# Patient Record
Sex: FEMALE | Race: WHITE | HISPANIC OR LATINO | Employment: FULL TIME | URBAN - METROPOLITAN AREA
[De-identification: names, ages, dates, MRNs, and addresses within clinical notes are randomized per-mention and may not be internally consistent; named-entity substitution may affect disease eponyms.]

---

## 2017-01-18 ENCOUNTER — OFFICE VISIT (OUTPATIENT)
Dept: FAMILY MEDICINE | Facility: HOSPITAL | Age: 42
End: 2017-01-18
Attending: FAMILY MEDICINE
Payer: MEDICAID

## 2017-01-18 VITALS
DIASTOLIC BLOOD PRESSURE: 89 MMHG | BODY MASS INDEX: 36.53 KG/M2 | HEART RATE: 103 BPM | SYSTOLIC BLOOD PRESSURE: 132 MMHG | WEIGHT: 222.88 LBS | RESPIRATION RATE: 20 BRPM

## 2017-01-18 DIAGNOSIS — M25.532 LEFT WRIST PAIN: Primary | ICD-10-CM

## 2017-01-18 DIAGNOSIS — J30.2 SEASONAL ALLERGIC RHINITIS, UNSPECIFIED ALLERGIC RHINITIS TRIGGER: ICD-10-CM

## 2017-01-18 PROCEDURE — 99213 OFFICE O/P EST LOW 20 MIN: CPT | Performed by: FAMILY MEDICINE

## 2017-01-18 RX ORDER — TRIAMCINOLONE ACETONIDE 55 UG/1
2 SPRAY, METERED NASAL DAILY
Qty: 16.5 G | Refills: 0 | Status: SHIPPED | OUTPATIENT
Start: 2017-01-18 | End: 2017-03-06 | Stop reason: SDUPTHER

## 2017-01-18 NOTE — PROGRESS NOTES
"Subjective:       Patient ID: Caridad Hyatt is a 41 y.o. female     Chief Complaint: Wrist Pain and Cough    HPI   with a history seasonal allergic rhinitis presenting for repeat BP check and L wrist pain. Pt states that she fell down stairs 1 year ago and caught self with wrist. Complains of 10/10 shooting R wrist pain daily with specific movements, 0/10 at rest. Pain radiates up arm, no relieving factors. States that she carries 18-month-old with left arm and wrist.    Also complains of rhinorrhea, headache, conjunctival injection, sinus pressure, and "itching in ears". Denies SOB, chest pain, cough, fever, chills. Minimal relief with Flonase, Claritin, and Nasonex.    Review of Systems   Constitutional: Negative for diaphoresis and fatigue.   HENT: Positive for congestion, rhinorrhea and sinus pressure. Negative for ear discharge and sneezing.    Eyes: Positive for redness. Negative for discharge and visual disturbance.   Cardiovascular: Negative for palpitations and leg swelling.   Gastrointestinal: Negative for abdominal distention, abdominal pain, diarrhea, nausea and vomiting.   Genitourinary: Negative for dysuria.   Musculoskeletal: Positive for arthralgias.        Left wrist pain   Skin: Negative for color change.   Neurological: Negative for weakness.   Psychiatric/Behavioral: Negative for agitation.         Objective:      Vitals:    01/18/17 1620   BP: 132/89   Pulse: 103   Resp: 20     Physical Exam   Constitutional: She is oriented to person, place, and time. She appears well-developed and well-nourished.   Erythematous nasal turbinates  Injected sclera   HENT:   Head: Normocephalic and atraumatic.   Eyes: EOM are normal. Pupils are equal, round, and reactive to light.   Neck: Normal range of motion.   Cardiovascular: Normal rate, regular rhythm and normal heart sounds.    Pulmonary/Chest: Effort normal and breath sounds normal. No respiratory distress. She has no wheezes. She has no rales. "   Abdominal: Soft.   Musculoskeletal: Normal range of motion. She exhibits deformity. She exhibits no tenderness.   L wrist radial swelling   Neurological: She is alert and oriented to person, place, and time.   Psychiatric: She has a normal mood and affect. Her behavior is normal. Thought content normal.       Assessment:       1. Left wrist pain    2. Seasonal allergic rhinitis, unspecified allergic rhinitis trigger        Plan:       Left wrist pain  - Wrist brace, OTC NSAIDs, ice/heating pad  -Xray of left wrist     Seasonal allergic rhinitis, unspecified allergic rhinitis trigger  -Nasacort, Zyrtec    Patient discussed with staff-Dr. Orion Auguste MD  [facility],  2  1/18/2017  5:36 PM

## 2017-01-19 ENCOUNTER — HOSPITAL ENCOUNTER (OUTPATIENT)
Dept: RADIOLOGY | Facility: HOSPITAL | Age: 42
Discharge: HOME | End: 2017-01-19
Attending: FAMILY MEDICINE
Payer: MEDICAID

## 2017-01-19 DIAGNOSIS — M25.532 LEFT WRIST PAIN: ICD-10-CM

## 2017-01-19 PROCEDURE — 73100 X-RAY EXAM OF WRIST: CPT | Mod: TC,LT

## 2017-01-19 PROCEDURE — 73100 X-RAY EXAM OF WRIST: CPT | Mod: 26,LT,, | Performed by: RADIOLOGY

## 2017-01-19 NOTE — PROGRESS NOTES
I assume primary medical responsibility for this patient, I have reviewed the case history, findings, diagnosis and treatment plan with the resident and agree that the care is reasonable and necessary. This service has been performed by a resident without the presence of a teaching physician under the primary care exception  Serene Sears  1/19/2017

## 2017-01-31 ENCOUNTER — TELEPHONE (OUTPATIENT)
Dept: FAMILY MEDICINE | Facility: HOSPITAL | Age: 42
End: 2017-01-31

## 2017-01-31 NOTE — TELEPHONE ENCOUNTER
----- Message from Sharon Yang sent at 1/20/2017  4:10 PM CST -----  Pt she requesting her x rays result  please call pt asap

## 2017-02-15 DIAGNOSIS — K21.9 GASTROESOPHAGEAL REFLUX DISEASE WITHOUT ESOPHAGITIS: ICD-10-CM

## 2017-02-15 RX ORDER — FAMOTIDINE 20 MG/1
TABLET, FILM COATED ORAL
Qty: 60 TABLET | Refills: 0 | Status: SHIPPED | OUTPATIENT
Start: 2017-02-15 | End: 2018-02-28

## 2017-03-06 DIAGNOSIS — J30.2 SEASONAL ALLERGIC RHINITIS, UNSPECIFIED ALLERGIC RHINITIS TRIGGER: ICD-10-CM

## 2017-03-06 RX ORDER — TRIAMCINOLONE ACETONIDE 55 UG/1
SPRAY, METERED NASAL
Qty: 16.9 G | Refills: 0 | Status: SHIPPED | OUTPATIENT
Start: 2017-03-06 | End: 2017-11-02 | Stop reason: SDUPTHER

## 2017-04-19 ENCOUNTER — TELEPHONE (OUTPATIENT)
Dept: OBSTETRICS AND GYNECOLOGY | Facility: CLINIC | Age: 42
End: 2017-04-19

## 2017-04-19 NOTE — TELEPHONE ENCOUNTER
----- Message from Gary Naik sent at 4/19/2017  1:16 PM CDT -----  Contact: self/[phone]  Patient would like to speak to you in regard to a procedure.  Please advise.

## 2017-04-19 NOTE — TELEPHONE ENCOUNTER
Patient called wanting to consult about ablation , hysterectomy and tubal ligation. Not all together but she wants to know which is best for her. She was advised to schedule and appt to have a consult visit. She is scheduled

## 2017-05-03 ENCOUNTER — TELEPHONE (OUTPATIENT)
Dept: OBSTETRICS AND GYNECOLOGY | Facility: CLINIC | Age: 42
End: 2017-05-03

## 2017-05-03 NOTE — TELEPHONE ENCOUNTER
----- Message from Gary Naik sent at 5/3/2017 10:06 AM CDT -----  Contact: self/[phone]  Patient needs MAMMO orders put in the system.  Please advise.

## 2017-05-09 ENCOUNTER — OFFICE VISIT (OUTPATIENT)
Dept: OBSTETRICS AND GYNECOLOGY | Facility: CLINIC | Age: 42
End: 2017-05-09
Payer: MEDICAID

## 2017-05-09 VITALS
WEIGHT: 223.13 LBS | HEIGHT: 66 IN | SYSTOLIC BLOOD PRESSURE: 110 MMHG | BODY MASS INDEX: 35.86 KG/M2 | DIASTOLIC BLOOD PRESSURE: 80 MMHG

## 2017-05-09 DIAGNOSIS — Z30.09 STERILIZATION CONSULT: Primary | ICD-10-CM

## 2017-05-09 PROCEDURE — 99212 OFFICE O/P EST SF 10 MIN: CPT | Mod: PBBFAC,PO | Performed by: OBSTETRICS & GYNECOLOGY

## 2017-05-09 PROCEDURE — 99999 PR PBB SHADOW E&M-EST. PATIENT-LVL II: CPT | Mod: PBBFAC,,, | Performed by: OBSTETRICS & GYNECOLOGY

## 2017-05-09 PROCEDURE — 99212 OFFICE O/P EST SF 10 MIN: CPT | Mod: S$PBB,,, | Performed by: OBSTETRICS & GYNECOLOGY

## 2017-05-09 RX ORDER — TRETINOIN 0.5 MG/G
CREAM TOPICAL NIGHTLY PRN
Refills: 3 | COMMUNITY
Start: 2017-04-20 | End: 2021-08-24

## 2017-05-09 RX ORDER — MONTELUKAST SODIUM 10 MG/1
10 TABLET ORAL DAILY
COMMUNITY
Start: 2017-05-04 | End: 2020-08-31

## 2017-05-09 RX ORDER — HYDROQUINONE 40 MG/G
CREAM TOPICAL
Refills: 3 | COMMUNITY
Start: 2017-02-22 | End: 2020-08-31

## 2017-05-09 RX ORDER — CLOTRIMAZOLE 1 %
CREAM (GRAM) TOPICAL 2 TIMES DAILY PRN
Refills: 3 | COMMUNITY
Start: 2017-04-20 | End: 2020-08-31

## 2017-05-09 RX ORDER — KETOCONAZOLE 20 MG/ML
SHAMPOO, SUSPENSION TOPICAL
Refills: 3 | COMMUNITY
Start: 2017-04-20 | End: 2018-10-10

## 2017-05-09 RX ORDER — BENZOYL PEROXIDE 5 G/100G
GEL TOPICAL
Refills: 2 | COMMUNITY
Start: 2017-04-21 | End: 2020-08-31

## 2017-05-09 NOTE — PROGRESS NOTES
GYNECOLOGY OFFICE NOTE    Reason for visit: desires sterilization    HPI: Pt is a 41 y.o.  female  who presents for consultation regarding sterilization. The use of hormonal contraception has been fully discussed with the patient. We discussed all options including OCPs, transdermal patches, vaginal ring, Depo Provera injections, Implanon, and IUD. Warnings about anticipated minor side effects such as breakthrough spotting, nausea, breast tenderness, weight changes, acne, headaches, etc were given.  She has been told of the more serious potential side effects such as MI, stroke, and deep vein thrombosis, all of which are very unlikely.  She has been asked to report any signs of such serious problems immediately. We also discussed sterilization with bilateral salpingectomy and vasectomy. Pt interested in bilateral salpingectomy.     Past Medical History:   Diagnosis Date    Abnormal Pap smear of cervix     rechecked and came back normal     Anemia     Endometriosis     Hypertension in pregnancy, preeclampsia, delivered        Past Surgical History:   Procedure Laterality Date    ENDOMETRIAL ABLATION      PELVIC LAPAROSCOPY      [facility] [place]- [place]       Family History   Problem Relation Age of Onset    Hypertension Father        Social History   Substance Use Topics    Smoking status: Former Smoker     Packs/day: 0.25     Types: Cigarettes     Start date: 1993     Quit date: 2015    Smokeless tobacco: Never Used    Alcohol use No       OB History    Para Term  AB SAB TAB Ectopic Multiple Living   4 2 1 1 2     2      # Outcome Date GA Lbr Thee/2nd Weight Sex Delivery Anes PTL Lv   4 Term 08/20/15 38w0d  3.062 kg (6 lb 12 oz) F INDUCTION EPI N Y   3  95 39w0d  2.863 kg (6 lb 5 oz) F INDUCTION None N Y      Complications: S/P gastrochisis repair, follow-up exam   2 AB            1 AB               Obstetric Comments   8-20-15 episiotomy  "performed        Current Outpatient Prescriptions   Medication Sig    benzoyl peroxide 5% 5 % gel CESAR TO THE AFFECTED AREA BID    cetirizine (ZYRTEC) 5 MG chewable tablet Take 1 tablet (5 mg total) by mouth once daily.    clotrimazole (LOTRIMIN) 1 % cream CESAR TO THE AFFECTED AREA  QD TO BID    famotidine (PEPCID) 20 MG tablet TAKE ONE TABLET BY MOUTH TWICE DAILY    hydroquinone 4 % Crea APPLY AA BID    ketoconazole (NIZORAL) 2 % shampoo CESAR UTD QD    ketotifen (ZADITOR) 0.025 % (0.035 %) ophthalmic solution INT 1 GTT OU BID    montelukast (SINGULAIR) 10 mg tablet     prednisoLONE acetate (PRED FORTE) 1 % DrpS SHAKE LQ AND INT 1 GTT IN OU BID    tretinoin (RETIN-A) 0.05 % cream APPLY HS    triamcinolone (NASACORT) 55 mcg nasal inhaler USE 2 SPRAYS IN EACH NOSTRIL DAILY     No current facility-administered medications for this visit.        Allergies: Aspirin     /80  Ht 5' 5.5" (1.664 m)  Wt 101.2 kg (223 lb 1.7 oz)  LMP 04/16/2017  BMI 36.56 kg/m2    ROS:  GENERAL: Denies fever or chills.   SKIN: Denies rash or lesions.   HEAD: Denies head injury or headache.   CHEST: Denies chest pain or shortness of breath.   CARDIOVASCULAR: Denies palpitations or chest pain.   ABDOMEN: No abdominal pain, constipation, diarrhea, nausea, vomiting or rectal bleeding.   URINARY: No dysuria, hematuria, or burning on urination.  REPRODUCTIVE: See HPI.   BREASTS: Denies pain, lumps, or nipple discharge.   NEUROLOGIC: Denies syncope or weakness.     Physical Exam:  GENERAL: alert, appears stated age and cooperative  CHEST: Normal respiratory effort  HEART: S1 and S2 normal, regular rate and rhythm  NECK: normal appearance, no thyromegaly masses or tenderness  SKIN: no acne, striae, hirsutism  Talk only    Diagnosis:  1. Sterilization consult        Plan:   1. Will send message to surgical coordinator for sterilization to discuss cost and coverage. Also given information on vasectomy as well. Pt states she may also be " interested in an endometrial ablation to help decreased her menstrual bleeding. Pt has appointment on 5/16/17 for annual and states she should know by then what she desires to have done. .       Adriane Patel MD  OB/GYN  Pager: [phone]

## 2017-06-26 ENCOUNTER — TELEPHONE (OUTPATIENT)
Dept: OBSTETRICS AND GYNECOLOGY | Facility: CLINIC | Age: 42
End: 2017-06-26

## 2017-06-26 NOTE — TELEPHONE ENCOUNTER
Patient informed on  May 9, 2017 during her appointment Dr Patel discussed the options.  Patient was going to inform Dr Patel which procedure she wanted at the time of her annual exam.  Appointment for annual exam scheduled

## 2017-06-26 NOTE — TELEPHONE ENCOUNTER
----- Message from Gary Naik sent at 6/26/2017 12:28 PM CDT -----  Contact: self/[phone]  Patient called to speak to nurse about scheduling the procedure discussed.    Please advise.

## 2017-07-26 ENCOUNTER — TELEPHONE (OUTPATIENT)
Dept: OBSTETRICS AND GYNECOLOGY | Facility: CLINIC | Age: 42
End: 2017-07-26

## 2017-08-11 ENCOUNTER — TELEPHONE (OUTPATIENT)
Dept: OBSTETRICS AND GYNECOLOGY | Facility: CLINIC | Age: 42
End: 2017-08-11

## 2017-08-11 NOTE — TELEPHONE ENCOUNTER
Contacted patients to remind her Dr. Patel will not be in the office today and to reschedule her appointment. Patient reschedule appointment for 8/29/17 at 10:15. Patient verbalized understanding.

## 2017-09-06 ENCOUNTER — OFFICE VISIT (OUTPATIENT)
Dept: OBSTETRICS AND GYNECOLOGY | Facility: CLINIC | Age: 42
End: 2017-09-06
Payer: MEDICAID

## 2017-09-06 VITALS
DIASTOLIC BLOOD PRESSURE: 82 MMHG | WEIGHT: 198.44 LBS | SYSTOLIC BLOOD PRESSURE: 118 MMHG | BODY MASS INDEX: 32.52 KG/M2

## 2017-09-06 DIAGNOSIS — Z01.419 WELL WOMAN EXAM WITH ROUTINE GYNECOLOGICAL EXAM: Primary | ICD-10-CM

## 2017-09-06 DIAGNOSIS — Z12.39 SCREENING FOR BREAST CANCER: ICD-10-CM

## 2017-09-06 DIAGNOSIS — N92.6 IRREGULAR BLEEDING: ICD-10-CM

## 2017-09-06 DIAGNOSIS — Z30.09 STERILIZATION CONSULT: ICD-10-CM

## 2017-09-06 LAB
B-HCG UR QL: NEGATIVE
CTP QC/QA: YES

## 2017-09-06 PROCEDURE — 81025 URINE PREGNANCY TEST: CPT | Mod: PBBFAC,PO | Performed by: OBSTETRICS & GYNECOLOGY

## 2017-09-06 PROCEDURE — 99396 PREV VISIT EST AGE 40-64: CPT | Mod: 57,S$PBB,, | Performed by: OBSTETRICS & GYNECOLOGY

## 2017-09-06 PROCEDURE — 88305 TISSUE EXAM BY PATHOLOGIST: CPT | Performed by: PATHOLOGY

## 2017-09-06 PROCEDURE — 88305 TISSUE EXAM BY PATHOLOGIST: CPT | Mod: 26,,, | Performed by: PATHOLOGY

## 2017-09-06 PROCEDURE — 99213 OFFICE O/P EST LOW 20 MIN: CPT | Mod: PBBFAC,PO,25 | Performed by: OBSTETRICS & GYNECOLOGY

## 2017-09-06 PROCEDURE — 3008F BODY MASS INDEX DOCD: CPT | Mod: ,,, | Performed by: OBSTETRICS & GYNECOLOGY

## 2017-09-06 PROCEDURE — 99999 PR PBB SHADOW E&M-EST. PATIENT-LVL III: CPT | Mod: PBBFAC,,, | Performed by: OBSTETRICS & GYNECOLOGY

## 2017-09-06 RX ORDER — MEDROXYPROGESTERONE ACETATE 10 MG/1
TABLET ORAL
Qty: 84 TABLET | Refills: 0 | Status: SHIPPED | OUTPATIENT
Start: 2017-09-06 | End: 2017-11-02 | Stop reason: SDUPTHER

## 2017-09-06 NOTE — PROGRESS NOTES
GYNECOLOGY OFFICE NOTE    Reason for visit: annual    HPI: Pt is a 42 y.o.  female  who presents for annual. Reports heavy cycles for first 2-3 days. Recently changing pad every 1hr saturated with blood clots. States last month had two cycles Cycle: menarche- 18, Interval- Q month, Duration- 7 days, Flow- normal, denies dysmenorrhea. She is sexually active.  She uses no method for contraception.  She does not desire STI screening. She denies vaginal discharge.  Last pap: 2016, denies hx of abnormal. Last MMG - negative. Pt interested in having bilateral salpingectomy now after discussing other forms of contraception. Understands that it is permanent. Secondary to issues with bleeding currently will like to proceed with endometrial ablation. We discussed the need for endometrial biopsy prior. Patient was counseled today on the causes of AUB/menorrhagia: fibroids, polyps, adenomyosis, anovulation resulting in endometrial hyperplasia, endometritis, cervicitis; the need for a proper evaluation and for a tissue diagnosis was discussed.  A hysteroscopy with D/C  may be necessary. The need for a bHCG, cervical cultures and TVUS was discussed.   The hormonal treatment options for menorrhagia include: Prometrium,  BCPs + NSAID, IUD-Mirena, the pros, cons, risks, and benefits of each was discussed. Other options include endometrial ablation, uterine artery embolization, and hysterectomy; each was discussed in detail.   During the consultation session all of her question were answered. Patient elects to try endometrial ablation.      Past Medical History:   Diagnosis Date    Abnormal Pap smear of cervix     rechecked and came back normal     Anemia     Endometriosis     Hypertension in pregnancy, preeclampsia, delivered        Past Surgical History:   Procedure Laterality Date    [facility] [place]       Family History   Problem Relation Age of Onset     Hypertension Father        Social History   Substance Use Topics    Smoking status: Former Smoker     Packs/day: 0.25     Types: Cigarettes     Start date: 1993     Quit date: 2015    Smokeless tobacco: Never Used    Alcohol use No       OB History    Para Term  AB Living   4 2 1 1 2 2   SAB TAB Ectopic Multiple Live Births           2      # Outcome Date GA Lbr Thee/2nd Weight Sex Delivery Anes PTL Lv   4 Term 08/20/15 38w0d  3.062 kg (6 lb 12 oz) F INDUCTION EPI N RITO   3  95 39w0d  2.863 kg (6 lb 5 oz) F INDUCTION None N RITO      Complications: S/P gastrochisis repair, follow-up exam   2 AB            1 AB               Obstetric Comments   8-20-15 episiotomy performed        Current Outpatient Prescriptions   Medication Sig    benzoyl peroxide 5% 5 % gel CESAR TO THE AFFECTED AREA BID    cetirizine (ZYRTEC) 5 MG chewable tablet Take 1 tablet (5 mg total) by mouth once daily.    clotrimazole (LOTRIMIN) 1 % cream CESAR TO THE AFFECTED AREA  QD TO BID    famotidine (PEPCID) 20 MG tablet TAKE ONE TABLET BY MOUTH TWICE DAILY    hydroquinone 4 % Crea APPLY AA BID    ketoconazole (NIZORAL) 2 % shampoo CESAR UTD QD    ketotifen (ZADITOR) 0.025 % (0.035 %) ophthalmic solution INT 1 GTT OU BID    montelukast (SINGULAIR) 10 mg tablet     prednisoLONE acetate (PRED FORTE) 1 % DrpS SHAKE LQ AND INT 1 GTT IN OU BID    tretinoin (RETIN-A) 0.05 % cream APPLY HS    triamcinolone (NASACORT) 55 mcg nasal inhaler USE 2 SPRAYS IN EACH NOSTRIL DAILY    medroxyPROGESTERone (PROVERA) 10 MG tablet 2 tablets TID x 7 days then 2 tablets daily x 21 days     No current facility-administered medications for this visit.        Allergies: Aspirin     /82   Wt 90 kg (198 lb 6.6 oz)   LMP 2017 (Exact Date)   BMI 32.52 kg/m²     ROS:  GENERAL: Denies fever or chills.   SKIN: Denies rash or lesions.   HEAD: Denies head injury or headache.   CHEST: Denies chest pain or shortness of breath.    CARDIOVASCULAR: Denies palpitations or chest pain.   ABDOMEN: No abdominal pain, constipation, diarrhea, nausea, vomiting or rectal bleeding.   URINARY: No dysuria, hematuria, or burning on urination.  REPRODUCTIVE: See HPI.   BREASTS: Denies pain, lumps, or nipple discharge.   NEUROLOGIC: Denies syncope or weakness.     Physical Exam:  GENERAL: alert, appears stated age and cooperative  CHEST: Normal respiratory effort  HEART: S1 and S2 normal, regular rate and rhythm  NECK: normal appearance, no thyromegaly masses or tenderness  SKIN: no acne, striae, hirsutism  BREAST EXAM: breasts appear normal, no suspicious masses, no skin or nipple changes or axillary nodes  ABDOMEN: abdomen is soft without significant tenderness, masses, organomegaly or guarding, no hernias noted  EXTERNAL GENITALIA:  normal general appearance  URETHRA: normal urethra, normal urethral meatus  VAGINA:  normal mucosa without prolapse or lesions  CERVIX:  Nabothian cysts  UTERUS:  normal size, mobile, non-tender, normal shape and consistency  ADNEXA:  normal adnexa in size, nontender and no masses    DATE: September 6th, 2017    TIME: 10:30 AM    PROCEDURE: Endometrial biopsy    INDICATION: AUB-N    PATIENT CONSENT:     PRE ENDOMETRIAL BIOPSY COUNSELING:The patient was informed of the risk of bleeding, infection, uterine perforation and pain and that the test will rule-out endometrial cancer with accuracy greater than 95%. She was counseled on the alternatives to endometrial biopsy.  All of her questions were answered. Counseling lasted approximately 15 minutes and all her questions were answered.    Patient gives written consent    ANESTHESIA: None    Labs: UPT performed prior to the procedure was negative.    PROCEDURE NOTE:  The cervix was visualized with a speculum and swabbed with Betadinex3.  The anterior lip of the cervix was grasped with a single toothed tenaculum, and a sterile endometrial pipelle was inserted into the uterus to a  sound length of 8 cm. 2 passes were made with the pipelle and scant amount of tissue was obtained. The specimen was placed in formalin and sent to Pathology for evaluation. Tenaculum removed and hemostasis noted    COMPLICATIONS: None    DISPOSITION: The patient tolerated the above procedure well.    POST ENDOMETRIAL BIOPSY COUNSELING:  - Manage post biopsy cramping with NSAIDs or Tylenol.  - Expect spotting or light bleeding for a few days.  - Report bleeding heavier than a period, fever > 101.0 F, worsening pain or a foul smelling vaginal discharge.      Diagnosis:  1. Well woman exam with routine gynecological exam    2. Irregular bleeding    3. Screening for breast cancer    4. Sterilization consult        Plan:   1. Annual. Up todate with pap  2. EMBX today. Pt desires to have endometrial ablation  3. MMG order  4. Medicaid consents signed for bilateral salpingectomy. Message sent to Steph Merrill Placed This Encounter    Mammo Digital Screening Bilat with CAD    POCT urine pregnancy    Tissue Specimen To Pathology, Obstetrics/Gynecology    medroxyPROGESTERone (PROVERA) 10 MG tablet       Patient was counseled today on the new ACS guidelines for cervical cytology screening as well as the current recommendations for breast cancer screening. She was counseled to follow up with her PCP for other routine health maintenance.     Return for preoperative appointment.      Adriane Patel MD  OB/GYN  Pager: [phone]

## 2017-09-08 ENCOUNTER — TELEPHONE (OUTPATIENT)
Dept: OBSTETRICS AND GYNECOLOGY | Facility: CLINIC | Age: 42
End: 2017-09-08

## 2017-09-08 NOTE — TELEPHONE ENCOUNTER
Patient called and stated that she need a prior authorization ,for the provera Rx . Called [employer] and initiated PA. Rep stated that she will fax over decision within 24 hours.   PA#[id number]

## 2017-09-11 ENCOUNTER — TELEPHONE (OUTPATIENT)
Dept: OBSTETRICS AND GYNECOLOGY | Facility: CLINIC | Age: 42
End: 2017-09-11

## 2017-09-11 NOTE — TELEPHONE ENCOUNTER
Returend patients call. Patient did not answer. Calling patient to notify her of medication being approved. Left message to return call.

## 2017-09-11 NOTE — TELEPHONE ENCOUNTER
----- Message from Travis Zarate sent at 9/11/2017  9:46 AM CDT -----  Contact: [phone]/self  Pt states she's returning your call.  Please call and advise

## 2017-09-11 NOTE — TELEPHONE ENCOUNTER
----- Message from Ayde Sanchez sent at 9/11/2017 10:28 AM CDT -----  Contact: [phone]/self  Patient called in returning your call. Please advise.

## 2017-09-19 ENCOUNTER — TELEPHONE (OUTPATIENT)
Dept: OBSTETRICS AND GYNECOLOGY | Facility: CLINIC | Age: 42
End: 2017-09-19

## 2017-09-19 NOTE — TELEPHONE ENCOUNTER
----- Message from Rose Hernández sent at 9/19/2017 11:35 AM CDT -----  Contact: [phone]  Patient is returning your call

## 2017-09-19 NOTE — TELEPHONE ENCOUNTER
Returned patients call. Patient wants to know what her next steps are to get her tubal scheduled. Patient would like to schedule her tubal. Patient consents signed on 9/6/17. Patient informed she will receive a call to schedule her surgery. Patient verbalized understanding.

## 2017-09-19 NOTE — TELEPHONE ENCOUNTER
----- Message from Rose Hernández sent at 9/19/2017 11:35 AM CDT -----  Contact: [phone]  Patient is returning your call

## 2017-09-22 ENCOUNTER — TELEPHONE (OUTPATIENT)
Dept: OBSTETRICS AND GYNECOLOGY | Facility: CLINIC | Age: 42
End: 2017-09-22

## 2017-09-22 NOTE — TELEPHONE ENCOUNTER
Returned patients phone call. Informed patient that it was not documented that someone called. She informed me she is waiting on a call to schedule her procedure for a tubal so maybe that could have been what it was for. Informed patient I would route to my supervisor to see if maybe that is why she received a call. Patient verbalized understanding.

## 2017-09-22 NOTE — TELEPHONE ENCOUNTER
----- Message from Edna Saucedo sent at 9/22/2017 10:57 AM CDT -----  Contact: self, [phone]  Patient called in returning your call. Please advise.

## 2017-10-11 ENCOUNTER — TELEPHONE (OUTPATIENT)
Dept: OBSTETRICS AND GYNECOLOGY | Facility: CLINIC | Age: 42
End: 2017-10-11

## 2017-10-11 NOTE — TELEPHONE ENCOUNTER
----- Message from Zander Muñoz sent at 10/11/2017  8:30 AM CDT -----  Contact: self/[phone]  She is returning the nurse's call.

## 2017-10-11 NOTE — TELEPHONE ENCOUNTER
----- Message from Zander Muñoz sent at 10/11/2017  8:30 AM CDT -----  Contact: self/[phone]  She is returning the nurse's call.

## 2017-10-12 ENCOUNTER — HOSPITAL ENCOUNTER (OUTPATIENT)
Dept: RADIOLOGY | Facility: HOSPITAL | Age: 42
Discharge: HOME | End: 2017-10-12
Attending: OBSTETRICS & GYNECOLOGY
Payer: MEDICAID

## 2017-10-12 ENCOUNTER — TELEPHONE (OUTPATIENT)
Dept: OBSTETRICS AND GYNECOLOGY | Facility: CLINIC | Age: 42
End: 2017-10-12

## 2017-10-12 VITALS — BODY MASS INDEX: 31.82 KG/M2 | WEIGHT: 198 LBS | HEIGHT: 66 IN

## 2017-10-12 DIAGNOSIS — N80.9 ENDOMETRIOSIS: Primary | ICD-10-CM

## 2017-10-12 DIAGNOSIS — Z12.39 SCREENING FOR BREAST CANCER: ICD-10-CM

## 2017-10-12 DIAGNOSIS — N92.1 MENORRHAGIA WITH IRREGULAR CYCLE: ICD-10-CM

## 2017-10-12 PROCEDURE — 77067 SCR MAMMO BI INCL CAD: CPT | Mod: 26,,, | Performed by: RADIOLOGY

## 2017-10-12 PROCEDURE — 77067 SCR MAMMO BI INCL CAD: CPT | Mod: TC

## 2017-10-12 NOTE — TELEPHONE ENCOUNTER
Returned patients call. Patient states she got a call to have her procedure done next week to have a tubal and to burn the lining of her uterus. Patient states she does not get very good service in her house so she is worried she missed a call. Please advise if patient has missed a call to schedule the surgery. She would like it the week of the 23rd if possible.

## 2017-10-12 NOTE — TELEPHONE ENCOUNTER
----- Message from Nae Bartholomew sent at 10/12/2017 10:05 AM CDT -----  Contact: Self/[phone]  Patient would like to speak with you about getting information about a procedure. Please advise.

## 2017-10-30 ENCOUNTER — TELEPHONE (OUTPATIENT)
Dept: OBSTETRICS AND GYNECOLOGY | Facility: CLINIC | Age: 42
End: 2017-10-30

## 2017-10-31 ENCOUNTER — HOSPITAL ENCOUNTER (OUTPATIENT)
Dept: PREADMISSION TESTING | Facility: HOSPITAL | Age: 42
Discharge: HOME | End: 2017-10-31
Attending: INTERNAL MEDICINE
Payer: MEDICAID

## 2017-10-31 ENCOUNTER — OFFICE VISIT (OUTPATIENT)
Dept: OBSTETRICS AND GYNECOLOGY | Facility: CLINIC | Age: 42
End: 2017-10-31
Payer: MEDICAID

## 2017-10-31 ENCOUNTER — ANESTHESIA EVENT (OUTPATIENT)
Dept: SURGERY | Facility: HOSPITAL | Age: 42
End: 2017-10-31
Payer: MEDICAID

## 2017-10-31 VITALS
BODY MASS INDEX: 33.09 KG/M2 | SYSTOLIC BLOOD PRESSURE: 130 MMHG | DIASTOLIC BLOOD PRESSURE: 78 MMHG | WEIGHT: 201.94 LBS

## 2017-10-31 VITALS
SYSTOLIC BLOOD PRESSURE: 139 MMHG | OXYGEN SATURATION: 95 % | TEMPERATURE: 98 F | DIASTOLIC BLOOD PRESSURE: 93 MMHG | HEART RATE: 91 BPM | BODY MASS INDEX: 33.65 KG/M2 | WEIGHT: 201.94 LBS | RESPIRATION RATE: 17 BRPM | HEIGHT: 65 IN

## 2017-10-31 DIAGNOSIS — Z01.818 PRE-OP TESTING: ICD-10-CM

## 2017-10-31 DIAGNOSIS — Z30.2 ENCOUNTER FOR STERILIZATION: ICD-10-CM

## 2017-10-31 DIAGNOSIS — N93.9 ABNORMAL UTERINE BLEEDING (AUB): Primary | ICD-10-CM

## 2017-10-31 PROCEDURE — 99213 OFFICE O/P EST LOW 20 MIN: CPT | Mod: S$PBB,,, | Performed by: OBSTETRICS & GYNECOLOGY

## 2017-10-31 PROCEDURE — 99213 OFFICE O/P EST LOW 20 MIN: CPT | Mod: PBBFAC,PO | Performed by: OBSTETRICS & GYNECOLOGY

## 2017-10-31 PROCEDURE — 99999 PR PBB SHADOW E&M-EST. PATIENT-LVL III: CPT | Mod: PBBFAC,,, | Performed by: OBSTETRICS & GYNECOLOGY

## 2017-10-31 RX ORDER — IBUPROFEN 600 MG/1
600 TABLET ORAL EVERY 6 HOURS PRN
Qty: 30 TABLET | Refills: 0 | Status: SHIPPED | OUTPATIENT
Start: 2017-10-31 | End: 2017-10-31 | Stop reason: CLARIF

## 2017-10-31 RX ORDER — SODIUM CHLORIDE, SODIUM LACTATE, POTASSIUM CHLORIDE, CALCIUM CHLORIDE 600; 310; 30; 20 MG/100ML; MG/100ML; MG/100ML; MG/100ML
INJECTION, SOLUTION INTRAVENOUS CONTINUOUS
Status: CANCELLED | OUTPATIENT
Start: 2017-10-31

## 2017-10-31 RX ORDER — SCOLOPAMINE TRANSDERMAL SYSTEM 1 MG/1
1 PATCH, EXTENDED RELEASE TRANSDERMAL
Status: CANCELLED | OUTPATIENT
Start: 2017-10-31 | End: 2017-11-01

## 2017-10-31 RX ORDER — LIDOCAINE HYDROCHLORIDE 10 MG/ML
1 INJECTION, SOLUTION EPIDURAL; INFILTRATION; INTRACAUDAL; PERINEURAL ONCE
Status: CANCELLED | OUTPATIENT
Start: 2017-10-31 | End: 2017-10-31

## 2017-10-31 RX ORDER — OXYCODONE AND ACETAMINOPHEN 5; 325 MG/1; MG/1
1 TABLET ORAL EVERY 6 HOURS PRN
Qty: 20 TABLET | Refills: 0 | Status: SHIPPED | OUTPATIENT
Start: 2017-10-31 | End: 2018-02-26

## 2017-10-31 NOTE — DISCHARGE INSTRUCTIONS
Your surgery is scheduled for 11/03/17.    Please report to Outpatient Surgery Intake Office on the 2nd FLOOR at 0530 a.m.          INSTRUCTIONS IMPORTANT!!!  ¨ Do not eat or drink after 11pm -including water. OK to brush teeth, no   gum, candy or mints!    ¨ Take only these medicines with a small swallow of water-morning of surgery.        ____  Proceed to [facility] on 10/31/2017 for additional blood test.        ____  Do not wear makeup, including mascara.  ____  No powder, lotions or creams to surgical area.  ____  Please remove all jewelry, including piercings and leave at home.  ____  No money or valuables needed. Please leave at home.  ____  Please bring any documents given by your doctor.  ____  If going home the same day, arrange for a ride home. You will not be able to             drive if Anesthesia was used.  ____  Children under 18 years require a parent / guardian present the entire time             they are in surgery / recovery.  ____  Wear loose fitting clothing. Allow for dressings, bandages.  ____  Stop Aspirin, Ibuprofen, Motrin and Aleve at least 3-5 days before surgery, unless otherwise instructed by your doctor, or the nurse.   You MAY use Tylenol/acetaminophen until day of surgery.  ____  Wash the surgical area with Hibiclens the night before surgery, and again the             morning of surgery.  Be sure to rinse hibiclens off completely (if instructed by   nurse).  ____  If you take diabetic medication, do not take am of surgery unless instructed by Doctor.  ____  Call MD for temperature above 101 degrees.  ____ Stop taking any Fish Oil supplement or any Vitamins that contain Vitamin E at least 5 days prior to surgery.  ____ Do Not wear your contact lenses the day of your procedure.  You may wear your glasses.        I have read or had read and explained to me, and understand the above information.  Additional comments or instructions:  For additional questions call  [phone]     Take a Hibiclens shower twice a day for 3 days prior to surgery, including the morning of surgery.   Gargle with Listerine twice a day for 3 days prior to surgery, including the morning of surgery.      Pre-Op Bathing Instructions    Before surgery, you can play an important role in your own health.    Because skin is not sterile, we need to be sure that your skin is as free of germs as possible. By following the instructions below, you can reduce the number of germs on your skin before surgery.    IMPORTANT: You will need to shower with a special soap called Hibiclens*, available at any pharmacy.  If you are allergic to Chlorhexidine (the antiseptic in Hibiclens), use an antibacterial soap such as Dial Soap for your preoperative shower.  You will shower with Hibiclens both the night before your surgery and the morning of your surgery.  Do not use Hibiclens on the head, face or genitals to avoid injury to those areas.    STEP #1: THE NIGHT BEFORE YOUR SURGERY     1. Do not shave the area of your body where your surgery will be performed.  2. Shower and wash your hair and body as usual with your normal soap and shampoo.  3. Rinse your hair and body thoroughly after you shower to remove all soap residue.  4. With your hand, apply one packet of Hibiclens soap to the surgical site.   5. Wash the site gently for five (5) minutes. Do not scrub your skin too hard.   6. Do not wash with your regular soap after Hibiclens is used.  7. Rinse your body thoroughly.  8. Pat yourself dry with a clean, soft towel.  9. Do not use lotion, cream, or powder.  10. Wear clean clothes.    STEP #2: THE MORNING OF YOUR SURGERY     1. Repeat Step #1.    * Not to be used by people allergic to Chlorhexidine.    Discharge Instruction for Laparoscopic Fallopian Tube Ligation  Your doctor did a sterilization procedure to prevent any future pregnancies. This is a permanent form of birth control. Several different methods of surgical  sterilization can be used to block the fallopian tubes. They all stop the egg from entering the womb (uterus) and sperm from traveling up to fertilize the egg. You had a laparoscopic procedure. The incisions on your abdomen may be tender or sore. You may also have pain in your upper back or shoulders. This is from the gas used to enlarge the abdomen to allow your doctor to see inside your pelvis and do the procedure. This pain usually goes away in a day or two.  Here's what you can do to speed your recovery after surgery.   Home care  · Take it easy. Stay quiet and rest for 2 days.  · Return to your normal activities after 48 hours. You may also return to work at that time.  · Eat a normal diet.  · If needed, take an over-the-counter pain reliever for pain.  · Don't lift anything heavier than 10 pounds for 1 week after the procedure.  · Don't drive for at least 24 hours after the procedure and until pain is minimal without taking opioids if prescribed  · Don't have sex for 2 weeks after surgery.  Follow-up care  Make a follow-up appointment as directed by our staff.     When to call your healthcare provider  Call your healthcare provider right away if you have any of the following:  · Fever above 100.4°F (38°C) or higher, or as directed by your healthcare provider  · Chills  · Dizziness or fainting  · Abdominal pain and swelling that get worse  · Nausea and vomiting  · Signs of infection. These include drainage, pus, warmth, or redness at your incision site.  · Sudden chest pain or shortness of breath   Date Last Reviewed: 5/19/2015  © 7920-0074 [employer]. [address] [place] [address], [place] [address]. All rights reserved. This information is not intended as a substitute for professional medical care. Always follow your healthcare professional's instructions.    Endometrial Ablation  Endometrial ablation is an outpatient surgery that can reduce or stop heavy menstrual bleeding. Ablation destroys the  lining of the uterus. This surgery is for women who do not want to have any more children and who have not yet entered menopause. It should not be used by women with endometrial hyperplasia or cancer of the uterus.  Treatment takes less than an hour, and you can go home later that day.  Preparing for surgery  · You may be given medicine by mouth or injection for a few weeks or months before your ablation. This thins the lining of the uterus and reduces bleeding.  · Your health care provider may recommend other procedures to check the inside of your uterus before the ablation is done.   · The day before surgery, you may be given medicine or a special substance (laminaria) may be put into your cervix (the opening to the uterus). This widens the opening.  · To help prevent problems with anesthesia, do not eat or drink anything 10 hours before surgery.  Your surgery     Destroying the lining with heat, freezing, or electric current prevents the lining from growing back.      · Youll be given anesthesia so you stay comfortable and relaxed and feel no pain during surgery.  · Then, your uterus may be filled with fluid. This puts pressure on the lining to help reduce bleeding. It also allows your health care provider to see inside your uterus.  · Next your health care provider puts a small telescope-like instrument through the cervix. This scope may be connected to a video monitor. This helps your health care provider see and control the ablation process. At the end of the scope, a device using heat, freezing, or electric current destroys the uterine lining. Instead of the scope, your health care provider may use a device that both expands and destroys the uterine lining. After being inserted into your uterus, it also uses heat or other energy to destroy the lining. Your health care provider will choose the device thats best for you.  Your recovery  · You may have cramping or aching in your abdomen after surgery. Your  health care provider can give you pain medicine.  · You may also have a bloody or watery discharge or bleeding for days or weeks. Use sanitary pads, not tampons.  · Dont have sexual intercourse or play active sports for 2 weeks after surgery.  · You can likely return to work in 2 days.  · Ask your health care provider about using contraception after an ablation.  · Your health care provider will see you in about 6 weeks to be sure youre healing well.  Call your health care provider if you have any of the following after surgery:  · Persistent or increased abdominal pain  · Shortness of breath  · Heavy vaginal bleeding  · Fever over 100.4°F (38°C) or chills  · Nausea  · Frequent urination for 24 hours   Date Last Reviewed: 5/10/2015  © 6732-8577 [employer]. [address] [place] [address]. All rights reserved. This information is not intended as a substitute for professional medical care. Always follow your healthcare professional's instructions.    Anesthesia: General Anesthesia     You are watched continuously during your procedure by your anesthesia provider.     Youre due to have surgery. During surgery, youll be given medicine called anesthesia or anesthetic. This will keep you comfortable and pain-free. Your anesthesia provider will use general anesthesia.  What is general anesthesia?  General anesthesia puts you into a state like deep sleep. It goes into the bloodstream (IV anesthetics), into the lungs (gas anesthetics), or both. You feel nothing during the procedure. You will not remember it. During the procedure, the anesthesia provider monitors you continuously. He or she checks your heart rate and rhythm, blood pressure, breathing, and blood oxygen.  · IV anesthetics. IV anesthetics are given through an IV line in your arm. Theyre often given first. This is so you are asleep before a gas anesthetic is started. Some kinds of IV anesthetics relieve pain. Others relax you. Your  doctor will decide which kind is best in your case.  · Gas anesthetics. Gas anesthetics are breathed into the lungs. They are often used to keep you asleep. They can be given through a facemask or a tube placed in your larynx or trachea (breathing tube).  ¨ If you have a facemask, your anesthesia provider will most likely place it over your nose and mouth while youre still awake. Youll breathe oxygen through the mask as your IV anesthetic is started. Gas anesthetic may be added through the mask.  ¨ If you have a tube in the larynx or trachea, it will be inserted into your throat after youre asleep.  Anesthesia tools and medicines  You will likely have:  · IV anesthetics. These are put into an IV line into your bloodstream.  · Gas anesthetics. You breathe these anesthetics into your lungs, where they pass into your bloodstream.  · Pulse oximeter. This is a small clip that is attached to the end of your finger. This measures your blood oxygen level.  · Electrocardiography leads (electrodes). These are small sticky pads that are placed on your chest. They record your heart rate and rhythm.  · Blood pressure cuff. This reads your blood pressure.  Risks and possible complications  General anesthesia has some risks. These include:  · Breathing problems  · Nausea and vomiting  · Sore throat or hoarseness (usually temporary)  · Allergic reaction to the anesthetic  · Irregular heartbeat (rare)  · Cardiac arrest (rare)   Anesthesia safety  · Follow all instructions you are given for how long not to eat or drink before your procedure.  · Be sure your doctor knows what medicines and drugs you take. This includes over-the-counter medicines, herbs, supplements, alcohol or other drugs. You will be asked when those were last taken.  · Have an adult family member or friend drive you home after the procedure.  · For the first 24 hours after your surgery:  ¨ Do not drive or use heavy equipment.  ¨ Do not make important decisions or  sign legal documents. If important decisions or signing legal documents is necessary during the first 24 hours after surgery, have a trusted family member or spouse act on your behalf.  ¨ Avoid alcohol.  ¨ Have a responsible adult stay with you. He or she can watch for problems and help keep you safe.  Date Last Reviewed: 12/1/2016  © 2708-6364 [employer]. [address] [place] [address], [place] [address]. All rights reserved. This information is not intended as a substitute for professional medical care. Always follow your healthcare professional's instructions.

## 2017-10-31 NOTE — PLAN OF CARE
Pre op done, Patient unable to go to get labs done today will come and get them done tomorrow.  She has to  her kids

## 2017-10-31 NOTE — PROGRESS NOTES
[facility] Pre-op Exam      HPI : Caridad De La Garza is a 42 y.o. female  for preop appointment for BTL/ablation secondary to desires sterilization and AUB-N. Surgery scheduled for 11/3/17. Patient has tried OCP/depo lupron for endometrosis, AUB:  Patient was counseled today on the causes of AUB/menorrhagia: fibroids, polyps, adenomyosis, anovulation resulting in endometrial hyperplasia, endometritis, cervicitis; the need for a proper evaluation and for a tissue diagnosis was discussed.  A hysteroscopy with D/C  may be necessary. The need for a bHCG, cervical cultures and TVUS was discussed.   The hormonal treatment options for menorrhagia include: Prometrium,  BCPs + NSAID, IUD-Mirena, the pros, cons, risks, and benefits of each was discussed. Other options include endometrial ablation, uterine artery embolization, and hysterectomy; each was discussed in detail.   During the consultation session all of her question were answered. Patient elects to try ablation. Laparoscopic COUNSELING: The pros, cons, risks, benefits and alternatives all laparoscopic surgery were discussed.  The potential for injury to bowel, bladder, blood vessel and ureter was discussed.  The possible need for a blood transfusion discussed.  The probable need to remove the ovary was discussed.  The potential of recurrent disease or remnant ovary and need for more surgery was discussed.  The possible need to do a repair of the ureter and/or bowel and the need to open the abdomen was discussed.  The patient was informed that if the abdomen needed to be opened, that the incision might be a midline not a Pfannenstiel incision.  After the pros, cons risks and benefits were discussed the patient decided to have the surgery and she was consented for the procedures in usual fashion. All questions were answered.  Surgery scheduled for    Pelvic Ultrasound:   The uterus is retroverted and measures 8.3 centimeters in length, and 4.0 by 6.7 centimeters in  transaxial dimension. No shadowing object is seen within the uterus. Incidental note of nabothian cysts are made. Endometrial stripe is hyperechoic measuring 5 mm.    The right ovary measures 3.0 by 1.9 by 2.9 centimeters. It has a normal sonographic appearance with normal flow.     The left ovary measures 4.4 by 1.7 by 2.9 centimeters. It has a normal sonographic appearance with normal flow.    Pathology:   FRAGMENTS OF WEAKLY PROLIFERATIVE ENDOMETRIUM    Past Medical History:   Diagnosis Date    Abnormal Pap smear of cervix     rechecked and came back normal     Anemia     Endometriosis     Hypertension in pregnancy, preeclampsia, delivered      Past Surgical History:   Procedure Laterality Date    PELVIC LAPAROSCOPY      [facility] [place]- endometriosis     Family History   Problem Relation Age of Onset    Hypertension Father      Social History   Substance Use Topics    Smoking status: Former Smoker     Packs/day: 0.25     Types: Cigarettes     Start date: 1993     Quit date: 2015    Smokeless tobacco: Never Used    Alcohol use No     OB History    Para Term  AB Living   7 5 4 1 2 2   SAB TAB Ectopic Multiple Live Births           2      # Outcome Date GA Lbr Thee/2nd Weight Sex Delivery Anes PTL Lv   7 Term 08/20/15 38w0d  3.062 kg (6 lb 12 oz) F INDUCTION EPI N RITO   6  95 39w0d  2.863 kg (6 lb 5 oz) F INDUCTION None N RITO      Complications: S/P gastrochisis repair, follow-up exam   5 Term            4 Term            3 Term            2 AB            1 AB               Obstetric Comments   8-20-15 episiotomy performed        /78   Wt 91.6 kg (201 lb 15.1 oz)   LMP 10/23/2017 (Exact Date)   BMI 33.09 kg/m²     ROS:  GENERAL: Feeling well overall.   SKIN: Denies rash or lesions.   HEAD: Denies head injury or headache.   NODES: Denies enlarged lymph nodes.   CHEST: Denies chest pain or shortness of breath.   CARDIOVASCULAR: Denies palpitations or left  sided chest pain.   ABDOMEN: No abdominal pain, constipation, diarrhea, nausea, vomiting or rectal bleeding.   URINARY: No frequency, dysuria, hematuria, or burning on urination.  REPRODUCTIVE: See HPI.   BREASTS: Denies pain, lumps, or nipple discharge.   HEMATOLOGIC: No easy bruisability.  MUSCULOSKELETAL: Denies joint pain or swelling.   NEUROLOGIC: Denies syncope or weakness.   PSYCHIATRIC: Denies depression, anxiety or mood swings.      PHYSICAL EXAM:  APPEARANCE: Well nourished, well developed, in no acute distress.  AFFECT: WNL, alert and oriented x 3  SKIN: No acne or hirsutism  NECK: Neck symmetric without masses or thyromegaly  NODES: No inguinal, cervical, axillary, or femoral lymph node enlargement  CHEST: Good respiratory effect, CTAB  ABDOMEN: Soft.  No tenderness or masses.  No hepatosplenomegaly.  No hernias.  PELVIC: deferred    EXTREMITIES: No edema.    ASSESSMENT & PLAN:  AUB, sterilization    I have discussed the risks, benefits, indications, and alternatives of the procedure in detail.  The patient verbalizes her understanding.  All questions answered.  Consents signed.  The patient agrees to proceed to proceed as planned: 11/3/17 laparoscopic salpingectomy and endometrial ablation. We discuseed that this would not help with pelvic pain that may be associated with endometriosis      Adriane Patel MD  OB/GYN  Pager: [phone]

## 2017-10-31 NOTE — ANESTHESIA PREPROCEDURE EVALUATION
10/31/2017  Caridad De La Garza is a 42 y.o., female is scheduled for laparoscopic tubal ligation with hysteroscopic D&C with endometrial ablation under [facility] on 11/03/2017.    Past Surgical History:   Procedure Laterality Date    PELVIC LAPAROSCOPY  2003    [facility] [place]- endometri[place]       Anesthesia Evaluation    I have reviewed the Patient Summary Reports.    I have reviewed the Nursing Notes.   I have reviewed the Medications.     Review of Systems  Anesthesia Hx:  Hx of Anesthetic complications  History of prior surgery of interest to airway management or planning: Previous anesthesia: General Personal Hx of Anesthesia complications, Post-Operative Nausea/Vomiting, in the past, but not with recent anesthetics / prophylaxis   Social:  Non-Smoker, No Alcohol Use    Hematology/Oncology:  Hematology Normal        EENT/Dental:   chronic allergic rhinitis   Cardiovascular:   Exercise tolerance: good Denies Hypertension.  Denies Dysrhythmias.   Denies Angina.        Pulmonary:   Denies Shortness of breath.    Renal/:  Renal/ Normal     Hepatic/GI:   GERD, well controlled    OB/GYN/PEDS:  Heavy menstrual bleeding   Neurological:  Neurology Normal    Endocrine:  Endocrine Normal        Physical Exam  General:  Obesity    Airway/Jaw/Neck:  Airway Findings: Mouth Opening: Normal Tongue: Normal  General Airway Assessment: Adult  Mallampati: II  TM Distance: Normal, at least 6 cm        Eyes/Ears/Nose:  EYES/EARS/NOSE FINDINGS: Normal   Dental:  Dental Findings: In tact, Periodontal disease, Mild   Chest/Lungs:  Chest/Lungs Clear    Heart/Vascular:  Heart Findings: Normal Heart murmur: negative    Abdomen:  Abdomen Findings: Normal      Mental Status:  Mental Status Findings: Normal        Anesthesia Plan  Type of Anesthesia, risks & benefits discussed:  Anesthesia Type:  general  Patient's  Preference:   Intra-op Monitoring Plan:   Intra-op Monitoring Plan Comments:   Post Op Pain Control Plan:   Post Op Pain Control Plan Comments:   Induction:   IV  Beta Blocker:  Patient is not currently on a Beta-Blocker (No further documentation required).       Informed Consent: Patient understands risks and agrees with Anesthesia plan.  Questions answered.   ASA Score: 2     Day of Surgery Review of History & Physical:        Anesthesia Plan Notes: Anesthesia consent will be obtained prior to procedure on 11/03/2017.        Ready For Surgery From Anesthesia Perspective.

## 2017-11-01 ENCOUNTER — CLINICAL SUPPORT (OUTPATIENT)
Dept: LAB | Facility: HOSPITAL | Age: 42
End: 2017-11-01
Attending: INTERNAL MEDICINE
Payer: MEDICAID

## 2017-11-01 DIAGNOSIS — Z01.818 PRE-OP TESTING: ICD-10-CM

## 2017-11-01 DIAGNOSIS — N93.9 ABNORMAL UTERINE BLEEDING (AUB): ICD-10-CM

## 2017-11-01 LAB
ABO + RH BLD: NORMAL
ANION GAP SERPL CALC-SCNC: 8 MMOL/L
BASOPHILS # BLD AUTO: 0.03 K/UL
BASOPHILS NFR BLD: 0.5 %
BLD GP AB SCN CELLS X3 SERPL QL: NORMAL
BUN SERPL-MCNC: 12 MG/DL
CALCIUM SERPL-MCNC: 8.9 MG/DL
CHLORIDE SERPL-SCNC: 107 MMOL/L
CO2 SERPL-SCNC: 23 MMOL/L
CREAT SERPL-MCNC: 0.7 MG/DL
DIFFERENTIAL METHOD: ABNORMAL
EOSINOPHIL # BLD AUTO: 0.2 K/UL
EOSINOPHIL NFR BLD: 3.8 %
ERYTHROCYTE [DISTWIDTH] IN BLOOD BY AUTOMATED COUNT: 14.7 %
EST. GFR  (AFRICAN AMERICAN): >60 ML/MIN/1.73 M^2
EST. GFR  (NON AFRICAN AMERICAN): >60 ML/MIN/1.73 M^2
GLUCOSE SERPL-MCNC: 73 MG/DL
HCT VFR BLD AUTO: 35.2 %
HGB BLD-MCNC: 11.3 G/DL
LYMPHOCYTES # BLD AUTO: 1.9 K/UL
LYMPHOCYTES NFR BLD: 30.4 %
MCH RBC QN AUTO: 25.7 PG
MCHC RBC AUTO-ENTMCNC: 32.1 G/DL
MCV RBC AUTO: 80 FL
MONOCYTES # BLD AUTO: 0.5 K/UL
MONOCYTES NFR BLD: 7.1 %
NEUTROPHILS # BLD AUTO: 3.7 K/UL
NEUTROPHILS NFR BLD: 58 %
PLATELET # BLD AUTO: 255 K/UL
PMV BLD AUTO: 10.5 FL
POTASSIUM SERPL-SCNC: 4.1 MMOL/L
RBC # BLD AUTO: 4.39 M/UL
SODIUM SERPL-SCNC: 138 MMOL/L
WBC # BLD AUTO: 6.35 K/UL

## 2017-11-01 PROCEDURE — 86901 BLOOD TYPING SEROLOGIC RH(D): CPT

## 2017-11-01 PROCEDURE — 85025 COMPLETE CBC W/AUTO DIFF WBC: CPT

## 2017-11-01 PROCEDURE — 93005 ELECTROCARDIOGRAM TRACING: CPT

## 2017-11-01 PROCEDURE — 80048 BASIC METABOLIC PNL TOTAL CA: CPT

## 2017-11-01 PROCEDURE — 36415 COLL VENOUS BLD VENIPUNCTURE: CPT

## 2017-11-01 PROCEDURE — 86900 BLOOD TYPING SEROLOGIC ABO: CPT

## 2017-11-02 DIAGNOSIS — N92.6 IRREGULAR BLEEDING: ICD-10-CM

## 2017-11-02 DIAGNOSIS — J30.2 SEASONAL ALLERGIC RHINITIS: ICD-10-CM

## 2017-11-02 RX ORDER — TRIAMCINOLONE ACETONIDE 55 UG/1
SPRAY, METERED NASAL
Qty: 16.9 G | Refills: 0 | Status: SHIPPED | OUTPATIENT
Start: 2017-11-02 | End: 2018-03-11 | Stop reason: SDUPTHER

## 2017-11-02 RX ORDER — FLUTICASONE PROPIONATE 50 UG/1
SPRAY, METERED NASAL
Qty: 15.8 BOTTLE | Refills: 2 | Status: SHIPPED | OUTPATIENT
Start: 2017-11-02 | End: 2018-03-10 | Stop reason: SDUPTHER

## 2017-11-02 RX ORDER — MEDROXYPROGESTERONE ACETATE 10 MG/1
TABLET ORAL
Qty: 84 TABLET | Refills: 0 | Status: ON HOLD | OUTPATIENT
Start: 2017-11-02 | End: 2017-11-03

## 2017-11-03 ENCOUNTER — SURGERY (OUTPATIENT)
Age: 42
End: 2017-11-03

## 2017-11-03 ENCOUNTER — ANESTHESIA (OUTPATIENT)
Dept: SURGERY | Facility: HOSPITAL | Age: 42
End: 2017-11-03
Payer: MEDICAID

## 2017-11-03 ENCOUNTER — HOSPITAL ENCOUNTER (OUTPATIENT)
Facility: HOSPITAL | Age: 42
Discharge: HOME | End: 2017-11-03
Attending: OBSTETRICS & GYNECOLOGY | Admitting: OBSTETRICS & GYNECOLOGY
Payer: MEDICAID

## 2017-11-03 DIAGNOSIS — N93.9 ABNORMAL UTERINE BLEEDING (AUB): Primary | ICD-10-CM

## 2017-11-03 DIAGNOSIS — Z30.2 ENCOUNTER FOR STERILIZATION: ICD-10-CM

## 2017-11-03 LAB
B-HCG UR QL: NEGATIVE
CTP QC/QA: YES

## 2017-11-03 PROCEDURE — 81025 URINE PREGNANCY TEST: CPT | Performed by: OBSTETRICS & GYNECOLOGY

## 2017-11-03 PROCEDURE — 71000015 HC POSTOP RECOV 1ST HR: Performed by: OBSTETRICS & GYNECOLOGY

## 2017-11-03 PROCEDURE — 27201423 OPTIME MED/SURG SUP & DEVICES STERILE SUPPLY: Performed by: OBSTETRICS & GYNECOLOGY

## 2017-11-03 PROCEDURE — 88305 TISSUE EXAM BY PATHOLOGIST: CPT | Performed by: PATHOLOGY

## 2017-11-03 PROCEDURE — 58563 HYSTEROSCOPY ABLATION: CPT | Mod: 51,,, | Performed by: OBSTETRICS & GYNECOLOGY

## 2017-11-03 PROCEDURE — 25000003 PHARM REV CODE 250: Performed by: NURSE PRACTITIONER

## 2017-11-03 PROCEDURE — 71000016 HC POSTOP RECOV ADDL HR: Performed by: OBSTETRICS & GYNECOLOGY

## 2017-11-03 PROCEDURE — 37000009 HC ANESTHESIA EA ADD 15 MINS: Performed by: OBSTETRICS & GYNECOLOGY

## 2017-11-03 PROCEDURE — 71000033 HC RECOVERY, INTIAL HOUR: Performed by: OBSTETRICS & GYNECOLOGY

## 2017-11-03 PROCEDURE — 37000008 HC ANESTHESIA 1ST 15 MINUTES: Performed by: OBSTETRICS & GYNECOLOGY

## 2017-11-03 PROCEDURE — 88305 TISSUE EXAM BY PATHOLOGIST: CPT | Mod: 26,,, | Performed by: PATHOLOGY

## 2017-11-03 PROCEDURE — 71000039 HC RECOVERY, EACH ADD'L HOUR: Performed by: OBSTETRICS & GYNECOLOGY

## 2017-11-03 PROCEDURE — 63600175 PHARM REV CODE 636 W HCPCS: Performed by: ANESTHESIOLOGY

## 2017-11-03 PROCEDURE — 63600175 PHARM REV CODE 636 W HCPCS: Performed by: NURSE ANESTHETIST, CERTIFIED REGISTERED

## 2017-11-03 PROCEDURE — 36000709 HC OR TIME LEV III EA ADD 15 MIN: Performed by: OBSTETRICS & GYNECOLOGY

## 2017-11-03 PROCEDURE — 58661 LAPAROSCOPY REMOVE ADNEXA: CPT | Mod: 50,,, | Performed by: OBSTETRICS & GYNECOLOGY

## 2017-11-03 PROCEDURE — 25000003 PHARM REV CODE 250: Performed by: NURSE ANESTHETIST, CERTIFIED REGISTERED

## 2017-11-03 PROCEDURE — 36000708 HC OR TIME LEV III 1ST 15 MIN: Performed by: OBSTETRICS & GYNECOLOGY

## 2017-11-03 RX ORDER — SUCCINYLCHOLINE CHLORIDE 20 MG/ML
INJECTION INTRAMUSCULAR; INTRAVENOUS
Status: DISCONTINUED | OUTPATIENT
Start: 2017-11-03 | End: 2017-11-03

## 2017-11-03 RX ORDER — LIDOCAINE HYDROCHLORIDE 20 MG/ML
INJECTION, SOLUTION EPIDURAL; INFILTRATION; INTRACAUDAL; PERINEURAL
Status: DISCONTINUED | OUTPATIENT
Start: 2017-11-03 | End: 2017-11-03

## 2017-11-03 RX ORDER — DIPHENHYDRAMINE HYDROCHLORIDE 50 MG/ML
25 INJECTION INTRAMUSCULAR; INTRAVENOUS EVERY 4 HOURS PRN
Status: DISCONTINUED | OUTPATIENT
Start: 2017-11-03 | End: 2017-11-03 | Stop reason: HOSPADM

## 2017-11-03 RX ORDER — GLYCOPYRROLATE 0.2 MG/ML
INJECTION INTRAMUSCULAR; INTRAVENOUS
Status: DISCONTINUED | OUTPATIENT
Start: 2017-11-03 | End: 2017-11-03

## 2017-11-03 RX ORDER — ROCURONIUM BROMIDE 10 MG/ML
INJECTION, SOLUTION INTRAVENOUS
Status: DISCONTINUED | OUTPATIENT
Start: 2017-11-03 | End: 2017-11-03

## 2017-11-03 RX ORDER — HYDROMORPHONE HYDROCHLORIDE 1 MG/ML
1 INJECTION, SOLUTION INTRAMUSCULAR; INTRAVENOUS; SUBCUTANEOUS EVERY 4 HOURS PRN
Status: DISCONTINUED | OUTPATIENT
Start: 2017-11-03 | End: 2017-11-03 | Stop reason: HOSPADM

## 2017-11-03 RX ORDER — LIDOCAINE HYDROCHLORIDE 10 MG/ML
1 INJECTION, SOLUTION EPIDURAL; INFILTRATION; INTRACAUDAL; PERINEURAL ONCE
Status: DISCONTINUED | OUTPATIENT
Start: 2017-11-03 | End: 2017-11-03

## 2017-11-03 RX ORDER — DEXAMETHASONE SODIUM PHOSPHATE 4 MG/ML
INJECTION, SOLUTION INTRA-ARTICULAR; INTRALESIONAL; INTRAMUSCULAR; INTRAVENOUS; SOFT TISSUE
Status: DISCONTINUED | OUTPATIENT
Start: 2017-11-03 | End: 2017-11-03

## 2017-11-03 RX ORDER — DIPHENHYDRAMINE HCL 25 MG
25 CAPSULE ORAL EVERY 4 HOURS PRN
Status: DISCONTINUED | OUTPATIENT
Start: 2017-11-03 | End: 2017-11-03 | Stop reason: HOSPADM

## 2017-11-03 RX ORDER — HYDROCODONE BITARTRATE AND ACETAMINOPHEN 10; 325 MG/1; MG/1
1 TABLET ORAL EVERY 4 HOURS PRN
Status: DISCONTINUED | OUTPATIENT
Start: 2017-11-03 | End: 2017-11-03 | Stop reason: HOSPADM

## 2017-11-03 RX ORDER — ONDANSETRON 2 MG/ML
4 INJECTION INTRAMUSCULAR; INTRAVENOUS ONCE AS NEEDED
Status: DISCONTINUED | OUTPATIENT
Start: 2017-11-03 | End: 2017-11-03 | Stop reason: HOSPADM

## 2017-11-03 RX ORDER — ACETAMINOPHEN 10 MG/ML
INJECTION, SOLUTION INTRAVENOUS
Status: DISCONTINUED | OUTPATIENT
Start: 2017-11-03 | End: 2017-11-03

## 2017-11-03 RX ORDER — PROPOFOL 10 MG/ML
VIAL (ML) INTRAVENOUS
Status: DISCONTINUED | OUTPATIENT
Start: 2017-11-03 | End: 2017-11-03

## 2017-11-03 RX ORDER — SODIUM CHLORIDE 0.9 % (FLUSH) 0.9 %
3 SYRINGE (ML) INJECTION EVERY 8 HOURS
Status: DISCONTINUED | OUTPATIENT
Start: 2017-11-03 | End: 2017-11-03 | Stop reason: HOSPADM

## 2017-11-03 RX ORDER — HYDROMORPHONE HYDROCHLORIDE 2 MG/ML
0.5 INJECTION, SOLUTION INTRAMUSCULAR; INTRAVENOUS; SUBCUTANEOUS EVERY 5 MIN PRN
Status: DISCONTINUED | OUTPATIENT
Start: 2017-11-03 | End: 2017-11-03 | Stop reason: HOSPADM

## 2017-11-03 RX ORDER — ONDANSETRON 8 MG/1
8 TABLET, ORALLY DISINTEGRATING ORAL EVERY 8 HOURS PRN
Status: DISCONTINUED | OUTPATIENT
Start: 2017-11-03 | End: 2017-11-03 | Stop reason: HOSPADM

## 2017-11-03 RX ORDER — NEOSTIGMINE METHYLSULFATE 1 MG/ML
INJECTION, SOLUTION INTRAVENOUS
Status: DISCONTINUED | OUTPATIENT
Start: 2017-11-03 | End: 2017-11-03

## 2017-11-03 RX ORDER — HYDROCODONE BITARTRATE AND ACETAMINOPHEN 5; 325 MG/1; MG/1
1 TABLET ORAL EVERY 4 HOURS PRN
Status: DISCONTINUED | OUTPATIENT
Start: 2017-11-03 | End: 2017-11-03 | Stop reason: HOSPADM

## 2017-11-03 RX ORDER — SODIUM CHLORIDE 0.9 % (FLUSH) 0.9 %
3 SYRINGE (ML) INJECTION
Status: DISCONTINUED | OUTPATIENT
Start: 2017-11-03 | End: 2017-11-03 | Stop reason: HOSPADM

## 2017-11-03 RX ORDER — SODIUM CHLORIDE, SODIUM LACTATE, POTASSIUM CHLORIDE, CALCIUM CHLORIDE 600; 310; 30; 20 MG/100ML; MG/100ML; MG/100ML; MG/100ML
INJECTION, SOLUTION INTRAVENOUS CONTINUOUS
Status: DISCONTINUED | OUTPATIENT
Start: 2017-11-03 | End: 2017-11-03

## 2017-11-03 RX ORDER — FENTANYL CITRATE 50 UG/ML
INJECTION, SOLUTION INTRAMUSCULAR; INTRAVENOUS
Status: DISCONTINUED | OUTPATIENT
Start: 2017-11-03 | End: 2017-11-03

## 2017-11-03 RX ORDER — MIDAZOLAM HYDROCHLORIDE 1 MG/ML
INJECTION, SOLUTION INTRAMUSCULAR; INTRAVENOUS
Status: DISCONTINUED | OUTPATIENT
Start: 2017-11-03 | End: 2017-11-03

## 2017-11-03 RX ORDER — SCOLOPAMINE TRANSDERMAL SYSTEM 1 MG/1
1 PATCH, EXTENDED RELEASE TRANSDERMAL
Status: COMPLETED | OUTPATIENT
Start: 2017-11-03 | End: 2017-11-03

## 2017-11-03 RX ORDER — ONDANSETRON 2 MG/ML
INJECTION INTRAMUSCULAR; INTRAVENOUS
Status: DISCONTINUED | OUTPATIENT
Start: 2017-11-03 | End: 2017-11-03

## 2017-11-03 RX ADMIN — SODIUM CHLORIDE, SODIUM LACTATE, POTASSIUM CHLORIDE, AND CALCIUM CHLORIDE: .6; .31; .03; .02 INJECTION, SOLUTION INTRAVENOUS at 07:11

## 2017-11-03 RX ADMIN — SCOPALAMINE 1 PATCH: 1 PATCH, EXTENDED RELEASE TRANSDERMAL at 06:11

## 2017-11-03 RX ADMIN — ROCURONIUM BROMIDE 5 MG: 10 INJECTION, SOLUTION INTRAVENOUS at 07:11

## 2017-11-03 RX ADMIN — LIDOCAINE HYDROCHLORIDE 80 MG: 20 INJECTION, SOLUTION EPIDURAL; INFILTRATION; INTRACAUDAL; PERINEURAL at 07:11

## 2017-11-03 RX ADMIN — DEXAMETHASONE SODIUM PHOSPHATE 8 MG: 4 INJECTION, SOLUTION INTRAMUSCULAR; INTRAVENOUS at 07:11

## 2017-11-03 RX ADMIN — FENTANYL CITRATE 50 MCG: 50 INJECTION, SOLUTION INTRAMUSCULAR; INTRAVENOUS at 07:11

## 2017-11-03 RX ADMIN — FENTANYL CITRATE 100 MCG: 50 INJECTION, SOLUTION INTRAMUSCULAR; INTRAVENOUS at 07:11

## 2017-11-03 RX ADMIN — HYDROMORPHONE HYDROCHLORIDE 0.5 MG: 2 INJECTION, SOLUTION INTRAMUSCULAR; INTRAVENOUS; SUBCUTANEOUS at 09:11

## 2017-11-03 RX ADMIN — PROPOFOL 150 MG: 10 INJECTION, EMULSION INTRAVENOUS at 07:11

## 2017-11-03 RX ADMIN — MIDAZOLAM 1 MG: 1 INJECTION INTRAMUSCULAR; INTRAVENOUS at 06:11

## 2017-11-03 RX ADMIN — NEOSTIGMINE METHYLSULFATE 3 MG: 1 INJECTION INTRAVENOUS at 08:11

## 2017-11-03 RX ADMIN — GLYCOPYRROLATE 0.4 MG: 0.2 INJECTION, SOLUTION INTRAMUSCULAR; INTRAVENOUS at 08:11

## 2017-11-03 RX ADMIN — ACETAMINOPHEN 1000 MG: 10 INJECTION, SOLUTION INTRAVENOUS at 08:11

## 2017-11-03 RX ADMIN — ONDANSETRON 8 MG: 2 INJECTION, SOLUTION INTRAMUSCULAR; INTRAVENOUS at 08:11

## 2017-11-03 RX ADMIN — FENTANYL CITRATE 50 MCG: 50 INJECTION, SOLUTION INTRAMUSCULAR; INTRAVENOUS at 08:11

## 2017-11-03 RX ADMIN — SODIUM CHLORIDE, SODIUM LACTATE, POTASSIUM CHLORIDE, AND CALCIUM CHLORIDE: .6; .31; .03; .02 INJECTION, SOLUTION INTRAVENOUS at 06:11

## 2017-11-03 RX ADMIN — SUCCINYLCHOLINE CHLORIDE 140 MG: 20 INJECTION, SOLUTION INTRAMUSCULAR; INTRAVENOUS at 07:11

## 2017-11-03 NOTE — TRANSFER OF CARE
"Anesthesia Transfer of Care Note    Patient: Caridad De La Garza    Procedure(s) Performed: Procedure(s) (LRB):  ABLATION-ENDOMETRIAL (N/A)  [id number] (N/A)  SALPINGECTOMY-LAPAROSCOPIC (Bilateral)    Patient location: PACU    Anesthesia Type: general    Transport from OR: Transported from OR on 6-10 L/min O2 by face mask with adequate spontaneous ventilation    Post pain: adequate analgesia    Post assessment: no apparent anesthetic complications and tolerated procedure well    Post vital signs: stable    Level of consciousness: awake    Nausea/Vomiting: no nausea/vomiting    Complications: none    Transfer of care protocol was followed      Last vitals:   Visit Vitals  /72 (BP Location: Right arm, Patient Position: Lying)   Pulse 80   Temp 36.6 °C (97.9 °F) (Oral)   Resp 20   Ht 5' 4.5" (1.638 m)   Wt 91.6 kg (201 lb 15.1 oz)   LMP 10/23/2017 (Exact Date)   SpO2 98%   BMI 34.13 kg/m²     "

## 2017-11-03 NOTE — INTERVAL H&P NOTE
The patient has been seen and the H&P has been reviewed: No interval changes since last clinic visit.  Proceed to OR for scheduled procedure.: Bilateral salpingectomy and endometrial ablation    Adriane Patel MD  OB/GYN  Pager: [phone]            Active Hospital Problems    Diagnosis  POA    *Abnormal uterine bleeding (AUB) [N93.9]  Yes    Encounter for sterilization [Z30.2]  Not Applicable      Resolved Hospital Problems    Diagnosis Date Resolved POA   No resolved problems to display.

## 2017-11-03 NOTE — DISCHARGE INSTRUCTIONS
BATHING:                   You may shower after your dressing is removed, but no tub baths, hot tubs, saunas or swimming until you see the doctor.      ACTIVITY LEVEL: If you have received sedation or an anesthetic, you may feel sleepy for   several hours. Rest until you are more awake. Gradually resume your normal activities  ? No heavy lifting or straining, nothing over 10 lbs., like a gallon of milk.  ? Pelvic rest- no sex, tampons or douching until follow up or instructed by doctor.    DIET:  You may resume your home diet. If nausea is present, increase your diet gradually with fluids and bland foods.    Medications:  Pain medication should be taken only if needed and as directed. If antibiotics are prescribed, the medication should be taken until completed. You will be given an updated list of you medications.  ? No driving, alcoholic beverages or signing legal documents for next 24 hours or while taking pain medication    CALL THE DOCTOR:    For any obvious bleeding (some dried blood over the incision is normal).      Redness, swelling, foul smell around incision or fever over 101.   Shortness of breath, Coughing up Bloody Sputum or Pains or Swelling in your calves.   Persistent pain or nausea not relieved by medication.   If vaginal bleeding is in excess of a normal period.   Problems urinating    If any unusual problems or difficulties occur contact your doctor. If you cannot contact your doctor but feel your signs and symptoms warrant a physicians attention return to the emergency room.          Discharge Instructions: After Your Surgery  Youve just had surgery. During surgery you were given medicine called anesthesia to keep you relaxed and free of pain. After surgery you may have some pain or nausea. This is common. Here are some tips for feeling better and getting well after surgery.     Stay on schedule with your medication.   Going home  Your doctor or nurse will show you how to take care of  yourself when you go home. He or she will also answer your questions. Have an adult family member or friend drive you home. For the first 24 hours after your surgery:  · Do not drive or use heavy equipment.  · Do not make important decisions or sign legal papers.  · Do not drink alcohol.  · Have someone stay with you, if needed. He or she can watch for problems and help keep you safe.  Be sure to go to all follow-up visits with your doctor. And rest after your surgery for as long as your doctor tells you to.  Coping with pain  If you have pain after surgery, pain medicine will help you feel better. Take it as told, before pain becomes severe. Also, ask your doctor or pharmacist about other ways to control pain. This might be with heat, ice, or relaxation. And follow any other instructions your surgeon or nurse gives you.  Tips for taking pain medicine  To get the best relief possible, remember these points:  · Pain medicines can upset your stomach. Taking them with a little food may help.  · Most pain relievers taken by mouth need at least 20 to 30 minutes to start to work.  · Taking medicine on a schedule can help you remember to take it. Try to time your medicine so that you can take it before starting an activity. This might be before you get dressed, go for a walk, or sit down for dinner.  · Constipation is a common side effect of pain medicines. Call your doctor before taking any medicines such as laxatives or stool softeners to help ease constipation. Also ask if you should skip any foods. Drinking lots of fluids and eating foods such as fruits and vegetables that are high in fiber can also help. Remember, do not take laxatives unless your surgeon has prescribed them.  · Drinking alcohol and taking pain medicine can cause dizziness and slow your breathing. It can even be deadly. Do not drink alcohol while taking pain medicine.  · Pain medicine can make you react more slowly to things. Do not drive or run  machinery while taking pain medicine.  Your health care provider may tell you to take acetaminophen to help ease your pain. Ask him or her how much you are supposed to take each day. Acetaminophen or other pain relievers may interact with your prescription medicines or other over-the-counter (OTC) drugs. Some prescription medicines have acetaminophen and other ingredients. Using both prescription and OTC acetaminophen for pain can cause you to overdose. Read the labels on your OTC medicines with care. This will help you to clearly know the list of ingredients, how much to take, and any warnings. It may also help you not take too much acetaminophen. If you have questions or do not understand the information, ask your pharmacist or health care provider to explain it to you before you take the OTC medicine.  Managing nausea  Some people have an upset stomach after surgery. This is often because of anesthesia, pain, or pain medicine, or the stress of surgery. These tips will help you handle nausea and eat healthy foods as you get better. If you were on a special food plan before surgery, ask your doctor if you should follow it while you get better. These tips may help:  · Do not push yourself to eat. Your body will tell you when to eat and how much.  · Start off with clear liquids and soup. They are easier to digest.  · Next try semi-solid foods, such as mashed potatoes, applesauce, and gelatin, as you feel ready.  · Slowly move to solid foods. Dont eat fatty, rich, or spicy foods at first.  · Do not force yourself to have 3 large meals a day. Instead eat smaller amounts more often.  · Take pain medicines with a small amount of solid food, such as crackers or toast, to avoid nausea.     Call your surgeon if  · You still have pain an hour after taking medicine. The medicine may not be strong enough.  · You feel too sleepy, dizzy, or groggy. The medicine may be too strong.  · You have side effects like nausea, vomiting,  or skin changes, such as rash, itching, or hives.       If you have obstructive sleep apnea  You were given anesthesia medicine during surgery to keep you comfortable and free of pain. After surgery, you may have more apnea spells because of this medicine and other medicines you were given. The spells may last longer than usual.   At home:  · Keep using the continuous positive airway pressure (CPAP) device when you sleep. Unless your health care provider tells you not to, use it when you sleep, day or night. CPAP is a common device used to treat obstructive sleep apnea.  · Talk with your provider before taking any pain medicine, muscle relaxants, or sedatives. Your provider will tell you about the possible dangers of taking these medicines.  © 7984-7837 [employer]. [address] [place] [address], [place] [address]. All rights reserved. This information is not intended as a substitute for professional medical care. Always follow your healthcare professional's instructions.

## 2017-11-03 NOTE — TELEPHONE ENCOUNTER
----- Message from Travis Zarate sent at 11/2/2017  4:25 PM CDT -----  Contact: [phone]/self  Pt states she need a prior authorization is needed for her pain medication.  Please call and advise

## 2017-11-03 NOTE — PLAN OF CARE
Pt tolerating liquids with no difficulty. Denies pain or discomfort @ this time. Cont to monitor.

## 2017-11-03 NOTE — OP NOTE
OPERATIVE REPORT    DATE OF PROCEDURE: 11/3/17    PREOPERATIVE DIAGNOSIS:   1. Desires sterilization  2. Abnormal uterine bleeding- N    POSTOPERATIVE DIAGNOSIS:   1. Desires sterilization  2. Abnormal uterine bleeding- N    PROCEDURE:   1. Laparoscopic Bilateral salpingectomy  2. Lana endometrial ablation    SURGEON: ELIDA Patel MD    ASSISTANT: Erma Angeles     ESTIMATED BLOOD LOSS: 10 mL     INTRAVENOUS FLUIDS: 1000 mL     URINE OUTPUT: 200 mL    HYSTEROSCOPIC FLUIDS IN: 100mL    HYSTEROSCOPIC FLUIDS IN: 100mL    HYSTEROSCOPIC FLUID DEFICIT:  0mL    SPECIMEN: bilateral tubal segments    OPERATIVE FINDINGS: On laparoscopy, retroverted uterus. Some filmy adhesions noted. Endometrial implants on uterine serosa, Otherwise, normal tubes and ovaries bilaterally.     PROCEDURE IN DETAIL:   The patient was taken to the Operating Room where general anesthesia was achieved. She was prepped and draped in normal sterile fashion and [facility] in [facility] stirr[facility]. Molina catheter was placed. A sterile speculum was placed in the vagina. Single tooth tenaculum placed on the anterior lip of the cervix. The uterus was sounded to 9cm. The patient underwent serial dilation to 8mm in order to accommodate the hysteroscope. The hysteroscope was advanced through the cervical os while instilling sterile saline. The uterine cavity was visualized and found to be within normal limits. The hysteroscope was removed after obtaining cavity length. The Lana device was placed inside the uterine cavity.  The cavity length was obtained at 6.5. Ablation was performed for 120seconds. Hysteroscope was again placed inside the uterine cavity.  The endometrium was found to be adequate ablated.  The tenaculum was removed from the anterior lip of the cervix and adequate hemostasis was achieved.     Attention was then turned to the abdomen where a small 1 cm infraumbilical skin incision was made with the scalpel. Veress needle was introduced into the  abdomen through the umbilicus. Confirmation into the intraabdominal cavity was confirmed with a water drop test. An 11 mm port was then placed at the umbilical site under direct visualization then the abdomen was insufflated. The camera was used to inspect the abdomen. The patient was placed in trendelenburg. Two 5 mm  lateral skin incisions were made with a scalpel and 5 mm trocars was advanced through theses incisions under visualization of the camera.     The left fallopian tube was then grasped, cauterized, and transected from its mesosalpinx using the ligasure. It was removed throught the 5mm trocar. Hemostasis noted. In a similar fashion the right fallopian tube was grasped, cauterized, and transected from its mesosalpinx using the ligasure. It was removed throught the 10mm trocar. Hemostasis was noted. The procedure was then complete. The abdomen was desufflated. All port sites were removed. The 11mm fascia was closed with 0- Vicryl. The skin of the 5mm port sites were closed dermabond and the 11mm site with 4-0 Monocryl. The sponge stick and goodrich were then removed. The patient tolerated the procedure well. Sponge, lap and needle counts were correct. She was taken to Recovery Room in stable condition.    Adriane Patel MD, FACOG  OB/GYN  Pager: [phone]

## 2017-11-03 NOTE — DISCHARGE SUMMARY
[facility]  Obstetrics & Gynecology  Discharge Summary    Patient Name: Caridad De La Garza  MRN: [id number]  Admission Date: 11/3/2017  Hospital Length of Stay: 0 days  Discharge Date: 11/3/17  Attending Physician: Adriane Patel MD   Discharging Provider: Adriane Patel MD  Primary Care Provider: Abdifatah Auguste MD    Patient was admitted for an outpatient procedure (hysteroscopy with jeremiah ablation, bilateral salpingectomy secondary to AUB-N and sterilization) and tolerated the procedure well with no complications. Please see operative report for further details. Following the procedure the patient was awakened from anesthesia and transferred to the recovery area in stable condition. Patient was discharged to home once ambulating, voiding, tolerating clear liquids and pain well controlled. Patient given routine post-op instructions and prescriptions for pain medication to take as needed. Patient instructed to follow up with me in 6 weeks for postoperative appointment.       Procedure(s) (LRB):  ABLATION-ENDOMETRIAL (N/A)  [id number] (N/A)  SALPINGECTOMY-LAPAROSCOPIC (Bilateral)       Pending Diagnostic Studies:     None        Final Active Diagnoses:    Diagnosis Date Noted POA    PRINCIPAL PROBLEM:  Abnormal uterine bleeding (AUB) [N93.9] 10/31/2017 Yes    Encounter for sterilization [Z30.2] 10/31/2017 Not Applicable      Problems Resolved During this Admission:    Diagnosis Date Noted Date Resolved POA        Discharged Condition: good    Disposition: Home or Self Care    Follow Up:  Follow-up Information     Adriane Patel MD In 6 weeks.    Specialties:  Obstetrics, Obstetrics and Gynecology  Why:  Postoperative visit  Contact information:  [address]  [address] [place] [id number]  [phone]                 Patient Instructions:     Diet general     Activity as tolerated     Call MD for:  persistent nausea and vomiting or diarrhea     Call MD for:   temperature >100.4     Call MD for:  severe uncontrolled pain     Call MD for:  difficulty breathing or increased cough     Call MD for:  severe persistent headache     Call MD for:  persistent dizziness, light-headedness, or visual disturbances     Call MD for:  worsening rash     Call MD for:  increased confusion or weakness       Medications:  Reconciled Home Medications:   Current Discharge Medication List      CONTINUE these medications which have NOT CHANGED    Details   clotrimazole (LOTRIMIN) 1 % cream CESAR TO THE AFFECTED AREA  QD TO BID  Refills: 3      FLONASE ALLERGY RELIEF 50 mcg/actuation nasal spray INSTILL 2 SPRAYS IN EACH NOSTRIL ONCE DAILY  Qty: 15.8 Bottle, Refills: 2    Associated Diagnoses: Seasonal allergic rhinitis      hydroquinone 4 % Crea APPLY AA BID  Refills: 3      ketoconazole (NIZORAL) 2 % shampoo CESAR UTD QD  Refills: 3      montelukast (SINGULAIR) 10 mg tablet       benzoyl peroxide 5% 5 % gel CESAR TO THE AFFECTED AREA BID  Refills: 2      cetirizine (ZYRTEC) 5 MG chewable tablet Take 1 tablet (5 mg total) by mouth once daily.  Refills: 0    Associated Diagnoses: Seasonal allergic rhinitis, unspecified allergic rhinitis trigger      famotidine (PEPCID) 20 MG tablet TAKE ONE TABLET BY MOUTH TWICE DAILY  Qty: 60 tablet, Refills: 0    Associated Diagnoses: Gastroesophageal reflux disease without esophagitis      ketotifen (ZADITOR) 0.025 % (0.035 %) ophthalmic solution INT 1 GTT OU BID  Refills: 2      oxyCODONE-acetaminophen (PERCOCET) 5-325 mg per tablet Take 1 tablet by mouth every 6 (six) hours as needed for Pain.  Qty: 20 tablet, Refills: 0    Associated Diagnoses: Abnormal uterine bleeding (AUB); Encounter for sterilization      prednisoLONE acetate (PRED FORTE) 1 % DrpS SHAKE LQ AND INT 1 GTT IN OU BID  Refills: 0      tretinoin (RETIN-A) 0.05 % cream APPLY HS  Refills: 3      triamcinolone (NASACORT) 55 mcg nasal inhaler USE 2 SPRAYS IN EACH NOSTRIL DAILY  Qty: 16.9 g, Refills: 0     Associated Diagnoses: Seasonal allergic rhinitis         STOP taking these medications       medroxyPROGESTERone (PROVERA) 10 MG tablet Comments:   Reason for Stopping:               Adriane Patel MD  Obstetrics & Gynecology  [facility]

## 2017-11-03 NOTE — PLAN OF CARE
Up to bathroom with no assistance. Urinated large amount of clear yellow with no difficulty. Denies pain or discomfort @ this time. PIV dc'd with tip intact. Dressing placed. Waiting for spouse to arrive. Cont to monitor.  Pt denies pain or discomfort @ this time.  Pt states she is ready to be discharged home @ this time.  Discharge instructions reviewed with patient and family, with time allotted for questions. Pt and family verbalize understanding of instructions and state they have no further questions @ this time.

## 2017-11-06 VITALS
SYSTOLIC BLOOD PRESSURE: 118 MMHG | OXYGEN SATURATION: 97 % | DIASTOLIC BLOOD PRESSURE: 71 MMHG | HEIGHT: 65 IN | HEART RATE: 78 BPM | WEIGHT: 201.94 LBS | BODY MASS INDEX: 33.65 KG/M2 | RESPIRATION RATE: 18 BRPM | TEMPERATURE: 98 F

## 2017-11-13 ENCOUNTER — TELEPHONE (OUTPATIENT)
Dept: OBSTETRICS AND GYNECOLOGY | Facility: CLINIC | Age: 42
End: 2017-11-13

## 2017-11-13 NOTE — TELEPHONE ENCOUNTER
Attempted to contact pt regarding getting her scheduled for a sooner post op visit. No answer, left message for pt to return call.

## 2017-11-13 NOTE — TELEPHONE ENCOUNTER
----- Message from Dee Sargent sent at 11/13/2017  8:03 AM CST -----  Contact: [phone]/ self   Pt requesting to speak with you in regarding her surgery coming up . Please advise

## 2017-11-13 NOTE — TELEPHONE ENCOUNTER
The clear discharge is likely from hysteroscopy but I would like for pt to come in for evaluation to rule out infection. Make sure no fever/chills.    Adriane Patel MD, FACOG  OB/GYN  Pager: [phone]

## 2017-11-13 NOTE — TELEPHONE ENCOUNTER
----- Message from Dee Sargent sent at 11/13/2017  9:38 AM CST -----  Contact: [phone]/ self   Patient called in returning your call. Please advise

## 2017-11-13 NOTE — TELEPHONE ENCOUNTER
Returned pt's call. Pt stated her left side is still very tender/sore and she is also experiencing a clear/yellow liquid discharge. Pt would like to know if this is normal and if she should be putting anything on her incision. Pt scheduled for 12/08 at 11:00am for post op. Please advise.

## 2017-12-08 ENCOUNTER — OFFICE VISIT (OUTPATIENT)
Dept: OBSTETRICS AND GYNECOLOGY | Facility: CLINIC | Age: 42
End: 2017-12-08
Payer: MEDICAID

## 2017-12-08 VITALS — WEIGHT: 205.69 LBS | BODY MASS INDEX: 34.76 KG/M2

## 2017-12-08 DIAGNOSIS — Z98.890 POST-OPERATIVE STATE: Primary | ICD-10-CM

## 2017-12-08 PROCEDURE — 99212 OFFICE O/P EST SF 10 MIN: CPT | Mod: PBBFAC,PO | Performed by: OBSTETRICS & GYNECOLOGY

## 2017-12-08 PROCEDURE — 99999 PR PBB SHADOW E&M-EST. PATIENT-LVL II: CPT | Mod: PBBFAC,,, | Performed by: OBSTETRICS & GYNECOLOGY

## 2017-12-08 PROCEDURE — 99213 OFFICE O/P EST LOW 20 MIN: CPT | Mod: S$PBB,,, | Performed by: OBSTETRICS & GYNECOLOGY

## 2017-12-11 NOTE — PROGRESS NOTES
CC: Postoperative visit    Caridad De La Garza is a 42 y.o. female  presents for a postoperative visit s/p BTL/endometrial ablation.  Her postoperative course was complicated.  She is doing well postoperative. Had watery discharge at first. Pathology and intraop findings reviewed with patient      Wt 93.3 kg (205 lb 11 oz)   LMP 2017 (Within Days)   BMI 34.76 kg/m²     ROS:  GENERAL: No fever, chills, fatigability or weight loss.  VULVAR: No pain, no lesions and no itching.  VAGINAL: No relaxation, no itching, no discharge, no abnormal bleeding and no lesions.  ABDOMEN: No abdominal pain. Denies nausea. Denies vomiting. No diarrhea. No constipation  BREAST: Denies pain. No lumps. No discharge.  URINARY: No incontinence, no nocturia, no frequency and no dysuria.  CARDIOVASCULAR: No chest pain. No shortness of breath. No leg cramps.  NEUROLOGICAL: No headaches. No vision changes.    Physical Exam  PELVIC: Normal external genitalia without lesions.  Normal hair distribution.  Adequate perineal body, normal urethral meatus.  Vagina moist and well rugated without lesions or discharge.  Cervix pink, without lesions, discharge or tenderness.  No significant cystocele or rectocele.  Bimanual exam shows uterus to be normal size, regular, mobile and nontender.  Adnexa without masses or tenderness.      1. Post-operative state         Patient can return to normal activities.  Return to clinic in 1 year for well woman exam.    Adriane Patel MD, FACOG  OB/GYN  Pager: [phone]

## 2018-01-09 ENCOUNTER — OFFICE VISIT (OUTPATIENT)
Dept: URGENT CARE | Facility: CLINIC | Age: 43
End: 2018-01-09
Payer: MEDICAID

## 2018-01-09 VITALS
DIASTOLIC BLOOD PRESSURE: 86 MMHG | WEIGHT: 216 LBS | SYSTOLIC BLOOD PRESSURE: 132 MMHG | HEART RATE: 76 BPM | OXYGEN SATURATION: 98 % | BODY MASS INDEX: 35.99 KG/M2 | HEIGHT: 65 IN | TEMPERATURE: 99 F | RESPIRATION RATE: 20 BRPM

## 2018-01-09 DIAGNOSIS — J06.9 VIRAL URI WITH COUGH: Primary | ICD-10-CM

## 2018-01-09 LAB
CTP QC/QA: YES
FLUAV AG NPH QL: NEGATIVE
FLUBV AG NPH QL: NEGATIVE

## 2018-01-09 PROCEDURE — 87804 INFLUENZA ASSAY W/OPTIC: CPT | Mod: QW,S$GLB,, | Performed by: EMERGENCY MEDICINE

## 2018-01-09 PROCEDURE — 99214 OFFICE O/P EST MOD 30 MIN: CPT | Mod: S$GLB,,, | Performed by: EMERGENCY MEDICINE

## 2018-01-09 RX ORDER — PROMETHAZINE HYDROCHLORIDE AND CODEINE PHOSPHATE 6.25; 1 MG/5ML; MG/5ML
5 SOLUTION ORAL EVERY 4 HOURS PRN
Qty: 120 ML | Refills: 0 | Status: SHIPPED | OUTPATIENT
Start: 2018-01-09 | End: 2018-01-19

## 2018-01-09 RX ORDER — PREDNISONE 20 MG/1
20 TABLET ORAL DAILY
Qty: 3 TABLET | Refills: 0 | Status: SHIPPED | OUTPATIENT
Start: 2018-01-09 | End: 2018-01-12

## 2018-01-09 NOTE — PROGRESS NOTES
"Subjective:       Patient ID: Caridad De La Garza is a 42 y.o. female.    Vitals:  height is 5' 5" (1.651 m) and weight is 98 kg (216 lb). Her oral temperature is 99.2 °F (37.3 °C). Her blood pressure is 132/86 and her pulse is 76. Her respiration is 20 and oxygen saturation is 98%.     Chief Complaint: URI    URI    This is a new problem. The current episode started today. The problem has been unchanged. There has been no fever. Associated symptoms include congestion, coughing, ear pain and a sore throat. Pertinent negatives include no abdominal pain, chest pain, headaches, nausea or wheezing. She has tried nothing for the symptoms. The treatment provided no relief.     Review of Systems   Constitution: Positive for malaise/fatigue. Negative for chills and fever.   HENT: Positive for congestion, ear pain and sore throat. Negative for hoarse voice.    Eyes: Negative for discharge and redness.   Cardiovascular: Negative for chest pain, dyspnea on exertion and leg swelling.   Respiratory: Positive for cough and sputum production. Negative for shortness of breath and wheezing.    Musculoskeletal: Positive for myalgias.   Gastrointestinal: Negative for abdominal pain and nausea.   Neurological: Negative for headaches.       Objective:      Physical Exam   Constitutional: She is oriented to person, place, and time. She appears well-developed and well-nourished. She is cooperative.  Non-toxic appearance. She does not appear ill. No distress.   Non productive cough present through out the exam       HENT:   Head: Normocephalic and atraumatic.   Right Ear: Hearing, tympanic membrane, external ear and ear canal normal.   Left Ear: Hearing, tympanic membrane, external ear and ear canal normal.   Nose: Rhinorrhea present. No mucosal edema or nasal deformity. No epistaxis. Right sinus exhibits no maxillary sinus tenderness and no frontal sinus tenderness. Left sinus exhibits no maxillary sinus tenderness and no frontal sinus " tenderness.   Mouth/Throat: Uvula is midline and mucous membranes are normal. No trismus in the jaw. Normal dentition. No uvula swelling. Posterior oropharyngeal erythema present.   Eyes: Conjunctivae and lids are normal. No scleral icterus.   Sclera clear bilat   Neck: Trachea normal, full passive range of motion without pain and phonation normal. Neck supple.   Cardiovascular: Normal rate, regular rhythm, normal heart sounds, intact distal pulses and normal pulses.    Pulmonary/Chest: Effort normal and breath sounds normal. No respiratory distress.   Abdominal: Soft. Normal appearance and bowel sounds are normal. She exhibits no distension. There is no tenderness.   Musculoskeletal: Normal range of motion. She exhibits no edema or deformity.   Neurological: She is alert and oriented to person, place, and time. She exhibits normal muscle tone. Coordination normal.   Skin: Skin is warm, dry and intact. She is not diaphoretic. No pallor.   Psychiatric: She has a normal mood and affect. Her speech is normal and behavior is normal. Judgment and thought content normal. Cognition and memory are normal.   Nursing note and vitals reviewed.      Assessment:       1. Viral URI with cough        Plan:         Viral URI with cough  -     POCT Influenza A/B  -     predniSONE (DELTASONE) 20 MG tablet; Take 1 tablet (20 mg total) by mouth once daily.  Dispense: 3 tablet; Refill: 0  -     promethazine-codeine 6.25-10 mg/5 ml (PHENERGAN WITH CODEINE) 6.25-10 mg/5 mL syrup; Take 5 mLs by mouth every 4 (four) hours as needed for Cough.  Dispense: 120 mL; Refill: 0

## 2018-01-09 NOTE — PATIENT INSTRUCTIONS
Please drink plenty of fluids.  Please get plenty of rest.  Please return here or go to the Emergency Department for any concerns or worsening of condition.  If you were prescribed antibiotics, please take them to completion.  If you were given wait & see antibiotics, please wait 5-7 days before taking them, and only take them if your symptoms have worsened or not improved.  If you do begin taking the antibiotics, please take them to completion.  If you were prescribed a narcotic medication, do not drive or operate heavy equipment or machinery while taking these medications.  If you were given a steroid shot in the clinic and have also been given a prescription for a steroid such as Prednisone or a Medrol Dose Pack, please begin taking them tomorrow.  If you do not have Hypertension or any history of palpitations, it is ok to take over the counter Sudafed or Mucinex D or Allegra-D or Claritin-D or Zyrtec-D.  If you do take one of the above, it is ok to combine that with plain over the counter Mucinex or Allegra or Claritin or Zyrtec.  If for example you are taking Zyrtec -D, you can combine that with Mucinex, but not Mucinex-D.  If you are taking Mucinex-D, you can combine that with plain Allegra or Claritin or Zyrtec.   If you do have Hypertension or palpitations, it is safe to take Coricidin HBP for relief of sinus symptoms.  If not allergic, please take over the counter Tylenol (Acetaminophen) and/or Motrin (Ibuprofen) as directed for control of pain and/or fever.  Please follow up with your primary care doctor or specialist as needed.    If you  smoke, please stop smoking.      Viral Upper Respiratory Illness (Adult)  You have a viral upper respiratory illness (URI), which is another term for the common cold. This illness is contagious during the first few days. It is spread through the air by coughing and sneezing. It may also be spread by direct contact (touching the sick person and then touching your own  eyes, nose, or mouth). Frequent handwashing will decrease risk of spread. Most viral illnesses go away within 7 to 10 days with rest and simple home remedies. Sometimes the illness may last for several weeks. Antibiotics will not kill a virus, and they are generally not prescribed for this condition.    Home care  · If symptoms are severe, rest at home for the first 2 to 3 days. When you resume activity, don't let yourself get too tired.  · Avoid being exposed to cigarette smoke (yours or others).  · You may use acetaminophen or ibuprofen to control pain and fever, unless another medicine was prescribed. (Note: If you have chronic liver or kidney disease, have ever had a stomach ulcer or gastrointestinal bleeding, or are taking blood-thinning medicines, talk with your healthcare provider before using these medicines.) Aspirin should never be given to anyone under 18 years of age who is ill with a viral infection or fever. It may cause severe liver or brain damage.  · Your appetite may be poor, so a light diet is fine. Avoid dehydration by drinking 6 to 8 glasses of fluids per day (water, soft drinks, juices, tea, or soup). Extra fluids will help loosen secretions in the nose and lungs.  · Over-the-counter cold medicines will not shorten the length of time youre sick, but they may be helpful for the following symptoms: cough, sore throat, and nasal and sinus congestion. (Note: Do not use decongestants if you have high blood pressure.)  Follow-up care  Follow up with your healthcare provider, or as advised.  When to seek medical advice  Call your healthcare provider right away if any of these occur:  · Cough with lots of colored sputum (mucus)  · Severe headache; face, neck, or ear pain  · Difficulty swallowing due to throat pain  · Fever of 100.4°F (38°C)  Call 911, or get immediate medical care  Call emergency services right away if any of these occur:  · Chest pain, shortness of breath, wheezing, or difficulty  breathing  · Coughing up blood  · Inability to swallow due to throat pain  Date Last Reviewed: 9/13/2015  © 6933-9958 [employer]. [address] [place] [address], [place] [address]. All rights reserved. This information is not intended as a substitute for professional medical care. Always follow your healthcare professional's instructions.

## 2018-01-19 ENCOUNTER — OFFICE VISIT (OUTPATIENT)
Dept: URGENT CARE | Facility: CLINIC | Age: 43
End: 2018-01-19
Payer: MEDICAID

## 2018-01-19 VITALS
OXYGEN SATURATION: 99 % | RESPIRATION RATE: 18 BRPM | HEIGHT: 64 IN | WEIGHT: 215 LBS | DIASTOLIC BLOOD PRESSURE: 82 MMHG | BODY MASS INDEX: 36.7 KG/M2 | TEMPERATURE: 97 F | HEART RATE: 84 BPM | SYSTOLIC BLOOD PRESSURE: 121 MMHG

## 2018-01-19 DIAGNOSIS — J20.9 ACUTE BRONCHITIS, UNSPECIFIED ORGANISM: Primary | ICD-10-CM

## 2018-01-19 DIAGNOSIS — R05.9 COUGH: ICD-10-CM

## 2018-01-19 LAB
CTP QC/QA: YES
FLUAV AG NPH QL: NEGATIVE
FLUBV AG NPH QL: NEGATIVE

## 2018-01-19 PROCEDURE — 87804 INFLUENZA ASSAY W/OPTIC: CPT | Mod: 59,QW,S$GLB, | Performed by: NURSE PRACTITIONER

## 2018-01-19 PROCEDURE — 99214 OFFICE O/P EST MOD 30 MIN: CPT | Mod: S$GLB,,, | Performed by: NURSE PRACTITIONER

## 2018-01-19 RX ORDER — PROMETHAZINE HYDROCHLORIDE AND CODEINE PHOSPHATE 6.25; 1 MG/5ML; MG/5ML
5 SOLUTION ORAL EVERY 4 HOURS PRN
Qty: 120 ML | Refills: 0 | Status: SHIPPED | OUTPATIENT
Start: 2018-01-19 | End: 2018-01-29

## 2018-01-19 RX ORDER — BENZONATATE 100 MG/1
200 CAPSULE ORAL EVERY 6 HOURS PRN
Qty: 40 CAPSULE | Refills: 1 | Status: SHIPPED | OUTPATIENT
Start: 2018-01-19 | End: 2018-02-02

## 2018-01-19 RX ORDER — FLUTICASONE PROPIONATE 50 MCG
2 SPRAY, SUSPENSION (ML) NASAL DAILY
Qty: 1 BOTTLE | Refills: 0 | Status: SHIPPED | OUTPATIENT
Start: 2018-01-19 | End: 2018-02-26

## 2018-01-19 NOTE — PROGRESS NOTES
"Subjective:       Patient ID: Caridad De La Garza is a 42 y.o. female.    Vitals:  height is 5' 4" (1.626 m) and weight is 97.5 kg (215 lb). Her tympanic temperature is 96.7 °F (35.9 °C). Her blood pressure is 121/82 and her pulse is 84. Her respiration is 18 and oxygen saturation is 99%.     Chief Complaint: Cough    Pt c/o continued cough that is mainly non productive.  No longer with nasal congestion.  Pt states she is actually starting to feel better but cough lingers.  No fever.        Cough   This is a recurrent problem. The current episode started 1 to 4 weeks ago (Approx 2 weeks). The problem has been gradually improving. The problem occurs every few minutes. The cough is productive of sputum. Associated symptoms include myalgias. Pertinent negatives include no chest pain, chills, ear congestion, ear pain, eye redness, fever, headaches, nasal congestion, postnasal drip, rhinorrhea, sore throat, shortness of breath, sweats or wheezing. Nothing aggravates the symptoms. She has tried OTC cough suppressant for the symptoms. Her past medical history is significant for environmental allergies.     Review of Systems   Constitution: Positive for malaise/fatigue. Negative for chills and fever.   HENT: Positive for congestion. Negative for ear pain, hoarse voice, postnasal drip, rhinorrhea and sore throat.    Eyes: Negative for discharge and redness.   Cardiovascular: Negative for chest pain, dyspnea on exertion and leg swelling.   Respiratory: Positive for cough and sputum production. Negative for shortness of breath and wheezing.    Musculoskeletal: Positive for myalgias.   Gastrointestinal: Negative for abdominal pain and nausea.   Neurological: Negative for headaches.   Allergic/Immunologic: Positive for environmental allergies.       Objective:      Physical Exam   Constitutional: She is oriented to person, place, and time. She appears well-developed and well-nourished. She is cooperative.  Non-toxic appearance. " She does not have a sickly appearance. She does not appear ill. No distress.   HENT:   Head: Normocephalic and atraumatic.   Right Ear: Hearing, tympanic membrane, external ear and ear canal normal.   Left Ear: Hearing, tympanic membrane, external ear and ear canal normal.   Nose: Mucosal edema present. No rhinorrhea or nasal deformity. No epistaxis. Right sinus exhibits no maxillary sinus tenderness and no frontal sinus tenderness. Left sinus exhibits no maxillary sinus tenderness and no frontal sinus tenderness.   Mouth/Throat: Uvula is midline, oropharynx is clear and moist and mucous membranes are normal. No trismus in the jaw. Normal dentition. No uvula swelling. No oropharyngeal exudate, posterior oropharyngeal edema or posterior oropharyngeal erythema. Tonsils are 1+ on the right. Tonsils are 1+ on the left. No tonsillar exudate.   Eyes: Conjunctivae and lids are normal. No scleral icterus.   Sclera clear bilat   Neck: Trachea normal, full passive range of motion without pain and phonation normal. Neck supple.   Cardiovascular: Normal rate, regular rhythm, normal heart sounds and normal pulses.    Pulmonary/Chest: Effort normal and breath sounds normal. No respiratory distress. She has no wheezes.   Non productive cough present through out exam.   Abdominal: Soft. Normal appearance and bowel sounds are normal. She exhibits no distension. There is no tenderness.   Musculoskeletal: Normal range of motion. She exhibits no edema or deformity.   Lymphadenopathy:     She has no cervical adenopathy.   Neurological: She is alert and oriented to person, place, and time. She exhibits normal muscle tone. Coordination normal.   Skin: Skin is warm, dry and intact. She is not diaphoretic. No pallor.   Psychiatric: She has a normal mood and affect. Her speech is normal and behavior is normal. Judgment and thought content normal. Cognition and memory are normal.   Nursing note and vitals reviewed.      Results for orders  "placed or performed in visit on 01/19/18   POCT Influenza A/B   Result Value Ref Range    Rapid Influenza A Ag Negative Negative    Rapid Influenza B Ag Negative Negative     Acceptable Yes      Assessment:       1. Acute bronchitis, unspecified organism    2. Cough        Plan:         Acute bronchitis, unspecified organism  -     benzonatate (TESSALON PERLES) 100 MG capsule; Take 2 capsules (200 mg total) by mouth every 6 (six) hours as needed for Cough.  Dispense: 40 capsule; Refill: 1  -     fluticasone (FLONASE) 50 mcg/actuation nasal spray; 2 sprays (100 mcg total) by Each Nare route once daily.  Dispense: 1 Bottle; Refill: 0  -     promethazine-codeine 6.25-10 mg/5 ml (PHENERGAN WITH CODEINE) 6.25-10 mg/5 mL syrup; Take 5 mLs by mouth every 4 (four) hours as needed for Cough.  Dispense: 120 mL; Refill: 0    Cough  -     POCT Influenza A/B      Patient Instructions                                                If your condition worsens or fails to improve we recommend that you receive another evaluation at the ER immediately or contact your PCP to discuss your concerns or return here. You must understand that you've received an urgent care treatment only and that you may be released before all your medical problems are known or treated. You the patient will arrange for follouwp care as instructed.   Flonase (fluticasone) is a nasal spray which is available over the counter and may help with your symptoms.   Zyrtec D, Claritin D or Allegra D can also help with symptoms of congestion and drainage.   If you have hypertension avoid using the "D" which is the decongestant   If you just have drainage you can take plain zyrtec, claritin or allegra   If you just have a congested feeling you can take pseudoephedrine (unless you have high blood pressure) which you have to sign for behind the counter. Do not buy the phenylephrine which is on the shelf as it is not effective   Rest and fluids are also " important.   Tylenol or ibuprofen can also be used as directed for pain unless you have an allergy to them or medical condition such as stomach ulcers, kidney or liver disease or blood thinners etc for which you should not be taking these type of medications.   If you are flying in the next few days Afrin nose drops for the airplane flight upon take off and landing may help. Other than at those times refrain from using afrin.   If you were prescribed a narcotic do not drive or operate heavy machinery while taking these medications.       Bronchitis, Viral (Adult)    You have a viral bronchitis. Bronchitis is inflammation and swelling of the lining of the lungs. This is often caused by an infection. Symptoms include a dry, hacking cough that is worse at night. The cough may bring up yellow-green mucus. You may also feel short of breath or wheeze. Other symptoms may include tiredness, chest discomfort, and chills.  Bronchitis that is caused by a virus is not treated with antibiotics. Instead, medicines may be given to help relieve symptoms. Symptoms can last up to 2 weeks, although the cough may last much longer.  This illness is contagious during the first few days and is spread through the air by coughing and sneezing, or by direct contact (touching the sick person and then touching your own eyes, nose, or mouth).  Most viral illnesses resolve within 10 to 14 days with rest and simple home remedies, although they may sometimes last for several weeks.  Home care  · If symptoms are severe, rest at home for the first 2 to 3 days. When you go back to your usual activities, don't let yourself get too tired.  · Do not smoke. Also avoid being exposed to secondhand smoke.  · You may use over-the-counter medicine to control fever or pain, unless another pain medicine was prescribed. (Note: If you have chronic liver or kidney disease or have ever had a stomach ulcer or gastrointestinal bleeding, talk with your healthcare  provider before using these medicines. Also talk to your provider if you are taking medicine to prevent blood clots.) Aspirin should never be given to anyone younger than 18 years of age who is ill with a viral infection or fever. It may cause severe liver or brain damage.  · Your appetite may be poor, so a light diet is fine. Avoid dehydration by drinking 6 to 8 glasses of fluids per day (such as water, soft drinks, sports drinks, juices, tea, or soup). Extra fluids will help loosen secretions in the nose and lungs.  · Over-the-counter cough, cold, and sore-throat medicines will not shorten the length of the illness, but they may help to reduce symptoms. (Note: Do not use decongestants if you have high blood pressure.)  Follow-up care  Follow up with your healthcare provider, or as advised. If you had an X-ray or ECG (electrocardiogram), a specialist will review it. You will be notified of any new findings that may affect your care.  Note: If you are age 65 or older, or if you have a chronic lung disease or condition that affects your immune system, or you smoke, talk to your healthcare provider about having pneumococcal vaccinations and a yearly influenza vaccination (flu shot).  When to seek medical advice  Call your healthcare provider right away if any of these occur:  · Fever of 100.4°F (38°C) or higher  · Coughing up increased amounts of colored sputum  · Weakness, drowsiness, headache, facial pain, ear pain, or a stiff neck  Call 911, or get immediate medical care  Contact emergency services right away if any of these occur:  · Coughing up blood  · Worsening weakness, drowsiness, headache, or stiff neck  · Trouble breathing, wheezing, or pain with breathing  Date Last Reviewed: 9/13/2015 © 2000-2017 [employer]. [address] [place] [address], [place] [address]. All rights reserved. This information is not intended as a substitute for professional medical care. Always follow your healthcare  professional's instructions.

## 2018-01-19 NOTE — PATIENT INSTRUCTIONS
"                                             If your condition worsens or fails to improve we recommend that you receive another evaluation at the ER immediately or contact your PCP to discuss your concerns or return here. You must understand that you've received an urgent care treatment only and that you may be released before all your medical problems are known or treated. You the patient will arrange for follouwp care as instructed.   Flonase (fluticasone) is a nasal spray which is available over the counter and may help with your symptoms.   Zyrtec D, Claritin D or Allegra D can also help with symptoms of congestion and drainage.   If you have hypertension avoid using the "D" which is the decongestant   If you just have drainage you can take plain zyrtec, claritin or allegra   If you just have a congested feeling you can take pseudoephedrine (unless you have high blood pressure) which you have to sign for behind the counter. Do not buy the phenylephrine which is on the shelf as it is not effective   Rest and fluids are also important.   Tylenol or ibuprofen can also be used as directed for pain unless you have an allergy to them or medical condition such as stomach ulcers, kidney or liver disease or blood thinners etc for which you should not be taking these type of medications.   If you are flying in the next few days Afrin nose drops for the airplane flight upon take off and landing may help. Other than at those times refrain from using afrin.   If you were prescribed a narcotic do not drive or operate heavy machinery while taking these medications.       Bronchitis, Viral (Adult)    You have a viral bronchitis. Bronchitis is inflammation and swelling of the lining of the lungs. This is often caused by an infection. Symptoms include a dry, hacking cough that is worse at night. The cough may bring up yellow-green mucus. You may also feel short of breath or wheeze. Other symptoms may include tiredness, chest " discomfort, and chills.  Bronchitis that is caused by a virus is not treated with antibiotics. Instead, medicines may be given to help relieve symptoms. Symptoms can last up to 2 weeks, although the cough may last much longer.  This illness is contagious during the first few days and is spread through the air by coughing and sneezing, or by direct contact (touching the sick person and then touching your own eyes, nose, or mouth).  Most viral illnesses resolve within 10 to 14 days with rest and simple home remedies, although they may sometimes last for several weeks.  Home care  · If symptoms are severe, rest at home for the first 2 to 3 days. When you go back to your usual activities, don't let yourself get too tired.  · Do not smoke. Also avoid being exposed to secondhand smoke.  · You may use over-the-counter medicine to control fever or pain, unless another pain medicine was prescribed. (Note: If you have chronic liver or kidney disease or have ever had a stomach ulcer or gastrointestinal bleeding, talk with your healthcare provider before using these medicines. Also talk to your provider if you are taking medicine to prevent blood clots.) Aspirin should never be given to anyone younger than 18 years of age who is ill with a viral infection or fever. It may cause severe liver or brain damage.  · Your appetite may be poor, so a light diet is fine. Avoid dehydration by drinking 6 to 8 glasses of fluids per day (such as water, soft drinks, sports drinks, juices, tea, or soup). Extra fluids will help loosen secretions in the nose and lungs.  · Over-the-counter cough, cold, and sore-throat medicines will not shorten the length of the illness, but they may help to reduce symptoms. (Note: Do not use decongestants if you have high blood pressure.)  Follow-up care  Follow up with your healthcare provider, or as advised. If you had an X-ray or ECG (electrocardiogram), a specialist will review it. You will be notified of  any new findings that may affect your care.  Note: If you are age 65 or older, or if you have a chronic lung disease or condition that affects your immune system, or you smoke, talk to your healthcare provider about having pneumococcal vaccinations and a yearly influenza vaccination (flu shot).  When to seek medical advice  Call your healthcare provider right away if any of these occur:  · Fever of 100.4°F (38°C) or higher  · Coughing up increased amounts of colored sputum  · Weakness, drowsiness, headache, facial pain, ear pain, or a stiff neck  Call 911, or get immediate medical care  Contact emergency services right away if any of these occur:  · Coughing up blood  · Worsening weakness, drowsiness, headache, or stiff neck  · Trouble breathing, wheezing, or pain with breathing  Date Last Reviewed: 9/13/2015  © 8882-5993 [employer]. [address] [place] [address]. All rights reserved. This information is not intended as a substitute for professional medical care. Always follow your healthcare professional's instructions.

## 2018-01-31 ENCOUNTER — TELEPHONE (OUTPATIENT)
Dept: OBSTETRICS AND GYNECOLOGY | Facility: CLINIC | Age: 43
End: 2018-01-31

## 2018-01-31 ENCOUNTER — TELEPHONE (OUTPATIENT)
Dept: UROGYNECOLOGY | Facility: CLINIC | Age: 43
End: 2018-01-31

## 2018-01-31 DIAGNOSIS — R32 URINARY INCONTINENCE, UNSPECIFIED TYPE: Primary | ICD-10-CM

## 2018-01-31 NOTE — TELEPHONE ENCOUNTER
----- Message from Sanaz Rudolph sent at 1/31/2018 12:03 PM CST -----  Patient has a referral to Urogyn, can you please help me and schedule patient, thank you

## 2018-01-31 NOTE — TELEPHONE ENCOUNTER
----- Message from Zander Muñoz sent at 1/31/2018  8:43 AM CST -----  Contact: self/[phone]  She just had a procedure; but she is calling to see how soon she can have a second procedure.

## 2018-01-31 NOTE — TELEPHONE ENCOUNTER
Left message for patient to return call.   Patient needs contact number for urogynecology, referral has been placed [phone]

## 2018-01-31 NOTE — TELEPHONE ENCOUNTER
Returned patients call.   Patient is calling reporting episodes of urinary leakage while coughing. Patient states she has not tried any methods to help with this and would like a surgical intervention for this. Verbalized understanding. Dr. Patel was verbally consulted and recommends the patient follows up with a uro-gynecologist for this.   Verbalized understanding

## 2018-02-26 ENCOUNTER — OFFICE VISIT (OUTPATIENT)
Dept: FAMILY MEDICINE | Facility: HOSPITAL | Age: 43
End: 2018-02-26
Attending: FAMILY MEDICINE
Payer: MEDICAID

## 2018-02-26 VITALS
BODY MASS INDEX: 34.63 KG/M2 | SYSTOLIC BLOOD PRESSURE: 138 MMHG | DIASTOLIC BLOOD PRESSURE: 80 MMHG | HEIGHT: 65 IN | WEIGHT: 207.88 LBS | HEART RATE: 99 BPM

## 2018-02-26 DIAGNOSIS — J30.2 SEASONAL ALLERGIC RHINITIS, UNSPECIFIED CHRONICITY, UNSPECIFIED TRIGGER: Primary | ICD-10-CM

## 2018-02-26 DIAGNOSIS — K21.9 GASTROESOPHAGEAL REFLUX DISEASE WITHOUT ESOPHAGITIS: ICD-10-CM

## 2018-02-26 DIAGNOSIS — G56.03 BILATERAL CARPAL TUNNEL SYNDROME: ICD-10-CM

## 2018-02-26 PROCEDURE — 99214 OFFICE O/P EST MOD 30 MIN: CPT | Performed by: FAMILY MEDICINE

## 2018-02-26 RX ORDER — AZITHROMYCIN 250 MG/1
TABLET, FILM COATED ORAL
Refills: 0 | COMMUNITY
Start: 2018-02-16 | End: 2018-02-26

## 2018-02-26 RX ORDER — PROMETHAZINE HYDROCHLORIDE AND PHENYLEPHRINE HYDROCHLORIDE 6.25; 5 MG/5ML; MG/5ML
SYRUP ORAL
Refills: 0 | COMMUNITY
Start: 2018-02-16 | End: 2018-02-26

## 2018-02-26 RX ORDER — CETIRIZINE HYDROCHLORIDE 5 MG/1
5 TABLET, CHEWABLE ORAL DAILY
Qty: 30 TABLET | Refills: 11 | Status: SHIPPED | OUTPATIENT
Start: 2018-02-26 | End: 2018-10-10

## 2018-02-26 RX ORDER — AZELASTINE 1 MG/ML
SPRAY, METERED NASAL
Refills: 0 | COMMUNITY
Start: 2018-02-16 | End: 2019-08-22 | Stop reason: ALTCHOICE

## 2018-02-26 RX ORDER — HYDROGEN PEROXIDE 3 %
20 SOLUTION, NON-ORAL MISCELLANEOUS
Qty: 30 CAPSULE | Refills: 11 | Status: SHIPPED | OUTPATIENT
Start: 2018-02-26 | End: 2018-02-28

## 2018-02-26 RX ORDER — CETIRIZINE HYDROCHLORIDE 10 MG/1
TABLET ORAL
Refills: 0 | COMMUNITY
Start: 2018-02-16 | End: 2019-08-22 | Stop reason: ALTCHOICE

## 2018-02-26 NOTE — PROGRESS NOTES
Subjective:       Patient ID: Caridad De La Garza is a 42 y.o. female.    Chief Complaint: Carpal Tunnel and Gastroesophageal Reflux    HPI   Patient is a 42 year old female with a history of GERD, allergies and bilateral carpal tunnel presenting to clinic for refills of her acid reflux meds and zyrtec.  Patient reports she substituted her Nexium for pepcid and her symptoms were not well controlled.  She was recently evaluated by an allergist as well and was treated for bronchitis with a Zpak and was started on Affrin nasal spray until symptomatic improvement.    Of note patient also reports bilateral wrist numbness and tingling that has been awakening her at night.  Her symptoms had resolved for several months after her baby had started to become more independent.  States the recent flare has been disturbing her sleep.  Reports previously symptomac relief with wrist braces.  Patient reports she is currently enrolled in school and has been typing often which also exacerbates her wrist pain.    Review of Systems   Constitutional: Negative for diaphoresis, fatigue and fever.   HENT: Negative for congestion.         GERD,  Allergies   Eyes: Negative for discharge and visual disturbance.   Respiratory: Negative for cough, shortness of breath and wheezing.    Cardiovascular: Negative for chest pain, palpitations and leg swelling.   Gastrointestinal: Negative for abdominal distention, abdominal pain, constipation, diarrhea, nausea and vomiting.   Endocrine: Negative for cold intolerance and heat intolerance.   Genitourinary: Negative for dysuria and flank pain.   Musculoskeletal: Negative for arthralgias and myalgias.   Skin: Negative for color change, pallor and rash.   Neurological: Negative for weakness, numbness and headaches.        Bilateral wrist and arm numbness and tingling   Hematological: Negative for adenopathy.   Psychiatric/Behavioral: Negative for agitation, behavioral problems and hallucinations. The  patient is not nervous/anxious.        Objective:      Vitals:    02/26/18 1121   BP: 138/80   Pulse: 99     Physical Exam   Constitutional: She is oriented to person, place, and time. She appears well-developed and well-nourished.   HENT:   Head: Normocephalic and atraumatic.   Eyes: EOM are normal. Pupils are equal, round, and reactive to light.   Neck: Normal range of motion.   Cardiovascular: Normal rate and regular rhythm.    Pulmonary/Chest: Effort normal and breath sounds normal.   Abdominal: Soft. Bowel sounds are normal. She exhibits no distension. There is no tenderness.   Musculoskeletal: Normal range of motion. She exhibits no edema.   Neurological: She is alert and oriented to person, place, and time.   +flick test, +phalens, -Tinnels.  Decreased  strength bilaterally       Assessment:       1. Seasonal allergic rhinitis, unspecified chronicity, unspecified trigger    2. Gastroesophageal reflux disease without esophagitis    3. Bilateral carpal tunnel syndrome        Plan:       Seasonal allergic rhinitis, unspecified chronicity, unspecified trigger  -     cetirizine (ZYRTEC) 5 MG chewable tablet; Take 1 tablet (5 mg total) by mouth once daily.  Dispense: 30 tablet; Refill: 11    Gastroesophageal reflux disease without esophagitis  -     esomeprazole (NEXIUM) 20 MG capsule; Take 1 capsule (20 mg total) by mouth before breakfast.  Dispense: 30 capsule; Refill: 11    Bilateral carpal tunnel syndrome         -    Encouraged wearing wrist brace, and keyboard wrist support.  NSAIDs with caution due to history of GERD and Asper cream with lidocaine.    Follow-up in about 1 year (around 2/26/2019).      Abdifatah Auguste MD  \[facility] [place] [facility]\"" Family Medicine,  3  2/26/2018  12:20 PM

## 2018-02-27 ENCOUNTER — TELEPHONE (OUTPATIENT)
Dept: FAMILY MEDICINE | Facility: HOSPITAL | Age: 43
End: 2018-02-27

## 2018-02-27 NOTE — TELEPHONE ENCOUNTER
----- Message from Mirna Naik sent at 2/27/2018 10:28 AM CST -----  PT CALLED TO SEE IF THERE WAS A AUTH FOR HEResomeprazole (NEXIUM) 20 MG capsule, R IF THE DR GOING TO CHANGE IT TO SOMETHING HER INS WILL PAY FOR

## 2018-02-28 RX ORDER — PANTOPRAZOLE SODIUM 20 MG/1
20 TABLET, DELAYED RELEASE ORAL DAILY
Qty: 30 TABLET | Refills: 11 | Status: SHIPPED | OUTPATIENT
Start: 2018-02-28 | End: 2019-03-27 | Stop reason: SDUPTHER

## 2018-03-10 DIAGNOSIS — J30.2 SEASONAL ALLERGIC RHINITIS: ICD-10-CM

## 2018-03-10 RX ORDER — FLUTICASONE PROPIONATE 50 UG/1
SPRAY, METERED NASAL
Qty: 9.9 BOTTLE | Refills: 5 | Status: SHIPPED | OUTPATIENT
Start: 2018-03-10 | End: 2020-08-31 | Stop reason: SDUPTHER

## 2018-03-11 DIAGNOSIS — J30.2 SEASONAL ALLERGIC RHINITIS: ICD-10-CM

## 2018-03-11 RX ORDER — TRIAMCINOLONE ACETONIDE 55 UG/1
SPRAY, METERED NASAL
Qty: 16.9 G | Refills: 0 | Status: SHIPPED | OUTPATIENT
Start: 2018-03-11 | End: 2020-08-31

## 2018-04-05 ENCOUNTER — OFFICE VISIT (OUTPATIENT)
Dept: URGENT CARE | Facility: CLINIC | Age: 43
End: 2018-04-05
Payer: MEDICAID

## 2018-04-05 VITALS
RESPIRATION RATE: 16 BRPM | WEIGHT: 210 LBS | BODY MASS INDEX: 34.99 KG/M2 | HEIGHT: 65 IN | OXYGEN SATURATION: 97 % | TEMPERATURE: 98 F | DIASTOLIC BLOOD PRESSURE: 91 MMHG | SYSTOLIC BLOOD PRESSURE: 133 MMHG | HEART RATE: 88 BPM

## 2018-04-05 DIAGNOSIS — J06.9 UPPER RESPIRATORY TRACT INFECTION, UNSPECIFIED TYPE: Primary | ICD-10-CM

## 2018-04-05 PROCEDURE — 99214 OFFICE O/P EST MOD 30 MIN: CPT | Mod: S$GLB,,, | Performed by: PHYSICIAN ASSISTANT

## 2018-04-05 RX ORDER — AZITHROMYCIN 250 MG/1
TABLET, FILM COATED ORAL
Qty: 6 TABLET | Refills: 0 | Status: SHIPPED | OUTPATIENT
Start: 2018-04-05 | End: 2018-04-10

## 2018-04-05 RX ORDER — PROMETHAZINE HYDROCHLORIDE 6.25 MG/5ML
6.25 SYRUP ORAL 4 TIMES DAILY PRN
Qty: 120 ML | Refills: 1 | Status: SHIPPED | OUTPATIENT
Start: 2018-04-05 | End: 2018-10-10

## 2018-04-05 RX ORDER — BETAMETHASONE SODIUM PHOSPHATE AND BETAMETHASONE ACETATE 3; 3 MG/ML; MG/ML
6 INJECTION, SUSPENSION INTRA-ARTICULAR; INTRALESIONAL; INTRAMUSCULAR; SOFT TISSUE
Status: COMPLETED | OUTPATIENT
Start: 2018-04-05 | End: 2018-04-05

## 2018-04-05 RX ADMIN — BETAMETHASONE SODIUM PHOSPHATE AND BETAMETHASONE ACETATE 6 MG: 3; 3 INJECTION, SUSPENSION INTRA-ARTICULAR; INTRALESIONAL; INTRAMUSCULAR; SOFT TISSUE at 12:04

## 2018-04-05 NOTE — PATIENT INSTRUCTIONS
Viral Upper Respiratory Illness (Adult)  You have a viral upper respiratory illness (URI), which is another term for the common cold. This illness is contagious during the first few days. It is spread through the air by coughing and sneezing. It may also be spread by direct contact (touching the sick person and then touching your own eyes, nose, or mouth). Frequent handwashing will decrease risk of spread. Most viral illnesses go away within 7 to 10 days with rest and simple home remedies. Sometimes the illness may last for several weeks. Antibiotics will not kill a virus, and they are generally not prescribed for this condition.    Home care  · If symptoms are severe, rest at home for the first 2 to 3 days. When you resume activity, don't let yourself get too tired.  · Avoid being exposed to cigarette smoke (yours or others).  · You may use acetaminophen or ibuprofen to control pain and fever, unless another medicine was prescribed. (Note: If you have chronic liver or kidney disease, have ever had a stomach ulcer or gastrointestinal bleeding, or are taking blood-thinning medicines, talk with your healthcare provider before using these medicines.) Aspirin should never be given to anyone under 18 years of age who is ill with a viral infection or fever. It may cause severe liver or brain damage.  · Your appetite may be poor, so a light diet is fine. Avoid dehydration by drinking 6 to 8 glasses of fluids per day (water, soft drinks, juices, tea, or soup). Extra fluids will help loosen secretions in the nose and lungs.  · Over-the-counter cold medicines will not shorten the length of time youre sick, but they may be helpful for the following symptoms: cough, sore throat, and nasal and sinus congestion. (Note: Do not use decongestants if you have high blood pressure.)  Follow-up care  Follow up with your healthcare provider, or as advised.  When to seek medical advice  Call your healthcare provider right away if any  of these occur:  · Cough with lots of colored sputum (mucus)  · Severe headache; face, neck, or ear pain  · Difficulty swallowing due to throat pain  · Fever of 100.4°F (38°C)  Call 911, or get immediate medical care  Call emergency services right away if any of these occur:  · Chest pain, shortness of breath, wheezing, or difficulty breathing  · Coughing up blood  · Inability to swallow due to throat pain  Date Last Reviewed: 9/13/2015 © 2000-2017 [employer]. [address] [place] [id number]. All rights reserved. This information is not intended as a substitute for professional medical care. Always follow your healthcare professional's instructions.        What Is Acute Bronchitis?  Acute bronchitis is when the airways in your lungs (bronchial tubes) become red and swollen (inflamed). It is usually caused by a viral infection. But it can also occur because of a bacteria or allergen. Symptoms include a cough that produces yellow or greenish mucus and can last for days or sometimes weeks.  Inside healthy lungs    Air travels in and out of the lungs through the airways. The linings of these airways produce sticky mucus. This mucus traps particles that enter the lungs. Tiny structures called cilia then sweep the particles out of the airways.     Healthy airway: Airways are normally open. Air moves in and out easily.      Healthy cilia: Tiny, hairlike cilia sweep mucus and particles up and out of the airways.   Lungs with bronchitis  Bronchitis often occurs with a cold or the flu virus. The airways become inflamed (red and swollen). There is a deep hacking cough from the extra mucus. Other symptoms may include:  · Wheezing  · Chest discomfort  · Shortness of breath  · Mild fever  A second infection, this time due to bacteria, may then occur. And airways irritated by allergens or smoke are more likely to get infected.        Inflamed airway: Inflammation and extra mucus narrow the airway, causing  shortness of breath.      Impaired cilia: Extra mucus impairs cilia, causing congestion and wheezing. Smoking makes the problem worse.   Making a diagnosis  A physical exam, health history, and certain tests help your healthcare provider make the diagnosis.  Health history  Your healthcare provider will ask you about your symptoms.  The exam  Your provider listens to your chest for signs of congestion. He or she may also check your ears, nose, and throat.  Possible tests  · A sputum test for bacteria. This requires a sample of mucus from your lungs.  · A nasal or throat swab. This tests to see if you have a bacterial infection.  · A chest X-ray. This is done if your healthcare provider thinks you have pneumonia.  · Tests to check for an underlying condition. Other tests may be done to check for things such as allergies, asthma, or COPD (chronic obstructive pulmonary disease). You may need to see a specialist for more lung function testing.  Treating a cough  The main treatment for bronchitis is easing symptoms. Avoiding smoke, allergens, and other things that trigger coughing can often help. If the infection is bacterial, you may be given antibiotics. During the illness, it's important to get plenty of sleep. To ease symptoms:  · Dont smoke. Also avoid secondhand smoke.  · Use a humidifier. Or try breathing in steam from a hot shower. This may help loosen mucus.  · Drink a lot of water and juice. They can soothe the throat and may help thin mucus.  · Sit up or use extra pillows when in bed. This helps to lessen coughing and congestion.  · Ask your provider about using medicine. Ask about using cough medicine, pain and fever medicine, or a decongestant.  Antibiotics  Most cases of bronchitis are caused by cold or flu viruses. They dont need antibiotics to treat them, even if your mucus is thick and green or yellow. Antibiotics dont treat viral illness and antibiotics have not been shown to have any benefit in  cases of acute bronchitis. Taking antibiotics when they are not needed increases your risk of getting an infection later that is antibiotic-resistant. Antibiotics can also cause severe cases of diarrhea that require other antibiotics to treat.  It is important that you accept your healthcare provider's opinion to not use antibiotics. Your provider will prescribe antibiotics if the infection is caused by bacteria. If they are prescribed:  · Take all of the medicine. Take the medicine until it is used up, even if symptoms have improved. If you dont, the bronchitis may come back.  · Take the medicines as directed. For instance, some medicines should be taken with food.  · Ask about side effects. Ask your provider or pharmacist what side effects are common, and what to do about them.  Follow-up care  You should see your provider again in 2 to 3 weeks. By this time, symptoms should have improved. An infection that lasts longer may mean you have a more serious problem.  Prevention  · Avoid tobacco smoke. If you smoke, quit. Stay away from smoky places. Ask friends and family not to smoke around you, or in your home or car.  · Get checked for allergies.  · Ask your provider about getting a yearly flu shot. Also ask about pneumococcal or pneumonia shots.  · Wash your hands often. This helps reduce the chance of picking up viruses that cause colds and flu.  Call your healthcare provider if:  · Symptoms worsen, or you have new symptoms  · Breathing problems worsen or  become severe  · Symptoms dont get better within a week, or within 3 days of taking antibiotics   Date Last Reviewed: 2/1/2017  © 6741-6596 [employer]. [address] [place] [address], [place] [address]. All rights reserved. This information is not intended as a substitute for professional medical care. Always follow your healthcare professional's instructions.      Please drink plenty of fluids.  Please get plenty of rest.  Please return here or go to  the Emergency Department for any concerns or worsening of condition.  If you were given wait & see antibiotics, please wait 3-5 days before taking them, and only take them if your symptoms have worsened or not improved.  If you do begin taking the antibiotics, please take them to completion.  If you were prescribed antibiotics, please take them to completion.  If you were prescribed a narcotic medication, do not drive or operate heavy equipment or machinery while taking these medications.  If you do not have Hypertension or any history of palpitations, it is ok to take over the counter Sudafed or Mucinex D or Allegra-D or Claritin-D or Zyrtec-D.  If you do take one of the above, it is ok to combine that with plain over the counter Mucinex or Allegra or Claritin or Zyrtec.  If for example you are taking Zyrtec -D, you can combine that with Mucinex, but not Mucinex-D.  If you are taking Mucinex-D, you can combine that with plain Allegra or Claritin or Zyrtec.   If you do have Hypertension or palpitations, it is safe to take Coricidin HBP for relief of sinus symptoms.  We recommend you take over the counter Flonase (Fluticasone) or another nasally inhaled steroid unless you are already taking one.  Nasal irrigation with a saline spray or Netti Pot like device per their directions is also recommended.  If not allergic, please take over the counter Tylenol (Acetaminophen) and/or Motrin (Ibuprofen) as directed for control of pain and/or fever.  Please follow up with your primary care doctor or specialist as needed.    If you  smoke, please stop smoking.

## 2018-04-05 NOTE — PROGRESS NOTES
"Subjective:       Patient ID: Caridad De La Garza is a 42 y.o. female.    Vitals:  height is 5' 4.5" (1.638 m) and weight is 95.3 kg (210 lb). Her temperature is 97.8 °F (36.6 °C). Her blood pressure is 133/91 (abnormal) and her pulse is 88. Her respiration is 16 and oxygen saturation is 97%.     Chief Complaint: Cough and Sinus Problem    The patient is a 42 y.o. female with a history of a cough and nasal congestion of 7 days duration. The problem is described as moderate. The cough is productive. It persists with sleep. Associated signs and symptoms include: runny nose, sneezing and congestion.     The pt does not have a history of asthma.  The pt does not have a history of AR  The pt does not have a history of eczema.    The patient has been treated with the following: zyrtec, azelastine hcl .     Patient had bronchitis one month ago. Patient was traveling in [place] last week in between [facility] and [place]. Denies sick contacts.    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 The current episode started in the past 7 days. The problem has been unchanged. The problem occurs every few minutes. The cough is productive of sputum. Associated symptoms include ear congestion, headaches and postnasal drip. Pertinent negatives include no chest pain, chills, ear pain, eye redness, fever, myalgias, sore throat, shortness of breath or wheezing. Nothing aggravates the symptoms. She has tried nothing for the symptoms. Her past medical history is significant for bronchitis.   Sinus Problem   This is a new problem. The current episode started in the past 7 days. The problem is unchanged. There has been no fever (UNMEASURED). Associated symptoms include congestion, coughing, headaches, a hoarse voice, sinus pressure and sneezing. Pertinent negatives include no chills, ear pain, shortness of breath or sore throat. "     Review of Systems   Constitution: Negative for chills, fever and malaise/fatigue.   HENT: Positive for congestion, hoarse voice, postnasal drip, sinus pressure and sneezing. Negative for ear pain and sore throat.    Eyes: Negative for discharge and redness.   Cardiovascular: Negative for chest pain, dyspnea on exertion and leg swelling.   Respiratory: Positive for cough and sputum production. Negative for shortness of breath and wheezing.    Musculoskeletal: Negative for myalgias.   Gastrointestinal: Negative for abdominal pain and nausea.   Neurological: Positive for headaches.       Objective:      Physical Exam   Constitutional: She is oriented to person, place, and time. She appears well-developed and well-nourished. She is cooperative.  Non-toxic appearance. She does not appear ill. No distress.   HENT:   Head: Normocephalic and atraumatic.   Right Ear: Hearing, external ear and ear canal normal. Tympanic membrane is not erythematous. A middle ear effusion (serous) is present.   Left Ear: Hearing, tympanic membrane, external ear and ear canal normal. Tympanic membrane is not erythematous.  No middle ear effusion.   Nose: Nose normal. No mucosal edema, rhinorrhea or nasal deformity. No epistaxis. Right sinus exhibits no maxillary sinus tenderness and no frontal sinus tenderness. Left sinus exhibits no maxillary sinus tenderness and no frontal sinus tenderness.   Mouth/Throat: Uvula is midline and mucous membranes are normal. No trismus in the jaw. Normal dentition. No uvula swelling. Posterior oropharyngeal erythema present. Tonsils are 3+ on the right. Tonsils are 3+ on the left.   Eyes: Conjunctivae and lids are normal. No scleral icterus.   Sclera clear bilat   Neck: Trachea normal, full passive range of motion without pain and phonation normal. Neck supple.   Cardiovascular: Normal rate, regular rhythm, normal heart sounds, intact distal pulses and normal pulses.    Pulmonary/Chest: Effort normal and  breath sounds normal. No accessory muscle usage. No respiratory distress. She exhibits no tenderness.   Abdominal: Soft. Normal appearance and bowel sounds are normal. She exhibits no distension. There is no tenderness.   Musculoskeletal: Normal range of motion. She exhibits no edema or deformity.   Neurological: She is alert and oriented to person, place, and time. She exhibits normal muscle tone. Coordination normal.   Skin: Skin is warm, dry and intact. She is not diaphoretic. No pallor.   Psychiatric: She has a normal mood and affect. Her speech is normal and behavior is normal. Judgment and thought content normal. Cognition and memory are normal.   Nursing note and vitals reviewed.      Assessment:       1. Upper respiratory tract infection, unspecified type        Plan:         Upper respiratory tract infection, unspecified type  -     betamethasone acetate-betamethasone sodium phosphate injection 6 mg; Inject 1 mL (6 mg total) into the muscle one time.  -     azithromycin (Z-JESSE) 250 MG tablet; Take 2 tablets by mouth on day 1; Take 1 tablet by mouth on days 2-5  Dispense: 6 tablet; Refill: 0  -     promethazine (PHENERGAN) 6.25 mg/5 mL syrup; Take 5 mLs (6.25 mg total) by mouth 4 (four) times daily as needed for Nausea.  Dispense: 120 mL; Refill: 1      Patient Instructions       Viral Upper Respiratory Illness (Adult)  You have a viral upper respiratory illness (URI), which is another term for the common cold. This illness is contagious during the first few days. It is spread through the air by coughing and sneezing. It may also be spread by direct contact (touching the sick person and then touching your own eyes, nose, or mouth). Frequent handwashing will decrease risk of spread. Most viral illnesses go away within 7 to 10 days with rest and simple home remedies. Sometimes the illness may last for several weeks. Antibiotics will not kill a virus, and they are generally not prescribed for this  condition.    Home care  · If symptoms are severe, rest at home for the first 2 to 3 days. When you resume activity, don't let yourself get too tired.  · Avoid being exposed to cigarette smoke (yours or others).  · You may use acetaminophen or ibuprofen to control pain and fever, unless another medicine was prescribed. (Note: If you have chronic liver or kidney disease, have ever had a stomach ulcer or gastrointestinal bleeding, or are taking blood-thinning medicines, talk with your healthcare provider before using these medicines.) Aspirin should never be given to anyone under 18 years of age who is ill with a viral infection or fever. It may cause severe liver or brain damage.  · Your appetite may be poor, so a light diet is fine. Avoid dehydration by drinking 6 to 8 glasses of fluids per day (water, soft drinks, juices, tea, or soup). Extra fluids will help loosen secretions in the nose and lungs.  · Over-the-counter cold medicines will not shorten the length of time youre sick, but they may be helpful for the following symptoms: cough, sore throat, and nasal and sinus congestion. (Note: Do not use decongestants if you have high blood pressure.)  Follow-up care  Follow up with your healthcare provider, or as advised.  When to seek medical advice  Call your healthcare provider right away if any of these occur:  · Cough with lots of colored sputum (mucus)  · Severe headache; face, neck, or ear pain  · Difficulty swallowing due to throat pain  · Fever of 100.4°F (38°C)  Call 911, or get immediate medical care  Call emergency services right away if any of these occur:  · Chest pain, shortness of breath, wheezing, or difficulty breathing  · Coughing up blood  · Inability to swallow due to throat pain  Date Last Reviewed: 9/13/2015  © 4022-1901 [employer]. [address] [place] [address], [place] [address]. All rights reserved. This information is not intended as a substitute for professional medical care.  Always follow your healthcare professional's instructions.        What Is Acute Bronchitis?  Acute bronchitis is when the airways in your lungs (bronchial tubes) become red and swollen (inflamed). It is usually caused by a viral infection. But it can also occur because of a bacteria or allergen. Symptoms include a cough that produces yellow or greenish mucus and can last for days or sometimes weeks.  Inside healthy lungs    Air travels in and out of the lungs through the airways. The linings of these airways produce sticky mucus. This mucus traps particles that enter the lungs. Tiny structures called cilia then sweep the particles out of the airways.     Healthy airway: Airways are normally open. Air moves in and out easily.      Healthy cilia: Tiny, hairlike cilia sweep mucus and particles up and out of the airways.   Lungs with bronchitis  Bronchitis often occurs with a cold or the flu virus. The airways become inflamed (red and swollen). There is a deep hacking cough from the extra mucus. Other symptoms may include:  · Wheezing  · Chest discomfort  · Shortness of breath  · Mild fever  A second infection, this time due to bacteria, may then occur. And airways irritated by allergens or smoke are more likely to get infected.        Inflamed airway: Inflammation and extra mucus narrow the airway, causing shortness of breath.      Impaired cilia: Extra mucus impairs cilia, causing congestion and wheezing. Smoking makes the problem worse.   Making a diagnosis  A physical exam, health history, and certain tests help your healthcare provider make the diagnosis.  Health history  Your healthcare provider will ask you about your symptoms.  The exam  Your provider listens to your chest for signs of congestion. He or she may also check your ears, nose, and throat.  Possible tests  · A sputum test for bacteria. This requires a sample of mucus from your lungs.  · A nasal or throat swab. This tests to see if you have a bacterial  infection.  · A chest X-ray. This is done if your healthcare provider thinks you have pneumonia.  · Tests to check for an underlying condition. Other tests may be done to check for things such as allergies, asthma, or COPD (chronic obstructive pulmonary disease). You may need to see a specialist for more lung function testing.  Treating a cough  The main treatment for bronchitis is easing symptoms. Avoiding smoke, allergens, and other things that trigger coughing can often help. If the infection is bacterial, you may be given antibiotics. During the illness, it's important to get plenty of sleep. To ease symptoms:  · Dont smoke. Also avoid secondhand smoke.  · Use a humidifier. Or try breathing in steam from a hot shower. This may help loosen mucus.  · Drink a lot of water and juice. They can soothe the throat and may help thin mucus.  · Sit up or use extra pillows when in bed. This helps to lessen coughing and congestion.  · Ask your provider about using medicine. Ask about using cough medicine, pain and fever medicine, or a decongestant.  Antibiotics  Most cases of bronchitis are caused by cold or flu viruses. They dont need antibiotics to treat them, even if your mucus is thick and green or yellow. Antibiotics dont treat viral illness and antibiotics have not been shown to have any benefit in cases of acute bronchitis. Taking antibiotics when they are not needed increases your risk of getting an infection later that is antibiotic-resistant. Antibiotics can also cause severe cases of diarrhea that require other antibiotics to treat.  It is important that you accept your healthcare provider's opinion to not use antibiotics. Your provider will prescribe antibiotics if the infection is caused by bacteria. If they are prescribed:  · Take all of the medicine. Take the medicine until it is used up, even if symptoms have improved. If you dont, the bronchitis may come back.  · Take the medicines as directed. For  instance, some medicines should be taken with food.  · Ask about side effects. Ask your provider or pharmacist what side effects are common, and what to do about them.  Follow-up care  You should see your provider again in 2 to 3 weeks. By this time, symptoms should have improved. An infection that lasts longer may mean you have a more serious problem.  Prevention  · Avoid tobacco smoke. If you smoke, quit. Stay away from smoky places. Ask friends and family not to smoke around you, or in your home or car.  · Get checked for allergies.  · Ask your provider about getting a yearly flu shot. Also ask about pneumococcal or pneumonia shots.  · Wash your hands often. This helps reduce the chance of picking up viruses that cause colds and flu.  Call your healthcare provider if:  · Symptoms worsen, or you have new symptoms  · Breathing problems worsen or  become severe  · Symptoms dont get better within a week, or within 3 days of taking antibiotics   Date Last Reviewed: 2/1/2017  © 6955-1297 [employer]. [address] [place] [address], [place] [address]. All rights reserved. This information is not intended as a substitute for professional medical care. Always follow your healthcare professional's instructions.      Please drink plenty of fluids.  Please get plenty of rest.  Please return here or go to the Emergency Department for any concerns or worsening of condition.  If you were given wait & see antibiotics, please wait 3-5 days before taking them, and only take them if your symptoms have worsened or not improved.  If you do begin taking the antibiotics, please take them to completion.  If you were prescribed antibiotics, please take them to completion.  If you were prescribed a narcotic medication, do not drive or operate heavy equipment or machinery while taking these medications.  If you do not have Hypertension or any history of palpitations, it is ok to take over the counter Sudafed or Mucinex D or  Allegra-D or Claritin-D or Zyrtec-D.  If you do take one of the above, it is ok to combine that with plain over the counter Mucinex or Allegra or Claritin or Zyrtec.  If for example you are taking Zyrtec -D, you can combine that with Mucinex, but not Mucinex-D.  If you are taking Mucinex-D, you can combine that with plain Allegra or Claritin or Zyrtec.   If you do have Hypertension or palpitations, it is safe to take Coricidin HBP for relief of sinus symptoms.  We recommend you take over the counter Flonase (Fluticasone) or another nasally inhaled steroid unless you are already taking one.  Nasal irrigation with a saline spray or Netti Pot like device per their directions is also recommended.  If not allergic, please take over the counter Tylenol (Acetaminophen) and/or Motrin (Ibuprofen) as directed for control of pain and/or fever.  Please follow up with your primary care doctor or specialist as needed.    If you  smoke, please stop smoking.

## 2018-05-02 ENCOUNTER — INITIAL CONSULT (OUTPATIENT)
Dept: UROGYNECOLOGY | Facility: CLINIC | Age: 43
End: 2018-05-02
Payer: MEDICAID

## 2018-05-02 VITALS
HEIGHT: 65 IN | BODY MASS INDEX: 36.07 KG/M2 | WEIGHT: 216.5 LBS | SYSTOLIC BLOOD PRESSURE: 130 MMHG | DIASTOLIC BLOOD PRESSURE: 80 MMHG

## 2018-05-02 DIAGNOSIS — K59.09 CHRONIC CONSTIPATION: ICD-10-CM

## 2018-05-02 DIAGNOSIS — J30.89 SEASONAL ALLERGIC RHINITIS DUE TO OTHER ALLERGIC TRIGGER: Primary | ICD-10-CM

## 2018-05-02 DIAGNOSIS — N81.11 CYSTOCELE, MIDLINE: ICD-10-CM

## 2018-05-02 DIAGNOSIS — N81.6 RECTOCELE, FEMALE: ICD-10-CM

## 2018-05-02 DIAGNOSIS — N94.10 DYSPAREUNIA, FEMALE: ICD-10-CM

## 2018-05-02 DIAGNOSIS — N39.3 FEMALE GENUINE STRESS INCONTINENCE: ICD-10-CM

## 2018-05-02 PROCEDURE — 99213 OFFICE O/P EST LOW 20 MIN: CPT | Mod: PBBFAC | Performed by: OBSTETRICS & GYNECOLOGY

## 2018-05-02 PROCEDURE — 87088 URINE BACTERIA CULTURE: CPT

## 2018-05-02 PROCEDURE — 99999 PR PBB SHADOW E&M-EST. PATIENT-LVL III: CPT | Mod: PBBFAC,,, | Performed by: OBSTETRICS & GYNECOLOGY

## 2018-05-02 PROCEDURE — 99215 OFFICE O/P EST HI 40 MIN: CPT | Mod: 25,S$PBB,, | Performed by: OBSTETRICS & GYNECOLOGY

## 2018-05-02 PROCEDURE — 87086 URINE CULTURE/COLONY COUNT: CPT

## 2018-05-02 PROCEDURE — 51701 INSERT BLADDER CATHETER: CPT | Mod: PBBFAC | Performed by: OBSTETRICS & GYNECOLOGY

## 2018-05-02 PROCEDURE — 51701 INSERT BLADDER CATHETER: CPT | Mod: S$PBB,,, | Performed by: OBSTETRICS & GYNECOLOGY

## 2018-05-02 RX ORDER — AZITHROMYCIN 250 MG/1
TABLET, FILM COATED ORAL
COMMUNITY
Start: 2018-04-05 | End: 2018-10-10

## 2018-05-02 NOTE — PROGRESS NOTES
[facility] [address] [place] [address]    Caridad De La Garza  [id number]  1975  May 2, 2018    Consulting Physician: Adriane Patel MD   GYN: MD Amanda  Primary M.D.: Abdifatah Auguste MD    Chief Complaint   Patient presents with    Urinary Incontinence     HPI:     1)  UI:  (+) ROYCE > (+) UUI (1-2 episodes) since last  (3 years ago).  Has had sever allergy issues since moving to [place].  Has been getting intermittent bronchitis.  This exacerbated ROYCE.  Has been seeing PCP--taking zyrtec or singulair.   (+) pads (liners) sometimes when has coughing fits, changes 5-6 times/day with these episodes.  These irritate the skin.  May be using dry weave products.  Could wear at night, too, if coughing worse. Daytime frequency: Q 2-3 hours.  Does try to hydrate.  Nocturia: Yes: 1/night, sometimes.   (--) dysuria,  (--) hematuria,  (--) frequent UTIs.  (+) complete bladder emptying. Strains at end of void to help.     2)  POP:  Absent.   (?) vaginal bleeding.  Noted some BRB on wiping after BMs but not sure of from vagina or rectum.  (--) vaginal discharge. (+) sexually active.  (+) dyspareunia.  With deep penetration--has h/o endometriosis.  Has to stop due to discomfort at times.  Is positional.   Feels same as before.   (--)  Vaginal dryness.  (--) vaginal estrogen use.     3)  BM:  (+) sometimes constipation/straining. BM frequency: Q2-3d.  Stool can have variable consistency.   (--) chronic diarrhea. (?) hematochezia.  Noted some BRB on wiping after BMs but not sure of from vagina or rectum. (--) fecal incontinence.  (--) fecal smearing/urgency.  (+) complete evacuation.      Past Medical History  Past Medical History:   Diagnosis Date    Abnormal Pap smear of cervix     rechecked and came back normal     Anemia     Endometriosis     Hypertension in pregnancy, preeclampsia, delivered    GERD    Past Surgical History  Past Surgical History:   Procedure Laterality  Date    PELVIC LAPAROSCOPY      [facility]- endometriosis   Dx LSC: was told endometriosis was severe--up sidewalls     ([place]):    1. Laparoscopic Bilateral salpingectomy  2. Lana endometrial ablation  OPERATIVE FINDINGS: On laparoscopy, retroverted uterus. Some filmy adhesions noted. Endometrial implants on uterine serosa, Otherwise, normal tubes and ovaries bilaterally.     Hysterectomy: No    Past Ob History  .  Also has 3 other kids ('s).     x 2.  C/s x 0.  SAB x 2.   Largest infant weight: 6#.   yes FAVD. no episiotomy.      Gynecologic History  LMP: Patient's last menstrual period was 2018 (exact date).  Age of menarche: 16 yo  Age of menopause: NA  Menstrual history: menorrhagia s/p ablation --lighter; no dysmenorrhea  Pap test: , normal/HPV neg.  History of abnormal paps: Yes x 1- repeat was normal.  History of STIs:  No  Mammogram: Date of last: 10/17.  Result: Normal  Colonoscopy: none  DEXA:  none    Family History  Family History   Problem Relation Age of Onset    Hypertension Father     Breast cancer Neg Hx     Ovarian cancer Neg Hx     Cervical cancer Neg Hx     Endometrial cancer Neg Hx     Vaginal cancer Neg Hx       Colon CA: No  Breast CA: MA  GYN CA: No   CA: No    Social History  History   Smoking Status    Light Tobacco Smoker    Packs/day: 0.25    Types: Cigarettes    Start date: 1993    Last attempt to quit: 2017   Smokeless Tobacco    Never Used     Cessation date: early  since had bronchitis.     History   Alcohol Use No   .    History   Drug Use No     The patient is .  Resides in [place] [address].  Employment status: just finished Business associates degree.      Allergies  Review of patient's allergies indicates:   Allergen Reactions    Aspirin Rash       Medications  Current Outpatient Prescriptions on File Prior to Visit   Medication Sig Dispense Refill    azelastine (ASTELIN) 137 mcg  "(0.1 %) nasal spray U 1 SPR IEN BID  0    benzoyl peroxide 5% 5 % gel CESAR TO THE AFFECTED AREA BID  2    cetirizine (ZYRTEC) 5 MG chewable tablet Take 1 tablet (5 mg total) by mouth once daily. 30 tablet 11    clotrimazole (LOTRIMIN) 1 % cream CESAR TO THE AFFECTED AREA  QD TO BID  3    FLONASE ALLERGY RELIEF 50 mcg/actuation nasal spray INSTILL 2 SPRAY IN EACH NOSTRIL EVERY DAY 9.9 Bottle 5    hydroquinone 4 % Crea APPLY AA BID  3    ketoconazole (NIZORAL) 2 % shampoo CESAR UTD QD  3    ketotifen (ZADITOR) 0.025 % (0.035 %) ophthalmic solution INT 1 GTT OU BID  2    montelukast (SINGULAIR) 10 mg tablet       pantoprazole (PROTONIX) 20 MG tablet Take 1 tablet (20 mg total) by mouth once daily. 30 tablet 11    tretinoin (RETIN-A) 0.05 % cream APPLY HS  3    triamcinolone (NASACORT) 55 mcg nasal inhaler USE 2 SPRAYS IN EACH NOSTRIL DAILY 16.9 g 0    cetirizine (ZYRTEC) 10 MG tablet TK 1 T PO Q NIGHT  0    prednisoLONE acetate (PRED FORTE) 1 % DrpS SHAKE LQ AND INT 1 GTT IN OU BID  0    promethazine (PHENERGAN) 6.25 mg/5 mL syrup Take 5 mLs (6.25 mg total) by mouth 4 (four) times daily as needed for Nausea. 120 mL 1     No current facility-administered medications on file prior to visit.        Review of Systems A 14 point ROS was reviewed with pertinent positives as noted above in the history of present illness.      Constitutional: negative  Eyes: negative  Endocrine: negative  Gastrointestinal: negative  Cardiovascular: negative  Respiratory: negative  Allergic/Immunologic: negative  Integumentary: negative  Psychiatric: negative  Musculoskeletal: negative   Ear/Nose/Throat: negative  Neurologic: negative  Genitourinary: SEE HPI  Hematologic/Lymphatic: negative   Breast: negative    Urogynecologic Exam  /80   Ht 5' 5" (1.651 m)   Wt 98.2 kg (216 lb 7.9 oz)   LMP 04/14/2018 (Exact Date)   BMI 36.03 kg/m²     GENERAL APPEARANCE:  The patient is well-developed, well-nourished.  Neck:  Supple with no " thyromegaly, no carotid bruits.  Heart:  Regular rate and rhythm, no murmurs, rubs or gallops.  Lungs:  Clear.  No CVA tenderness.  Abdomen:  Soft, nontender, nondistended, no hepatosplenomegaly.  Incisions:  LSC well-healed    PELVIC:    External genitalia:  Normal Bartholins, Skenes and labia bilaterally.    Urethra:  No caruncle, diverticulum or masses.  (+) hypermobility.  Slight kinking to UVJ.   Vagina:  Atrophy (--) , no bladder masses or tender, no discharge.  +LV TTP B, mild.  OI TTP B, mild.    Cervix:  normal appearance  Uterus: normal size, contour, position, consistency, mobility, non-tender.  Some mild TTP at post uterus, ROSENDA posterior fibroid.    Adnexa: Not palpable.    POP-Q:  Aa 0; Ba 0; C -4; Ap -1; Bp -1; D -6.  Genital hiatus 3, perineal body 2,  total vaginal length 10-11.    NEUROLOGIC:  Cranial nerves 2 through 12 intact.  Strength 5/5.  DTRs 2+ lower extremities.  S2 through 4 normal.  Sacral reflexes intact.    EXT: STRAUSS, 2+ pulses bilaterally, no C/C/E    COUGH STRESS TEST:  negative  KEGEL: 1 /5    RECTAL:    External:  Normal, (--) hemorrhoids, (--) dovetailing.   Internal: deferred    PVR: 40 mL; slight kinking at UVJ with CIC placement (better when cystocele reduced manually)    Impression    1. Seasonal allergic rhinitis due to other allergic trigger    2. Female genuine stress incontinence    3. Cystocele, midline    4. Rectocele, female    5. Dyspareunia, female    6. Chronic constipation        Initial Plan  The patient was counseled regarding these issues. The patient was given a summary sheet containing each of these issues with possible options for evaluation and management. When appropriate, we also reviewed computer-generated diagrams specific to their diagnoses..  All questions were addressed to the patient's satisfaction.    1)  Female stress incontinence:  --urine C&S  --see allergist to control that aspect  --Empty bladder every 3 hours.  Empty well: wait a minute, lean  forward on toilet.    --start pelvic floor PT.  Call in a few days to make appt:  Zenaida Espino:  [facility] in [place] (p) [phone].    --STRESS:  Pessary vs. Sling.     2)  Dyspareunia (pain with intercourse):  --seems triggered by muscles and posterior uterus (? Fibroid) on exam  --start with PT to learn muscle relaxation  --if not better, consider addressing uterus    3)  Constipation:  Controlling constipation may help bladder urgency/leakage and fiber may better control cholesterol and blood glucose.  Start daily fiber.  Take 1 tsp of fiber powder (psyllium or other sugar-free powder).  Mix in 8 oz of water.  Take x 3-5 days.  Then, increase fiber by 1 tsp every 3-5 days until stool is easy to pass.  Stop and continue at that dose.   Do not exceed 6 tsps/day.  May also use over the counter stool softener 1-2 x/day.  AVOID laxatives.  --want to have at least 2 BMs/week  --no blood around anus currently--if happens regularly, let us know so can consider need for colonoscopy or other testing    4)  Stage 2 cystocele/rectocele:  -no symptoms  --CTM for now  --could consider pessary or surgery in future (TVH/USS, A&P)    5)  RTC 3 months.     Approximately 60 min were spent in consult, 90 % in discussion.     Thank you for requesting consultation of your patient.  I look forward to participating in their care.    Norma Armas  Female Pelvic Medicine and Reconstructive Surgery  [facility] [place], [place]

## 2018-05-02 NOTE — PATIENT INSTRUCTIONS
Fiber Information Sheet  Your doctor has recommended that you follow a high fiber diet. The addition of fiber to your diet can make an enormous difference in your bowel control and regularity. Fiber helps people whether they lose stool or have trouble with constipation. Fiber works by bulking the stool and keeping it formed, yet making the movement soft and easy to pass. Fiber helps keep moisture within the stool so that neither diarrhea nor hard stool occurs. Fiber makes the bowels work more regularly, but it is not a laxative. An additional bonus from eating a high fiber diet is that your risk of cancer is reduced, too.    Most of us eat some high fiber foods already, but nearly all of us do not eat the necessary amount. For example, a slice of whole wheat bread contains only about 10% of the daily recommended amount of fiber. This means if you are relying on only whole wheat bread to meet the recommended fiber requirements, you would need to eat  between 10-18 slices every day! Please note that fiber is NOT in any meat or dairy product. It is only found in grains, vegetables and fruits. The recommended daily fiber intake is 20-25 grams. Foods having high fiber content include:     Fiber One Cereal, ½ cup 13.0 g   Bear beans, ¾ cup 10.4 g   Wheat bran cereal, 1 oz 10.0 g   Kidney beans, ¾ cup 9.3 g   All Bran Cereal, ½ cup 6.0 g   Oat Bran Cereal, hot, 1 oz 4.0 g   Banana, 1medium 3.8 g   Canned pears, ½ cup 3.7 g   3 prunes or ¼ cup raisins 3.5 g   Whole Wheat Total, 1 cup 3.0 g   Carrots, ½ cup 3.2 g   Apple, small 2.8 g   Broccoli, ½ cup 2.8 g   Cauliflower, ½ cup 2.6 g   Oatmeal, 1 oz 2.5 g   Whole Wheat Toast 2.0 g   Cheerios, 1 1/3 cup 2.0 g   Baked potato with skin 2.0 g   Corn, ½ cup 1.9 g   Popcorn, 3 cups 1.9 g   Orange, medium 1.9 g   Granola bar 1.0 g   Lettuce, ½ cup 0.9 g    If you dont think that you can get enough fiber through your everyday diet, there are many good fiber supplements you can  take along with eating your high fiber diet. Some of these are: Metamucil (1 heaping teaspoon or 1-2 wafers), Citrucel (1 tablespoon), Fiberall (1-2 wafers or 1 teaspoon), Perdium (2 rounded teaspoons) and 1-2 teaspoons unprocessed bran (to mix with foods)    You may need to use the fiber supplement up to 3-4 times daily to produce normal elimination. Please follow specific package directions or call us for help in regulating the dose. You may notice some bloating and/or increased gas at first. These symptoms can be relieved by adding fiber to your diet slowly. Once your body gets used to this increased fiber, these symptoms will go away.   ------------------------------------------------------      1)  Female stress incontinence:  --urine C&S  --see allergist to control that aspect  --Empty bladder every 3 hours.  Empty well: wait a minute, lean forward on toilet.    --start pelvic floor PT.  Call in a few days to make appt:  Zenaida Espino:  [facility] in [place] () [phone].    --STRESS:  Pessary vs. Sling.     2)  Dyspareunia (pain with intercourse):  --seems triggered by muscles and posterior uterus (? Fibroid) on exam  --start with PT to learn muscle relaxation  --if not better, consider addressing uterus    3)  Constipation:  Controlling constipation may help bladder urgency/leakage and fiber may better control cholesterol and blood glucose.  Start daily fiber.  Take 1 tsp of fiber powder (psyllium or other sugar-free powder).  Mix in 8 oz of water.  Take x 3-5 days.  Then, increase fiber by 1 tsp every 3-5 days until stool is easy to pass.  Stop and continue at that dose.   Do not exceed 6 tsps/day.  May also use over the counter stool softener 1-2 x/day.  AVOID laxatives.  --want to have at least 2 BMs/week  --no blood around anus currently--if happens regularly, let us know so can consider need for colonoscopy or other testing    4)  Stage 2 cystocele/rectocele:  -no symptoms  --CTM for  now  --could consider pessary or surgery in future (TVH/USS, A&P)    5)  RTC 3 months.

## 2018-05-02 NOTE — LETTER
May 2, 2018      Adriane Patel MD  [address]  [address]  [facility] [address]           [facility] [address] [place] [address]  Phone: [phone]          Patient: Caridad De La Garza   MR Number: [id number]   YOB: 1975   Date of Visit: 5/2/2018       Dear Dr. Adriane Patel:    Thank you for referring Caridad De La Garza to me for evaluation. Attached you will find relevant portions of my assessment and plan of care.    If you have questions, please do not hesitate to call me. I look forward to following Caridad De La Garza along with you.    Sincerely,    Norma Armas MD    Enclosure  CC:  No Recipients    If you would like to receive this communication electronically, please contact [email or web address] or [phone] to request more information on [employer] Link access.    For providers and/or their staff who would like to refer a patient to [facility], please contact us through our one-stop-shop provider referral line, [facility], at [phone].    If you feel you have received this communication in error or would no longer like to receive these types of communications, please e-mail [email or web address]

## 2018-05-04 LAB — BACTERIA UR CULT: NORMAL

## 2018-05-05 ENCOUNTER — TELEPHONE (OUTPATIENT)
Dept: UROGYNECOLOGY | Facility: CLINIC | Age: 43
End: 2018-05-05

## 2018-05-05 DIAGNOSIS — R82.71 BACTERIA IN URINE: Primary | ICD-10-CM

## 2018-05-05 RX ORDER — AMOXICILLIN AND CLAVULANATE POTASSIUM 875; 125 MG/1; MG/1
1 TABLET, FILM COATED ORAL 2 TIMES DAILY
Qty: 10 TABLET | Refills: 0 | Status: SHIPPED | OUTPATIENT
Start: 2018-05-05 | End: 2018-05-10

## 2018-05-05 NOTE — TELEPHONE ENCOUNTER
Please let patient know urine C&S showed some bacteria in urine.  Usually this type of bacteria is due to contamination from vaginal skin around area, and we don't treat it.  However, since she is having urinary incontinence, I would like to treat, in case the bacteria is contributing to seome of her bladder symptoms.  Augmentin x 5 days sent rossy [address] [place].  Please advise her to /start. Thanks!

## 2018-05-07 ENCOUNTER — TELEPHONE (OUTPATIENT)
Dept: UROGYNECOLOGY | Facility: CLINIC | Age: 43
End: 2018-05-07

## 2018-05-07 NOTE — TELEPHONE ENCOUNTER
----- Message from Vita Morales sent at 5/7/2018  9:20 AM CDT -----  Contact: self  Pt returning a missed call, she can be reached at [phone].

## 2018-05-07 NOTE — TELEPHONE ENCOUNTER
Spoke with pt and relayed following message;      Please let patient know urine C&S showed some bacteria in urine.  Usually this type of bacteria is due to contamination from vaginal skin around area, and we don't treat it.  However, since she is having urinary incontinence, I would like to treat, in case the bacteria is contributing to seome of her bladder symptoms.  Augmentin x 5 days sent rossy [address] [place].  Please advise her to /start. Thanks!     Pt voiced understanding and call ended.

## 2018-05-07 NOTE — TELEPHONE ENCOUNTER
Spoke to pt and relayed the following message:    Please let patient know urine C&S showed some bacteria in urine.  Usually this type of bacteria is due to contamination from vaginal skin around area, and we don't treat it.  However, since she is having urinary incontinence, I would like to treat, in case the bacteria is contributing to seome of her bladder symptoms.  Augmentin x 5 days sent rossy [address] [place].  Please advise her to /start. Thanks!    Pt voiced understanding and call ended.

## 2018-05-07 NOTE — TELEPHONE ENCOUNTER
Left voice message for pt to give the office a call back at [phone]. Called to relay UC results.

## 2018-05-09 ENCOUNTER — TELEPHONE (OUTPATIENT)
Dept: UROGYNECOLOGY | Facility: CLINIC | Age: 43
End: 2018-05-09

## 2018-05-10 NOTE — TELEPHONE ENCOUNTER
Informed pt, Dr. Armas does not have a specific allergist to referr her to. Pt voiced understanding and call ended.

## 2018-05-16 ENCOUNTER — OFFICE VISIT (OUTPATIENT)
Dept: ALLERGY | Facility: CLINIC | Age: 43
End: 2018-05-16
Payer: MEDICAID

## 2018-05-16 ENCOUNTER — LAB VISIT (OUTPATIENT)
Dept: LAB | Facility: HOSPITAL | Age: 43
End: 2018-05-16
Attending: FAMILY MEDICINE
Payer: MEDICAID

## 2018-05-16 VITALS — HEART RATE: 100 BPM | WEIGHT: 215.38 LBS | OXYGEN SATURATION: 99 % | BODY MASS INDEX: 35.88 KG/M2 | HEIGHT: 65 IN

## 2018-05-16 DIAGNOSIS — J31.0 CHRONIC RHINITIS: Primary | ICD-10-CM

## 2018-05-16 DIAGNOSIS — J31.0 CHRONIC RHINITIS: ICD-10-CM

## 2018-05-16 DIAGNOSIS — H10.423 SIMPLE CHRONIC CONJUNCTIVITIS OF BOTH EYES: ICD-10-CM

## 2018-05-16 DIAGNOSIS — L20.9 ATOPIC DERMATITIS, UNSPECIFIED TYPE: ICD-10-CM

## 2018-05-16 LAB — IGE SERPL-ACNC: <35 IU/ML

## 2018-05-16 PROCEDURE — 86003 ALLG SPEC IGE CRUDE XTRC EA: CPT

## 2018-05-16 PROCEDURE — 99999 PR PBB SHADOW E&M-EST. PATIENT-LVL III: CPT | Mod: PBBFAC,,, | Performed by: ALLERGY & IMMUNOLOGY

## 2018-05-16 PROCEDURE — 86003 ALLG SPEC IGE CRUDE XTRC EA: CPT | Mod: 59

## 2018-05-16 PROCEDURE — 36415 COLL VENOUS BLD VENIPUNCTURE: CPT | Mod: PO

## 2018-05-16 PROCEDURE — 99213 OFFICE O/P EST LOW 20 MIN: CPT | Mod: PBBFAC,PO | Performed by: ALLERGY & IMMUNOLOGY

## 2018-05-16 PROCEDURE — 99204 OFFICE O/P NEW MOD 45 MIN: CPT | Mod: S$PBB,,, | Performed by: ALLERGY & IMMUNOLOGY

## 2018-05-16 PROCEDURE — 82785 ASSAY OF IGE: CPT

## 2018-05-16 NOTE — PROGRESS NOTES
Subjective:       Patient ID: Caridad De La Garza is a 42 y.o. female.    Chief Complaint:  Allergies (worsening allergies since she moved her from [place])      43 yo woman presents for new patient evaluation of allergies. She states she moved here form [place] and is worse but has had issues for years. She gets watery itchy eyes. She has itchy ears, nose and face. She has sneezing, runny nose, PND and at times throat feels tight. Has cough from drip. Has congestion and at times hoarse voice. Feels tired and wants to sleep more. occ chest tightness but not really SOB or wheeze. Also has HA and pressure. She is starting to feel down from not feeling well. No H/o asthma. No prior eczema but has some irritation on face and scalp. Has all year, no season worse. No time of day worse. Worse when outside and likes to be out. lots of triggers - pets, pollen, mold, aerosols. She had test and told all positives and needed drops. Also told has food allergies but she does not avoid any specific food. No other medical issues. No insect or latex allergy. No surgeries.         Environmental History: see history section for home environment  Review of Systems   Constitutional: Positive for fatigue. Negative for appetite change, chills and fever.   HENT: Positive for congestion, postnasal drip, rhinorrhea, sinus pain, sinus pressure and sneezing. Negative for ear discharge, ear pain, facial swelling, nosebleeds, sore throat, trouble swallowing and voice change.    Eyes: Positive for discharge, redness and itching. Negative for visual disturbance.   Respiratory: Positive for cough. Negative for choking, chest tightness, shortness of breath and wheezing.    Cardiovascular: Negative for chest pain, palpitations and leg swelling.   Gastrointestinal: Negative for abdominal distention, abdominal pain, constipation, diarrhea, nausea and vomiting.   Genitourinary: Negative for difficulty urinating.   Musculoskeletal: Negative for arthralgias,  gait problem, joint swelling and myalgias.   Skin: Positive for rash. Negative for color change.   Neurological: Negative for dizziness, syncope, weakness, light-headedness and headaches.   Hematological: Negative for adenopathy. Does not bruise/bleed easily.   Psychiatric/Behavioral: Negative for agitation, behavioral problems, confusion and sleep disturbance. The patient is not nervous/anxious.         Objective:    Physical Exam   Constitutional: She is oriented to person, place, and time. She appears well-developed and well-nourished. No distress.   HENT:   Head: Normocephalic and atraumatic.   Right Ear: Hearing, tympanic membrane, external ear and ear canal normal.   Left Ear: Hearing, tympanic membrane, external ear and ear canal normal.   Nose: No mucosal edema, rhinorrhea, sinus tenderness or septal deviation. No epistaxis. Right sinus exhibits no maxillary sinus tenderness and no frontal sinus tenderness. Left sinus exhibits no maxillary sinus tenderness and no frontal sinus tenderness.   Mouth/Throat: Uvula is midline, oropharynx is clear and moist and mucous membranes are normal. No uvula swelling.   Eyes: Conjunctivae are normal. Right eye exhibits no discharge. Left eye exhibits no discharge.   Neck: Normal range of motion. No thyromegaly present.   Cardiovascular: Normal rate, regular rhythm and normal heart sounds.    No murmur heard.  Pulmonary/Chest: Effort normal and breath sounds normal. No respiratory distress. She has no wheezes.   Abdominal: Soft. She exhibits no distension. There is no tenderness.   Musculoskeletal: Normal range of motion. She exhibits no edema or tenderness.   Lymphadenopathy:     She has no cervical adenopathy.   Neurological: She is alert and oriented to person, place, and time.   Skin: Skin is warm and dry. No rash noted. No erythema.   Psychiatric: She has a normal mood and affect. Her behavior is normal. Judgment and thought content normal.   Nursing note and vitals  reviewed.      Laboratory:   none performed   Assessment:       1. Chronic rhinitis    2. Simple chronic conjunctivitis of both eyes    3. Atopic dermatitis, unspecified type         Plan:       1. Immunocaps today  2. continue azelastine 2 SEN BID as needed, zyrtec daily and Singulair daily  3. Phone review but pt interested emigdio allergy shots  3. Patient interested in allergy shots.  All risks and benefits discussed.  Protocol discussed in detail including need to remain in clinic for 30 minutes after injections.  All questions answered, handouts with details of allergy shots provided and informed consent provided, will discuss further after results.  Patient advised to remain off beta blockers while on allergy shots and to notify injection clinic and MD of any new medications starts.

## 2018-05-18 LAB
A ALTERNATA IGE QN: <0.35 KU/L
A FUMIGATUS IGE QN: <0.35 KU/L
ALLERGEN MAPLE/SYCAMORE IGE: <0.35 KU/L
ALLERGEN PENICILLIUM IGE: <0.35 KU/L
ALLERGEN WALNUT TREE IGE: <0.35 KU/L
ALLERGEN WHITE PINE TREE IGE: <0.35 KU/L
ALLERGEN WILLOW IGE: <0.35 KU/L
BAHIA GRASS IGE QN: <0.35 KU/L
BALD CYPRESS IGE QN: <0.35 KU/L
BERMUDA GRASS IGE QN: <0.35 KU/L
C GLOBOSUM IGE QN: <0.35 KU/L
C HERBARUM IGE QN: <0.35 KU/L
C LUNATA IGE QN: <0.35 KU/L
CAT DANDER IGE QN: <0.35 KU/L
COMMON RAGWEED IGE QN: <0.35 KU/L
COTTONWOOD IGE QN: <0.35 KU/L
D FARINAE IGE QN: <0.35 KU/L
D PTERONYSS IGE QN: <0.35 KU/L
DEPRECATED A ALTERNATA IGE RAST QL: NORMAL
DEPRECATED A FUMIGATUS IGE RAST QL: NORMAL
DEPRECATED BAHIA GRASS IGE RAST QL: NORMAL
DEPRECATED BALD CYPRESS IGE RAST QL: NORMAL
DEPRECATED BERMUDA GRASS IGE RAST QL: NORMAL
DEPRECATED C GLOBOSUM IGE RAST QL: NORMAL
DEPRECATED C HERBARUM IGE RAST QL: NORMAL
DEPRECATED C LUNATA IGE RAST QL: NORMAL
DEPRECATED CAT DANDER IGE RAST QL: NORMAL
DEPRECATED COMMON RAGWEED IGE RAST QL: NORMAL
DEPRECATED COTTONWOOD IGE RAST QL: NORMAL
DEPRECATED D FARINAE IGE RAST QL: NORMAL
DEPRECATED D PTERONYSS IGE RAST QL: NORMAL
DEPRECATED DOG DANDER IGE RAST QL: NORMAL
DEPRECATED ENGL PLANTAIN IGE RAST QL: NORMAL
DEPRECATED HORSE DANDER IGE RAST QL: NORMAL
DEPRECATED JOHNSON GRASS IGE RAST QL: NORMAL
DEPRECATED MARSH ELDER IGE RAST QL: NORMAL
DEPRECATED MUGWORT IGE RAST QL: NORMAL
DEPRECATED P BETAE IGE RAST QL: NORMAL
DEPRECATED PECAN/HICK TREE IGE RAST QL: NORMAL
DEPRECATED ROACH IGE RAST QL: ABNORMAL
DEPRECATED S ROSTRATA IGE RAST QL: NORMAL
DEPRECATED SALTWORT IGE RAST QL: NORMAL
DEPRECATED SILVER BIRCH IGE RAST QL: NORMAL
DEPRECATED TIMOTHY IGE RAST QL: NORMAL
DEPRECATED WHITE OAK IGE RAST QL: NORMAL
DOG DANDER IGE QN: <0.35 KU/L
ENGL PLANTAIN IGE QN: <0.35 KU/L
HORSE DANDER IGE QN: <0.35 KU/L
JOHNSON GRASS IGE QN: <0.35 KU/L
MAPLE/SYCAMORE CLASS: NORMAL
MARSH ELDER IGE QN: <0.35 KU/L
MUGWORT IGE QN: <0.35 KU/L
P BETAE IGE QN: <0.35 KU/L
PECAN/HICK TREE IGE QN: <0.35 KU/L
PENICILLIUM CLASS: NORMAL
RAGWEED, WESTERN IGE: <0.35 KU/L
RAGWEED, WESTERN, CLASS: NORMAL
ROACH IGE QN: 0.47 KU/L
S ROSTRATA IGE QN: <0.35 KU/L
SALTWORT IGE QN: <0.35 KU/L
SILVER BIRCH IGE QN: <0.35 KU/L
TIMOTHY IGE QN: <0.35 KU/L
WALNUT TREE CLASS: NORMAL
WHITE OAK IGE QN: <0.35 KU/L
WHITE PINE CLASS: NORMAL
WILLOW CLASS: NORMAL

## 2018-05-21 ENCOUNTER — TELEPHONE (OUTPATIENT)
Dept: ALLERGY | Facility: CLINIC | Age: 43
End: 2018-05-21

## 2018-05-21 NOTE — TELEPHONE ENCOUNTER
Spoke to pt, she said she misplaced paper work we gave her for allergy shots and asked to discuss labs.     Told her the only lab I saw that was positive was cockroach, that I would discuss with Dr Dutta to see if she wanted her to come back and do skin test to confirm results.      Pt said she would do whatever was necessary.

## 2018-05-22 NOTE — TELEPHONE ENCOUNTER
Please let her know only positive on blood test is cockroach. It may be that she has more irritant triggers or may be blood test missed some things. Would need to try for skin test which means of antihistamines for a week if she wants to consider allergy shots as we discussed

## 2018-06-19 ENCOUNTER — TELEPHONE (OUTPATIENT)
Dept: ALLERGY | Facility: CLINIC | Age: 43
End: 2018-06-19

## 2018-06-19 NOTE — TELEPHONE ENCOUNTER
Got message that person I called is not receiving calls. Sent Fuzmo message.     .      ----- Message from Que Abarca sent at 6/19/2018 12:25 PM CDT -----  Contact: self/ [phone]  Patient is requesting a call from the office reschedule her skin test.  Please advise, thanks

## 2018-07-23 ENCOUNTER — OFFICE VISIT (OUTPATIENT)
Dept: ALLERGY | Facility: CLINIC | Age: 43
End: 2018-07-23
Payer: MEDICAID

## 2018-07-23 VITALS — HEIGHT: 64 IN | HEART RATE: 98 BPM | WEIGHT: 220.25 LBS | OXYGEN SATURATION: 99 % | BODY MASS INDEX: 37.6 KG/M2

## 2018-07-23 DIAGNOSIS — J31.0 CHRONIC RHINITIS: Primary | ICD-10-CM

## 2018-07-23 DIAGNOSIS — H10.423 SIMPLE CHRONIC CONJUNCTIVITIS OF BOTH EYES: ICD-10-CM

## 2018-07-23 DIAGNOSIS — L20.9 ATOPIC DERMATITIS, UNSPECIFIED TYPE: ICD-10-CM

## 2018-07-23 PROCEDURE — 99213 OFFICE O/P EST LOW 20 MIN: CPT | Mod: PBBFAC,PO | Performed by: ALLERGY & IMMUNOLOGY

## 2018-07-23 PROCEDURE — 99999 PR PBB SHADOW E&M-EST. PATIENT-LVL III: CPT | Mod: PBBFAC,,, | Performed by: ALLERGY & IMMUNOLOGY

## 2018-07-23 PROCEDURE — 99213 OFFICE O/P EST LOW 20 MIN: CPT | Mod: S$PBB,,, | Performed by: ALLERGY & IMMUNOLOGY

## 2018-07-23 NOTE — PROGRESS NOTES
Subjective:       Patient ID: Caridad De La Garza is a 42 y.o. female.    Chief Complaint:  Allergy Testing (skin testing - environmental)      43 yo woman presents for continued evaluation of chronic rhinitis and conjunctivitis. She was last seen 5/2018. She had immunocaps done which were all negative. She would like to have skin test and returns for that but forgot to stop antihistamines for a week. Took couple days ago.     Prior History taken 5/16/18: new patient evaluation of allergies. She states she moved here form [place] and is worse but has had issues for years. She gets watery itchy eyes. She has itchy ears, nose and face. She has sneezing, runny nose, PND and at times throat feels tight. Has cough from drip. Has congestion and at times hoarse voice. Feels tired and wants to sleep more. occ chest tightness but not really SOB or wheeze. Also has HA and pressure. She is starting to feel down from not feeling well. No H/o asthma. No prior eczema but has some irritation on face and scalp. Has all year, no season worse. No time of day worse. Worse when outside and likes to be out. lots of triggers - pets, pollen, mold, aerosols. She had test and told all positives and needed drops. Also told has food allergies but she does not avoid any specific food. No other medical issues. No insect or latex allergy. No surgeries.         Environmental History: see history section for home environment  Review of Systems   Constitutional: Positive for fatigue. Negative for appetite change, chills and fever.   HENT: Positive for congestion, postnasal drip, rhinorrhea, sinus pain, sinus pressure and sneezing. Negative for ear discharge, ear pain, facial swelling, nosebleeds, sore throat, trouble swallowing and voice change.    Eyes: Positive for discharge, redness and itching. Negative for visual disturbance.   Respiratory: Positive for cough. Negative for choking, chest tightness, shortness of breath and wheezing.     Cardiovascular: Negative for chest pain, palpitations and leg swelling.   Gastrointestinal: Negative for abdominal distention, abdominal pain, constipation, diarrhea, nausea and vomiting.   Genitourinary: Negative for difficulty urinating.   Musculoskeletal: Negative for arthralgias, gait problem, joint swelling and myalgias.   Skin: Positive for rash. Negative for color change.   Neurological: Negative for dizziness, syncope, weakness, light-headedness and headaches.   Hematological: Negative for adenopathy. Does not bruise/bleed easily.   Psychiatric/Behavioral: Negative for agitation, behavioral problems, confusion and sleep disturbance. The patient is not nervous/anxious.         Objective:    Physical Exam   Constitutional: She is oriented to person, place, and time. She appears well-developed and well-nourished. No distress.   HENT:   Head: Normocephalic and atraumatic.   Right Ear: Hearing, tympanic membrane, external ear and ear canal normal.   Left Ear: Hearing, tympanic membrane, external ear and ear canal normal.   Nose: No mucosal edema, rhinorrhea, sinus tenderness or septal deviation. No epistaxis. Right sinus exhibits no maxillary sinus tenderness and no frontal sinus tenderness. Left sinus exhibits no maxillary sinus tenderness and no frontal sinus tenderness.   Mouth/Throat: Uvula is midline, oropharynx is clear and moist and mucous membranes are normal. No uvula swelling.   Eyes: Conjunctivae are normal. Right eye exhibits no discharge. Left eye exhibits no discharge.   Neck: Normal range of motion. No thyromegaly present.   Cardiovascular: Normal rate, regular rhythm and normal heart sounds.    No murmur heard.  Pulmonary/Chest: Effort normal and breath sounds normal. No respiratory distress. She has no wheezes.   Abdominal: Soft. She exhibits no distension. There is no tenderness.   Musculoskeletal: Normal range of motion. She exhibits no edema or tenderness.   Lymphadenopathy:     She has no  cervical adenopathy.   Neurological: She is alert and oriented to person, place, and time.   Skin: Skin is warm and dry. No rash noted. No erythema.   Psychiatric: She has a normal mood and affect. Her behavior is normal. Judgment and thought content normal.   Nursing note and vitals reviewed.      Laboratory:   none performed negative histamine and saline  Assessment:       1. Chronic rhinitis    2. Simple chronic conjunctivitis of both eyes    3. Atopic dermatitis, unspecified type         Plan:       1. RTC 1 week for skin test inhalants off all antihistamines for 7 days  2. continue azelastine 2 SEN BID as needed, zyrtec daily and Singulair daily but hold for skin test  3.  Patient interested in allergy shots.  All risks and benefits discussed.  Protocol discussed in detail including need to remain in clinic for 30 minutes after injections.  All questions answered, handouts with details of allergy shots provided and informed consent provided, will discuss further after results.  Patient advised to remain off beta blockers while on allergy shots and to notify injection clinic and MD of any new medications starts.

## 2018-07-30 ENCOUNTER — OFFICE VISIT (OUTPATIENT)
Dept: ALLERGY | Facility: CLINIC | Age: 43
End: 2018-07-30
Payer: MEDICAID

## 2018-07-30 VITALS — OXYGEN SATURATION: 99 % | WEIGHT: 218.25 LBS | HEIGHT: 64 IN | BODY MASS INDEX: 37.26 KG/M2 | HEART RATE: 93 BPM

## 2018-07-30 DIAGNOSIS — H10.423 SIMPLE CHRONIC CONJUNCTIVITIS OF BOTH EYES: ICD-10-CM

## 2018-07-30 DIAGNOSIS — J31.0 CHRONIC RHINITIS: Primary | ICD-10-CM

## 2018-07-30 DIAGNOSIS — L20.9 ATOPIC DERMATITIS, UNSPECIFIED TYPE: ICD-10-CM

## 2018-07-30 PROCEDURE — 99999 PR PBB SHADOW E&M-EST. PATIENT-LVL III: CPT | Mod: PBBFAC,,, | Performed by: ALLERGY & IMMUNOLOGY

## 2018-07-30 PROCEDURE — 95004 PERQ TESTS W/ALRGNC XTRCS: CPT | Mod: PBBFAC,PO | Performed by: ALLERGY & IMMUNOLOGY

## 2018-07-30 PROCEDURE — 95004 PERQ TESTS W/ALRGNC XTRCS: CPT | Mod: S$PBB,,, | Performed by: ALLERGY & IMMUNOLOGY

## 2018-07-30 PROCEDURE — 99213 OFFICE O/P EST LOW 20 MIN: CPT | Mod: PBBFAC,PO | Performed by: ALLERGY & IMMUNOLOGY

## 2018-07-30 PROCEDURE — 99213 OFFICE O/P EST LOW 20 MIN: CPT | Mod: S$PBB,25,, | Performed by: ALLERGY & IMMUNOLOGY

## 2018-07-30 NOTE — PROGRESS NOTES
Subjective:       Patient ID: Caridad De La Garza is a 42 y.o. female.    Chief Complaint:  Allergy Testing (skin testing)      43 yo woman presents for continued evaluation of chronic rhinitis and conjunctivitis. She was last seen 5/2018. She had immunocaps done which were all negative. She would like to have skin test and returns for that. She is off antihistamines and has noted more watery itchy eyes, sneeze and runny nose. No SOB or cough. No F/C     Prior History taken 5/16/18: new patient evaluation of allergies. She states she moved here form [place] and is worse but has had issues for years. She gets watery itchy eyes. She has itchy ears, nose and face. She has sneezing, runny nose, PND and at times throat feels tight. Has cough from drip. Has congestion and at times hoarse voice. Feels tired and wants to sleep more. occ chest tightness but not really SOB or wheeze. Also has HA and pressure. She is starting to feel down from not feeling well. No H/o asthma. No prior eczema but has some irritation on face and scalp. Has all year, no season worse. No time of day worse. Worse when outside and likes to be out. lots of triggers - pets, pollen, mold, aerosols. She had test and told all positives and needed drops. Also told has food allergies but she does not avoid any specific food. No other medical issues. No insect or latex allergy. No surgeries.         Environmental History: see history section for home environment  Review of Systems   Constitutional: Positive for fatigue. Negative for appetite change, chills and fever.   HENT: Positive for congestion, postnasal drip, rhinorrhea, sinus pain, sinus pressure and sneezing. Negative for ear discharge, ear pain, facial swelling, nosebleeds, sore throat, trouble swallowing and voice change.    Eyes: Positive for discharge, redness and itching. Negative for visual disturbance.   Respiratory: Positive for cough. Negative for choking, chest tightness, shortness of breath  and wheezing.    Cardiovascular: Negative for chest pain, palpitations and leg swelling.   Gastrointestinal: Negative for abdominal distention, abdominal pain, constipation, diarrhea, nausea and vomiting.   Genitourinary: Negative for difficulty urinating.   Musculoskeletal: Negative for arthralgias, gait problem, joint swelling and myalgias.   Skin: Positive for rash. Negative for color change.   Neurological: Negative for dizziness, syncope, weakness, light-headedness and headaches.   Hematological: Negative for adenopathy. Does not bruise/bleed easily.   Psychiatric/Behavioral: Negative for agitation, behavioral problems, confusion and sleep disturbance. The patient is not nervous/anxious.         Objective:    Physical Exam   Constitutional: She is oriented to person, place, and time. She appears well-developed and well-nourished. No distress.   HENT:   Head: Normocephalic and atraumatic.   Right Ear: Hearing, tympanic membrane, external ear and ear canal normal.   Left Ear: Hearing, tympanic membrane, external ear and ear canal normal.   Nose: No mucosal edema, rhinorrhea, sinus tenderness or septal deviation. No epistaxis. Right sinus exhibits no maxillary sinus tenderness and no frontal sinus tenderness. Left sinus exhibits no maxillary sinus tenderness and no frontal sinus tenderness.   Mouth/Throat: Uvula is midline, oropharynx is clear and moist and mucous membranes are normal. No uvula swelling.   Eyes: Conjunctivae are normal. Right eye exhibits no discharge. Left eye exhibits no discharge.   Neck: Normal range of motion. No thyromegaly present.   Cardiovascular: Normal rate, regular rhythm and normal heart sounds.    No murmur heard.  Pulmonary/Chest: Effort normal and breath sounds normal. No respiratory distress. She has no wheezes.   Abdominal: Soft. She exhibits no distension. There is no tenderness.   Musculoskeletal: Normal range of motion. She exhibits no edema or tenderness.   Lymphadenopathy:      She has no cervical adenopathy.   Neurological: She is alert and oriented to person, place, and time.   Skin: Skin is warm and dry. No rash noted. No erythema.   Psychiatric: She has a normal mood and affect. Her behavior is normal. Judgment and thought content normal.   Nursing note and vitals reviewed.      Laboratory:   Percutaneous Skin Testing: prick skin test inhalants #60, 7/30/18: 2+ histamine and negative saline controls, with remainder tested negative, see flow sheet  Assessment:       1. Chronic rhinitis    2. Simple chronic conjunctivitis of both eyes    3. Atopic dermatitis, unspecified type         Plan:       1. discussed IgE mediated allergy vs chronic non allergic rhinitis. She has the latter  2. resume azelastine 2 SEN BID as needed, zyrtec daily and Singulair daily  3.  Continue fluticasone 2 SEN daily as well.

## 2018-09-10 ENCOUNTER — TELEPHONE (OUTPATIENT)
Dept: UROGYNECOLOGY | Facility: CLINIC | Age: 43
End: 2018-09-10

## 2018-09-10 NOTE — TELEPHONE ENCOUNTER
----- Message from Luisito Bass sent at 9/10/2018  2:10 PM CDT -----  Contact: Gillian De La Garza            Name of Who is Calling: Gillian De La Garza      What is the request in detail: Patient would like to reschedule her appt on Sept 12. Unable to reschedule      Can the clinic reply by [facility]: No      What Number to Call Back if not in [facility]: [phone]

## 2018-10-10 ENCOUNTER — OFFICE VISIT (OUTPATIENT)
Dept: FAMILY MEDICINE | Facility: HOSPITAL | Age: 43
End: 2018-10-10
Attending: FAMILY MEDICINE
Payer: MEDICAID

## 2018-10-10 VITALS
DIASTOLIC BLOOD PRESSURE: 90 MMHG | WEIGHT: 222.25 LBS | BODY MASS INDEX: 37.03 KG/M2 | SYSTOLIC BLOOD PRESSURE: 135 MMHG | HEIGHT: 65 IN | HEART RATE: 85 BPM

## 2018-10-10 DIAGNOSIS — R53.83 FATIGUE, UNSPECIFIED TYPE: Primary | ICD-10-CM

## 2018-10-10 DIAGNOSIS — R03.0 ELEVATED BP WITHOUT DIAGNOSIS OF HYPERTENSION: ICD-10-CM

## 2018-10-10 DIAGNOSIS — Z00.00 HEALTHCARE MAINTENANCE: ICD-10-CM

## 2018-10-10 PROCEDURE — 99214 OFFICE O/P EST MOD 30 MIN: CPT | Performed by: FAMILY MEDICINE

## 2018-10-10 PROCEDURE — 90471 IMMUNIZATION ADMIN: CPT

## 2018-10-10 NOTE — PROGRESS NOTES
Subjective:       Patient ID: Caridad De La Garza is a 43 y.o. female.    Chief Complaint: Fatigue    HPI   44 yo female presents for an annual exam. She has been in a good state of health except that she has been fatigue. She complaints of anxiety and generalized bodyaches. She also smokes 3 cigarettes in the evenings. She feels like her stress level is not controlled and that leads to her smoking. 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 She complaints of weight gain, fatigue, constipation, hair thinning and hair loss.     Review of Systems   Constitutional: Positive for fatigue and unexpected weight change.   HENT: Negative.    Respiratory: Negative.    Cardiovascular: Negative.    Gastrointestinal: Positive for abdominal distention and constipation. Negative for nausea and vomiting.   Endocrine: Positive for polydipsia and polyuria. Negative for cold intolerance and heat intolerance.   Genitourinary: Negative.    Musculoskeletal: Positive for arthralgias and myalgias.   Neurological: Negative.    Psychiatric/Behavioral: The patient is nervous/anxious.        Objective:      Vitals:    10/10/18 0948   BP: (!) 135/90   Pulse: 85     Physical Exam   Constitutional: She is oriented to person, place, and time. She appears well-developed and well-nourished. No distress.   HENT:   Head: Normocephalic and atraumatic.   Nose: Nose normal.   Mouth/Throat: No oropharyngeal exudate.   Eyes: Conjunctivae are normal. Right eye exhibits no discharge. Left eye exhibits no discharge.   Neck: Normal range of motion. Neck supple. No JVD present. No tracheal deviation present.   Cardiovascular: Normal rate, regular rhythm, normal heart sounds and intact distal pulses. Exam reveals no gallop and no friction rub.   No murmur heard.  Pulmonary/Chest: Effort normal and breath sounds normal. No stridor. No respiratory distress. She has no wheezes. She has no rales. She exhibits no tenderness.   Abdominal: Soft. Bowel sounds are normal. She  exhibits no distension and no mass. There is no tenderness. There is no guarding.   Musculoskeletal: Normal range of motion. She exhibits no edema, tenderness or deformity.   Neurological: She is alert and oriented to person, place, and time.   Skin: Skin is warm and dry. No rash noted. She is not diaphoretic. No erythema. No pallor.   Nursing note and vitals reviewed.      Assessment:       1. Fatigue, unspecified type    2. Healthcare maintenance    3. Elevated BP without diagnosis of hypertension        Plan:       Fatigue, unspecified type  -     TSH; Future; Expected date: 10/10/2018  -     Hemoglobin A1c; Future; Expected date: 10/10/2018  -     Vitamin D; Future; Expected date: 10/10/2018    Healthcare maintenance  -     TSH; Future; Expected date: 10/10/2018  -     CBC auto differential; Future; Expected date: 10/10/2018  -     Comprehensive metabolic panel; Future; Expected date: 10/10/2018  -     Lipid panel; Future; Expected date: 10/10/2018  -     Hemoglobin A1c; Future; Expected date: 10/10/2018  -     Vitamin D; Future; Expected date: 10/10/2018  -     HIV 1 / 2 ANTIBODY; Future; Expected date: 10/10/2018  -     Hepatitis panel, acute; Future; Expected date: 10/10/2018  -     RPR; Future; Expected date: 10/10/2018    Elevated BP without diagnosis of hypertension  -     TSH; Future; Expected date: 10/10/2018  -     Comprehensive metabolic panel; Future; Expected date: 10/10/2018  -     Lipid panel; Future; Expected date: 10/10/2018  -     Hemoglobin A1c; Future; Expected date: 10/10/2018  - Pt advised on losing weight, diet, exercise and cut down salt intake.   - Pt also advised on keeping a record of her BP, to be addressed in her next visit.     Flu shot given today.     Follow-up in about 4 weeks (around 11/7/2018).     10/10/2018 10:14 AM Max Biswas M.D.

## 2018-12-22 ENCOUNTER — OFFICE VISIT (OUTPATIENT)
Dept: URGENT CARE | Facility: CLINIC | Age: 43
End: 2018-12-22
Payer: MEDICAID

## 2018-12-22 VITALS
DIASTOLIC BLOOD PRESSURE: 97 MMHG | HEART RATE: 83 BPM | OXYGEN SATURATION: 98 % | HEIGHT: 65 IN | WEIGHT: 222 LBS | SYSTOLIC BLOOD PRESSURE: 130 MMHG | BODY MASS INDEX: 36.99 KG/M2 | TEMPERATURE: 98 F | RESPIRATION RATE: 18 BRPM

## 2018-12-22 DIAGNOSIS — J06.9 UPPER RESPIRATORY TRACT INFECTION, UNSPECIFIED TYPE: Primary | ICD-10-CM

## 2018-12-22 PROCEDURE — 99214 OFFICE O/P EST MOD 30 MIN: CPT | Mod: S$GLB,,, | Performed by: INTERNAL MEDICINE

## 2018-12-22 RX ORDER — AZITHROMYCIN 250 MG/1
TABLET, FILM COATED ORAL
Qty: 6 TABLET | Refills: 0 | Status: SHIPPED | OUTPATIENT
Start: 2018-12-22 | End: 2019-03-04

## 2018-12-22 NOTE — PATIENT INSTRUCTIONS
If your condition worsens we recommend that you receive another evaluation at the emergency room immediately or contact your primary medical clinics after hours call service to discuss your concerns. You must understand that you've received an Urgent Care treatment only and that you may be released before all of your medical problems are known or treated. You, the patient, will arrange for follow up care as instructed.  Drink plenty of Fluids  Wash hands frequently using mild antibacterial soap lathering for at least 15 seconds then rinse  Get plenty of Rest  Salt water gargles  Follow up in 1-2 weeks with Primary Care physician if not significantly better.   If you are not allergic please Tylenol every 4-6 hours as needed and/or Ibuprofen every 6-8 hours as needed, over the counter for pain or fever.  Take OTC Cough/Congestion medicine as needed. Talk to your pharmacist about the best option for you.

## 2018-12-22 NOTE — PROGRESS NOTES
"Subjective:       Patient ID: Caridad De La Garza is a 43 y.o. female.    Vitals:  height is 5' 4.5" (1.638 m) and weight is 100.7 kg (222 lb). Her temperature is 97.7 °F (36.5 °C). Her blood pressure is 130/97 (abnormal) and her pulse is 83. Her respiration is 18 and oxygen saturation is 98%.     Chief Complaint: Cough    Patient has had a cough, sore throat, congestion and dizziness for the past 4 days. Taking Mucinex and nitquil.       Cough   This is a new problem. The current episode started in the past 7 days. The problem has been unchanged. The problem occurs constantly. The cough is productive of sputum. Associated symptoms include a sore throat. Pertinent negatives include no chills, ear pain, eye redness, fever, hemoptysis, myalgias, rash, shortness of breath or wheezing. She has tried OTC cough suppressant for the symptoms. The treatment provided mild relief.       Constitution: Negative for chills, sweating, fatigue and fever.   HENT: Positive for congestion, sinus pressure and sore throat. Negative for ear pain, sinus pain and voice change.    Neck: Negative for painful lymph nodes.   Eyes: Negative for eye redness.   Respiratory: Positive for cough and sputum production. Negative for chest tightness, bloody sputum, COPD, shortness of breath, stridor, wheezing and asthma.    Gastrointestinal: Negative for nausea and vomiting.   Musculoskeletal: Negative for muscle ache.   Skin: Negative for rash.   Allergic/Immunologic: Negative for seasonal allergies and asthma.   Neurological: Positive for dizziness and light-headedness.   Hematologic/Lymphatic: Negative for swollen lymph nodes.       Objective:      Physical Exam   Constitutional: She is oriented to person, place, and time. She appears well-developed and well-nourished. She is cooperative.  Non-toxic appearance. She does not appear ill. No distress.   HENT:   Head: Normocephalic and atraumatic.   Right Ear: Hearing, tympanic membrane, external ear " and ear canal normal.   Left Ear: Hearing, tympanic membrane, external ear and ear canal normal.   Nose: Mucosal edema present. No rhinorrhea or nasal deformity. No epistaxis. Right sinus exhibits no maxillary sinus tenderness and no frontal sinus tenderness. Left sinus exhibits no maxillary sinus tenderness and no frontal sinus tenderness.   Mouth/Throat: Uvula is midline, oropharynx is clear and moist and mucous membranes are normal. No trismus in the jaw. Normal dentition. No uvula swelling. No posterior oropharyngeal erythema.   Eyes: Conjunctivae and lids are normal. Right eye exhibits no discharge. Left eye exhibits no discharge. No scleral icterus.   Sclera clear bilat   Neck: Trachea normal, normal range of motion, full passive range of motion without pain and phonation normal. Neck supple.   Cardiovascular: Normal rate, regular rhythm, normal heart sounds, intact distal pulses and normal pulses.   Pulmonary/Chest: Effort normal and breath sounds normal. No respiratory distress.   Abdominal: Soft. Normal appearance and bowel sounds are normal. She exhibits no distension, no pulsatile midline mass and no mass. There is no tenderness.   Musculoskeletal: Normal range of motion. She exhibits no edema or deformity.   Neurological: She is alert and oriented to person, place, and time. She exhibits normal muscle tone. Coordination normal.   Skin: Skin is warm, dry and intact. She is not diaphoretic. No pallor.   Psychiatric: She has a normal mood and affect. Her speech is normal and behavior is normal. Judgment and thought content normal. Cognition and memory are normal.   Nursing note and vitals reviewed.      Assessment:       1. Upper respiratory tract infection, unspecified type        Plan:         Upper respiratory tract infection, unspecified type  -     azithromycin (Z-JESSE) 250 MG tablet; Take 2 tablets by mouth on day 1; Take 1 tablet by mouth on days 2-5  Dispense: 6 tablet; Refill: 0    If your condition  worsens we recommend that you receive another evaluation at the emergency room immediately or contact your primary medical clinics after hours call service to discuss your concerns. You must understand that you've received an Urgent Care treatment only and that you may be released before all of your medical problems are known or treated. You, the patient, will arrange for follow up care as instructed.  Drink plenty of Fluids  Wash hands frequently using mild antibacterial soap lathering for at least 15 seconds then rinse  Get plenty of Rest  Salt water gargles  Follow up in 1-2 weeks with Primary Care physician if not significantly better.   If you are not allergic please Tylenol every 4-6 hours as needed and/or Ibuprofen every 6-8 hours as needed, over the counter for pain or fever.  Take OTC Cough/Congestion medicine as needed. Talk to your pharmacist about the best option for you.

## 2019-03-04 ENCOUNTER — OFFICE VISIT (OUTPATIENT)
Dept: FAMILY MEDICINE | Facility: HOSPITAL | Age: 44
End: 2019-03-04
Attending: FAMILY MEDICINE
Payer: MEDICAID

## 2019-03-04 VITALS
HEART RATE: 80 BPM | WEIGHT: 214.06 LBS | SYSTOLIC BLOOD PRESSURE: 146 MMHG | BODY MASS INDEX: 36.55 KG/M2 | DIASTOLIC BLOOD PRESSURE: 84 MMHG | HEIGHT: 64 IN

## 2019-03-04 DIAGNOSIS — S86.811A PATELLAR TENDON STRAIN, RIGHT, INITIAL ENCOUNTER: Primary | ICD-10-CM

## 2019-03-04 DIAGNOSIS — R03.0 ELEVATED BLOOD PRESSURE READING: ICD-10-CM

## 2019-03-04 PROCEDURE — 99213 OFFICE O/P EST LOW 20 MIN: CPT | Performed by: FAMILY MEDICINE

## 2019-03-04 RX ORDER — PHENYLEPHRINE HCL/PROMETH HCL 5-6.25MG/5
SYRUP ORAL
Refills: 0 | COMMUNITY
Start: 2018-12-19 | End: 2019-03-04

## 2019-03-04 RX ORDER — NAPROXEN 500 MG/1
500 TABLET ORAL 2 TIMES DAILY WITH MEALS
Qty: 30 TABLET | Refills: 1 | Status: SHIPPED | OUTPATIENT
Start: 2019-03-04 | End: 2020-08-31

## 2019-03-06 ENCOUNTER — TELEPHONE (OUTPATIENT)
Dept: OBSTETRICS AND GYNECOLOGY | Facility: CLINIC | Age: 44
End: 2019-03-06

## 2019-03-06 NOTE — TELEPHONE ENCOUNTER
----- Message from Travis Zarate sent at 3/6/2019  9:16 AM CST -----  Contact: [phone]/self  Patient would like orders put in the system for a Mammo. Please advise.

## 2019-03-11 ENCOUNTER — TELEPHONE (OUTPATIENT)
Dept: OBSTETRICS AND GYNECOLOGY | Facility: CLINIC | Age: 44
End: 2019-03-11

## 2019-03-11 DIAGNOSIS — Z12.31 BREAST CANCER SCREENING BY MAMMOGRAM: Primary | ICD-10-CM

## 2019-03-11 NOTE — TELEPHONE ENCOUNTER
Contacted pt to see if she can come in on 3/19 for her annual and mammo instead of 3/13, pt states she is on her period so she the 19th works perfect however she would still like to keep her Mammo for 3/13 order pended.

## 2019-03-13 ENCOUNTER — OFFICE VISIT (OUTPATIENT)
Dept: URGENT CARE | Facility: CLINIC | Age: 44
End: 2019-03-13
Payer: MEDICAID

## 2019-03-13 ENCOUNTER — HOSPITAL ENCOUNTER (OUTPATIENT)
Dept: RADIOLOGY | Facility: HOSPITAL | Age: 44
Discharge: HOME | End: 2019-03-13
Attending: OBSTETRICS & GYNECOLOGY
Payer: MEDICAID

## 2019-03-13 VITALS
HEART RATE: 82 BPM | DIASTOLIC BLOOD PRESSURE: 88 MMHG | RESPIRATION RATE: 14 BRPM | BODY MASS INDEX: 36.54 KG/M2 | TEMPERATURE: 97 F | HEIGHT: 64 IN | SYSTOLIC BLOOD PRESSURE: 116 MMHG | OXYGEN SATURATION: 98 % | WEIGHT: 214 LBS

## 2019-03-13 DIAGNOSIS — R68.89 FLU-LIKE SYMPTOMS: Primary | ICD-10-CM

## 2019-03-13 DIAGNOSIS — B96.89 ACUTE BACTERIAL BRONCHITIS: ICD-10-CM

## 2019-03-13 DIAGNOSIS — Z12.31 BREAST CANCER SCREENING BY MAMMOGRAM: ICD-10-CM

## 2019-03-13 DIAGNOSIS — J20.8 ACUTE BACTERIAL BRONCHITIS: ICD-10-CM

## 2019-03-13 LAB
CTP QC/QA: YES
FLUAV AG NPH QL: NEGATIVE
FLUBV AG NPH QL: NEGATIVE

## 2019-03-13 PROCEDURE — 77063 MAMMO DIGITAL SCREENING BILAT WITH TOMOSYNTHESIS_CAD: ICD-10-PCS | Mod: 26,,, | Performed by: RADIOLOGY

## 2019-03-13 PROCEDURE — 77067 SCR MAMMO BI INCL CAD: CPT | Mod: 26,,, | Performed by: RADIOLOGY

## 2019-03-13 PROCEDURE — 77063 BREAST TOMOSYNTHESIS BI: CPT | Mod: 26,,, | Performed by: RADIOLOGY

## 2019-03-13 PROCEDURE — 87804 POCT INFLUENZA A/B: ICD-10-PCS | Mod: QW,S$GLB,, | Performed by: FAMILY MEDICINE

## 2019-03-13 PROCEDURE — 87804 INFLUENZA ASSAY W/OPTIC: CPT | Mod: QW,S$GLB,, | Performed by: FAMILY MEDICINE

## 2019-03-13 PROCEDURE — 77067 MAMMO DIGITAL SCREENING BILAT WITH TOMOSYNTHESIS_CAD: ICD-10-PCS | Mod: 26,,, | Performed by: RADIOLOGY

## 2019-03-13 PROCEDURE — 99214 PR OFFICE/OUTPT VISIT, EST, LEVL IV, 30-39 MIN: ICD-10-PCS | Mod: 25,S$GLB,, | Performed by: FAMILY MEDICINE

## 2019-03-13 PROCEDURE — 77067 SCR MAMMO BI INCL CAD: CPT | Mod: TC

## 2019-03-13 PROCEDURE — 99214 OFFICE O/P EST MOD 30 MIN: CPT | Mod: 25,S$GLB,, | Performed by: FAMILY MEDICINE

## 2019-03-13 RX ORDER — DEXAMETHASONE SODIUM PHOSPHATE 100 MG/10ML
10 INJECTION INTRAMUSCULAR; INTRAVENOUS
Status: COMPLETED | OUTPATIENT
Start: 2019-03-13 | End: 2019-03-13

## 2019-03-13 RX ORDER — AZITHROMYCIN 250 MG/1
TABLET, FILM COATED ORAL
Qty: 6 TABLET | Refills: 0 | Status: SHIPPED | OUTPATIENT
Start: 2019-03-13 | End: 2019-03-18

## 2019-03-13 RX ORDER — ALBUTEROL SULFATE 90 UG/1
2 AEROSOL, METERED RESPIRATORY (INHALATION) EVERY 6 HOURS PRN
Qty: 18 G | Refills: 0 | Status: SHIPPED | OUTPATIENT
Start: 2019-03-13 | End: 2020-10-26

## 2019-03-13 RX ORDER — METHYLPREDNISOLONE 4 MG/1
TABLET ORAL
Qty: 1 PACKAGE | Refills: 0 | Status: SHIPPED | OUTPATIENT
Start: 2019-03-13 | End: 2019-04-03

## 2019-03-13 RX ADMIN — DEXAMETHASONE SODIUM PHOSPHATE 10 MG: 100 INJECTION INTRAMUSCULAR; INTRAVENOUS at 06:03

## 2019-03-13 NOTE — PROGRESS NOTES
"Subjective:       Patient ID: Caridad De La Garza is a 43 y.o. female.    Vitals:  height is 5' 4" (1.626 m) and weight is 97.1 kg (214 lb). Her oral temperature is 97.2 °F (36.2 °C). Her blood pressure is 116/88 and her pulse is 82. Her respiration is 14 and oxygen saturation is 98%.     Chief Complaint: Nasal Congestion    Caridad started with nasal congestion about 2 days ago. Since then shes developed ear fullness, and a sore throat with body aches.       Sinus Problem   This is a new problem. The current episode started in the past 7 days. The problem is unchanged. There has been no fever. Her pain is at a severity of 0/10. She is experiencing no pain. Associated symptoms include congestion, coughing, ear pain, sinus pressure and a sore throat. Pertinent negatives include no chills, diaphoresis or shortness of breath. Past treatments include oral decongestants. The treatment provided no relief.       Constitution: Negative for chills, sweating, fatigue and fever.   HENT: Positive for ear pain, congestion, sinus pressure and sore throat. Negative for sinus pain and voice change.    Neck: Negative for painful lymph nodes.   Eyes: Negative for eye redness.   Respiratory: Positive for cough. Negative for chest tightness, sputum production, bloody sputum, COPD, shortness of breath, stridor, wheezing and asthma.    Gastrointestinal: Negative for nausea and vomiting.   Musculoskeletal: Positive for muscle ache.   Skin: Negative for rash.   Allergic/Immunologic: Negative for seasonal allergies and asthma.   Hematologic/Lymphatic: Negative for swollen lymph nodes.       Objective:      Physical Exam   Constitutional: She is oriented to person, place, and time. She appears well-developed and well-nourished. She is cooperative.  Non-toxic appearance. She does not appear ill. No distress.   HENT:   Head: Normocephalic and atraumatic.   Right Ear: Hearing, tympanic membrane, external ear and ear canal normal.   Left Ear: " Hearing, tympanic membrane, external ear and ear canal normal.   Nose: Nose normal. No mucosal edema, rhinorrhea or nasal deformity. No epistaxis. Right sinus exhibits no maxillary sinus tenderness and no frontal sinus tenderness. Left sinus exhibits no maxillary sinus tenderness and no frontal sinus tenderness.   Mouth/Throat: Uvula is midline, oropharynx is clear and moist and mucous membranes are normal. No trismus in the jaw. Normal dentition. No uvula swelling. No posterior oropharyngeal erythema.   Eyes: Conjunctivae and lids are normal. No scleral icterus.   Sclera clear bilat   Neck: Trachea normal, full passive range of motion without pain and phonation normal. Neck supple.   Cardiovascular: Normal rate, regular rhythm, normal heart sounds, intact distal pulses and normal pulses.   Pulmonary/Chest: Effort normal. No respiratory distress. She has wheezes. She has no rales.   Abdominal: Soft. Normal appearance and bowel sounds are normal. She exhibits no distension. There is no tenderness.   Musculoskeletal: Normal range of motion. She exhibits no edema or deformity.   Neurological: She is alert and oriented to person, place, and time. She exhibits normal muscle tone. Coordination normal.   Skin: Skin is warm, dry and intact. She is not diaphoretic. No pallor.   Psychiatric: She has a normal mood and affect. Her speech is normal and behavior is normal. Judgment and thought content normal. Cognition and memory are normal.   Nursing note and vitals reviewed.      Assessment:       No diagnosis found.    Plan:         There are no diagnoses linked to this encounter.

## 2019-03-13 NOTE — PATIENT INSTRUCTIONS
Bronchitis with Wheezing (Viral or Bacterial: Adult)    Bronchitis is an infection of the air passages. It often occurs during a cold and is usually caused by a virus. Symptoms include cough with mucus (phlegm) and low-grade fever. This illness is contagious during the first few days and is spread through the air by coughing and sneezing, or by direct contact (touching the sick person and then touching your own eyes, nose, or mouth).  If there is a lot of inflammation, air flow is restricted. The air passages may also go into spasm, especially if you have asthma. This causes wheezing and difficulty breathing even in people who do not have asthma.  Bronchitis usually lasts 7 to 14 days. The wheezing should improve with treatment during the first week. An inhaler is often prescribed to relax the air passages and stop wheezing. Antibiotics will be prescribed if your doctor thinks there is also a secondary bacterial infection.  Home care  · If symptoms are severe, rest at home for the first 2 to 3 days. When you go back to your usual activities, don't let yourself get too tired.  · Do not smoke. Also avoid being exposed to secondhand smoke.  · You may use over-the-counter medicine to control fever or pain, unless another medicine was prescribed. Note: If you have chronic liver or kidney disease or have ever had a stomach ulcer or gastrointestinal bleeding, talk with your healthcare provider before using these medicines. Also talk to your provider if you are taking medicine to prevent blood clots.) Aspirin should never be given to anyone younger than 18 years of age who is ill with a viral infection or fever. It may cause severe liver or brain damage.  · Your appetite may be poor, so a light diet is fine. Avoid dehydration by drinking 6 to 8 glasses of fluids per day (such as water, soft drinks, sports drinks, juices, tea, or soup). Extra fluids will help loosen secretions in the nose and lungs.  · Over-the-counter  cough, cold, and sore-throat medicines will not shorten the length of the illness, but they may be helpful to reduce symptoms. (Note: Do not use decongestants if you have high blood pressure.)  · If you were given an inhaler, use it exactly as directed. If you need to use it more often than prescribed, your condition may be worsening. If this happens, contact your healthcare provider.  · If prescribed, finish all antibiotic medicine, even if you are feeling better after only a few days.  Follow-up care  Follow up with your healthcare provider, or as advised. If you had an X-ray or ECG (electrocardiogram), a specialist will review it. You will be notified of any new findings that may affect your care.  Note: If you are age 65 or older, or if you have a chronic lung disease or condition that affects your immune system, or you smoke, talk to your healthcare provider about having a pneumococcal vaccinations and a yearly influenza vaccination (flu shot).  When to seek medical advice  Call your healthcare provider right away if any of these occur:  · Fever of 100.4°F (38°C) or higher  · Coughing up increasing amounts of colored sputum  · Weakness, drowsiness, headache, facial pain, ear pain, or a stiff neck  Call 911, or get immediate medical care  Contact emergency services right away if any of these occur.  · Coughing up blood  · Worsening weakness, drowsiness, headache, or stiff neck  · Increased wheezing not helped with medication, shortness of breath, or pain with breathing  Date Last Reviewed: 9/13/2015  © 8645-9326 [employer]. [address] [place] [address], [place] [address]. All rights reserved. This information is not intended as a substitute for professional medical care. Always follow your healthcare professional's instructions.

## 2019-03-18 RX ORDER — PANTOPRAZOLE SODIUM 20 MG/1
TABLET, DELAYED RELEASE ORAL
Qty: 30 TABLET | Refills: 0 | OUTPATIENT
Start: 2019-03-18

## 2019-03-20 ENCOUNTER — TELEPHONE (OUTPATIENT)
Dept: SMOKING CESSATION | Facility: CLINIC | Age: 44
End: 2019-03-20

## 2019-03-20 NOTE — TELEPHONE ENCOUNTER
I called patient as she was no show for [facility] and patient has rescheduled for 3/27/19. Patient forgot about the appointment.

## 2019-03-27 ENCOUNTER — TELEPHONE (OUTPATIENT)
Dept: SMOKING CESSATION | Facility: CLINIC | Age: 44
End: 2019-03-27

## 2019-03-27 RX ORDER — PANTOPRAZOLE SODIUM 20 MG/1
20 TABLET, DELAYED RELEASE ORAL DAILY
Qty: 30 TABLET | Refills: 11 | Status: SHIPPED | OUTPATIENT
Start: 2019-03-27 | End: 2020-08-31

## 2019-03-29 ENCOUNTER — CLINICAL SUPPORT (OUTPATIENT)
Dept: SMOKING CESSATION | Facility: CLINIC | Age: 44
End: 2019-03-29
Payer: COMMERCIAL

## 2019-03-29 DIAGNOSIS — F17.210 CIGARETTE SMOKER: Primary | ICD-10-CM

## 2019-03-29 PROCEDURE — 99999 PR PBB SHADOW E&M-EST. PATIENT-LVL III: ICD-10-PCS | Mod: PBBFAC,,,

## 2019-03-29 PROCEDURE — 99999 PR PBB SHADOW E&M-EST. PATIENT-LVL III: CPT | Mod: PBBFAC,,,

## 2019-03-29 PROCEDURE — 99404 PR PREVENT COUNSEL,INDIV,60 MIN: ICD-10-PCS | Mod: S$GLB,,, | Performed by: GENERAL PRACTICE

## 2019-03-29 PROCEDURE — 99404 PREV MED CNSL INDIV APPRX 60: CPT | Mod: S$GLB,,, | Performed by: GENERAL PRACTICE

## 2019-03-29 RX ORDER — BUPROPION HYDROCHLORIDE 150 MG/1
TABLET, EXTENDED RELEASE ORAL
Qty: 53 TABLET | Refills: 0 | Status: SHIPPED | OUTPATIENT
Start: 2019-03-29 | End: 2019-04-10

## 2019-03-29 NOTE — Clinical Note
Patient will be participating in weekly tobacco cessation meetings and will begin the prescribed tobacco cessation medication regime of the Wellbutrin. Patient has used NRT gum before.

## 2019-04-10 ENCOUNTER — CLINICAL SUPPORT (OUTPATIENT)
Dept: SMOKING CESSATION | Facility: CLINIC | Age: 44
End: 2019-04-10
Payer: COMMERCIAL

## 2019-04-10 DIAGNOSIS — F17.210 CIGARETTE SMOKER: Primary | ICD-10-CM

## 2019-04-10 PROCEDURE — 99407 PR TOBACCO USE CESSATION INTENSIVE >10 MINUTES: ICD-10-PCS | Mod: S$GLB,,, | Performed by: GENERAL PRACTICE

## 2019-04-10 PROCEDURE — 99999 PR PBB SHADOW E&M-EST. PATIENT-LVL II: CPT | Mod: PBBFAC,,,

## 2019-04-10 PROCEDURE — 99999 PR PBB SHADOW E&M-EST. PATIENT-LVL II: ICD-10-PCS | Mod: PBBFAC,,,

## 2019-04-10 PROCEDURE — 99407 BEHAV CHNG SMOKING > 10 MIN: CPT | Mod: S$GLB,,, | Performed by: GENERAL PRACTICE

## 2019-04-11 ENCOUNTER — OFFICE VISIT (OUTPATIENT)
Dept: URGENT CARE | Facility: CLINIC | Age: 44
End: 2019-04-11
Payer: MEDICAID

## 2019-04-11 VITALS
OXYGEN SATURATION: 99 % | WEIGHT: 214 LBS | DIASTOLIC BLOOD PRESSURE: 92 MMHG | HEIGHT: 64 IN | RESPIRATION RATE: 12 BRPM | TEMPERATURE: 98 F | BODY MASS INDEX: 36.54 KG/M2 | HEART RATE: 89 BPM | SYSTOLIC BLOOD PRESSURE: 129 MMHG

## 2019-04-11 DIAGNOSIS — B96.89 ACUTE BACTERIAL SINUSITIS: Primary | ICD-10-CM

## 2019-04-11 DIAGNOSIS — J01.90 ACUTE BACTERIAL SINUSITIS: Primary | ICD-10-CM

## 2019-04-11 DIAGNOSIS — H65.93 OTITIS MEDIA WITH EFFUSION, BILATERAL: ICD-10-CM

## 2019-04-11 DIAGNOSIS — J34.89 SINUS PRESSURE: ICD-10-CM

## 2019-04-11 PROCEDURE — 99214 PR OFFICE/OUTPT VISIT, EST, LEVL IV, 30-39 MIN: ICD-10-PCS | Mod: S$GLB,,, | Performed by: NURSE PRACTITIONER

## 2019-04-11 PROCEDURE — 99214 OFFICE O/P EST MOD 30 MIN: CPT | Mod: S$GLB,,, | Performed by: NURSE PRACTITIONER

## 2019-04-11 RX ORDER — PREDNISONE 20 MG/1
20 TABLET ORAL DAILY
Qty: 5 TABLET | Refills: 0 | Status: SHIPPED | OUTPATIENT
Start: 2019-04-11 | End: 2019-04-16

## 2019-04-11 RX ORDER — AMOXICILLIN 875 MG/1
875 TABLET, FILM COATED ORAL EVERY 12 HOURS
Qty: 20 TABLET | Refills: 0 | Status: SHIPPED | OUTPATIENT
Start: 2019-04-11 | End: 2019-04-21

## 2019-04-12 ENCOUNTER — TELEPHONE (OUTPATIENT)
Dept: SMOKING CESSATION | Facility: CLINIC | Age: 44
End: 2019-04-12

## 2019-04-12 NOTE — PROGRESS NOTES
"Subjective:       Patient ID: Caridad De La Garza is a 43 y.o. female.    Vitals:  height is 5' 4" (1.626 m) and weight is 97.1 kg (214 lb). Her oral temperature is 97.5 °F (36.4 °C). Her blood pressure is 129/92 (abnormal) and her pulse is 89. Her respiration is 12 and oxygen saturation is 99%.     Chief Complaint: Hypertension and Blood Pressure Check    Pt c/o elevated blood pressure, dizziness, headaches, blurred vision and light sensitive, 1 wk   Vit d level low, tsh wnl, blood pressure 120's/80-90's in oct 2018 (6 months ago)   Denies n/v, denies confusion, slurred speech, unilateral weekness. No hx of cad, hyperlipidemia, cva. Hx of chronic allergies and taking antihistamine and nasal spray with no improvement. Wears readers for reading-recent eye exam withit the past couple weeks.    Hypertension   This is a new problem. The current episode started today. The problem has been gradually worsening since onset. Associated symptoms include blurred vision and headaches. Pertinent negatives include no chest pain or shortness of breath. There are no associated agents (buproion) to hypertension. Past treatments include nothing. There are no compliance problems.        Constitution: Negative for chills, fatigue and fever.   HENT: Negative for congestion and sore throat.    Neck: Negative for painful lymph nodes.   Cardiovascular: Negative for chest pain and leg swelling.   Eyes: Positive for photophobia and blurred vision. Negative for double vision.   Respiratory: Negative for cough and shortness of breath.    Gastrointestinal: Positive for nausea. Negative for vomiting and diarrhea.   Genitourinary: Negative for dysuria, frequency, urgency and history of kidney stones.   Musculoskeletal: Negative for joint pain, joint swelling, muscle cramps and muscle ache.   Skin: Negative for color change, pale, rash and bruising.   Allergic/Immunologic: Negative for seasonal allergies.   Neurological: Positive for dizziness " and headaches. Negative for history of vertigo, light-headedness and passing out.   Hematologic/Lymphatic: Negative for swollen lymph nodes.   Psychiatric/Behavioral: Negative for nervous/anxious, sleep disturbance and depression. The patient is not nervous/anxious.        Objective:      Physical Exam   Constitutional: She is oriented to person, place, and time. She appears well-developed and well-nourished. She is cooperative.  Non-toxic appearance. She does not have a sickly appearance. She does not appear ill. No distress.   HENT:   Head: Normocephalic and atraumatic.   Right Ear: Hearing, external ear and ear canal normal. A middle ear effusion (dull, no light reflex) is present.   Left Ear: Hearing, external ear and ear canal normal. A middle ear effusion (dull, no light reflex) is present.   Nose: Mucosal edema and rhinorrhea present. No nasal deformity. No epistaxis. Right sinus exhibits maxillary sinus tenderness and frontal sinus tenderness. Left sinus exhibits maxillary sinus tenderness and frontal sinus tenderness.   Mouth/Throat: Uvula is midline and mucous membranes are normal. No trismus in the jaw. Normal dentition. No uvula swelling. Posterior oropharyngeal erythema present.   Eyes: Conjunctivae and lids are normal. Right eye exhibits no discharge. Left eye exhibits no discharge. No scleral icterus.   Sclera clear bilat   Neck: Trachea normal, normal range of motion, full passive range of motion without pain and phonation normal. Neck supple.   Cardiovascular: Normal rate, regular rhythm, normal heart sounds, intact distal pulses and normal pulses.   Pulses:       Radial pulses are 2+ on the right side, and 2+ on the left side.   Pulmonary/Chest: Effort normal and breath sounds normal. No stridor. No respiratory distress. She has no decreased breath sounds. She has no wheezes. She has no rhonchi.   Abdominal: Soft. Normal appearance and bowel sounds are normal. She exhibits no distension, no  pulsatile midline mass and no mass. There is no tenderness.   Musculoskeletal: Normal range of motion. She exhibits no edema or deformity.   Lymphadenopathy:     She has cervical adenopathy.   Neurological: She is alert and oriented to person, place, and time. She has normal strength. No cranial nerve deficit or sensory deficit. She exhibits normal muscle tone. Coordination and gait normal.   Skin: Skin is warm, dry and intact. She is not diaphoretic. No pallor.   Psychiatric: She has a normal mood and affect. Her speech is normal and behavior is normal. Judgment and thought content normal. Cognition and memory are normal.   Nursing note and vitals reviewed.      Assessment:       1. Acute bacterial sinusitis    2. Otitis media with effusion, bilateral    3. Sinus pressure        Plan:         Acute bacterial sinusitis  -     predniSONE (DELTASONE) 20 MG tablet; Take 1 tablet (20 mg total) by mouth once daily. for 5 days  Dispense: 5 tablet; Refill: 0  -     amoxicillin (AMOXIL) 875 MG tablet; Take 1 tablet (875 mg total) by mouth every 12 (twelve) hours. for 10 days  Dispense: 20 tablet; Refill: 0    Otitis media with effusion, bilateral    Sinus pressure      Patient Instructions   Follow up with your doctor in a few days.  Return to the urgent care or go to the ER if symptoms get worse.    START ORAL STEROID TOMORROW MORNING.      Start taking your antibiotics and take for the full duration.-AMOXICILLIN     Zyrtec, Claritin, or Allegra OTC as directed for the next 7 days  Add a decongestant to your antihistamine for congestion- like Zyrtec-D, Claritin D, Allegra D-this may increase your blood pressure.   If high blood pressure, an alternative decongestant is Coricidin HBP   Flonase OTC as directed for the next 7 days  Salt Water Nasal Spray OTC 3x/day for the next 7 days        Follow up with Primary Care or ENT if not improved in 7-10 Days        Sinusitis (Antibiotic Treatment)    The sinuses are air-filled  "spaces within the bones of the face. They connect to the inside of the nose. Sinusitis is an inflammation of the tissue lining the sinus cavity. Sinus inflammation can occur during a cold. It can also be due to allergies to pollens and other particles in the air. Sinusitis can cause symptoms of sinus congestion and fullness. A sinus infection causes fever, headache and facial pain. There is often green or yellow drainage from the nose or into the back of the throat (post-nasal drip). You have been given antibiotics to treat this condition.    Please return here or go to the Emergency Department for any concerns or worsening of condition.    Home care:  · Take the full course of antibiotics as instructed. Do not stop taking them, even if you feel better.  · Get rest!  · Drink plenty of water, hot tea, and other liquids. This may help thin mucus. It also may promote sinus drainage.  · Heat may help soothe painful areas of the face. Use a towel soaked in hot water. Or,  the shower and direct the hot spray onto your face. Using a vaporizer along with a menthol rub at night may also help.   · An expectorant containing guaifenesin may help thin the mucus and promote drainage from the sinuses.  · If you are a female and on birth control pills and take the antibiotics, use additional methods to prevent pregnancy while on the antibiotics and for one cycle after.  · Flonase (fluticasone) is an over the counter nasal spray which may help with your symptoms.  · Zyrtec D, Claritin D, or Allegra D can also help with symptoms of congestion and drainage  · If you have hypertesion, avoid using the "D" which is a decongestant.  · If clear drainage, you can take plain zyrtec, claritin, allegra.  · If congested, you can take pseudoephedrine-you need to ask for this behind the pharmacy counter-do not take if you have high blood pressure. Pheylephrine is on the shelf and is not effective.  · Tylenol or ibuprofen can be used as " directed for pain, unless you have allergies. Avoid ibuprofen if medical conditions such as stomach ulcers, kidney or liver disease, or blood thinners  · Afrin is effective if flying in the next few days. Take as directed for the airplane flight upon taking off and landing. Do not continue to use Afrin as it will cause rebound congestion.  · Don't smoke. This can worsen symptoms.  Follow-up care  Follow up with your healthcare provider or our staff if you are not improving within the next week.    When to seek medical advice  Call your healthcare provider if any of these occur:  · Facial pain or headache becoming more severe  · Stiff neck  · Unusual drowsiness or confusion  · Swelling of the forehead or eyelids  · Vision problems, including blurred or double vision  · Fever of 100.4ºF (38ºC) or higher, or as directed by your healthcare provider  · Seizure  · Breathing problems  · Symptoms not resolving within 10 days

## 2019-04-12 NOTE — TELEPHONE ENCOUNTER
Patient emailed that she had follow up with her PCP and the dizziness was cause d from sinus infection and not Wellbutrin. Patient will begin taking it again.

## 2019-04-12 NOTE — PATIENT INSTRUCTIONS
Follow up with your doctor in a few days.  Return to the urgent care or go to the ER if symptoms get worse.    START ORAL STEROID TOMORROW MORNING.      Start taking your antibiotics and take for the full duration.-AMOXICILLIN     Zyrtec, Claritin, or Allegra OTC as directed for the next 7 days  Add a decongestant to your antihistamine for congestion- like Zyrtec-D, Claritin D, Allegra D-this may increase your blood pressure.   If high blood pressure, an alternative decongestant is Coricidin HBP   Flonase OTC as directed for the next 7 days  Salt Water Nasal Spray OTC 3x/day for the next 7 days        Follow up with Primary Care or ENT if not improved in 7-10 Days        Sinusitis (Antibiotic Treatment)    The sinuses are air-filled spaces within the bones of the face. They connect to the inside of the nose. Sinusitis is an inflammation of the tissue lining the sinus cavity. Sinus inflammation can occur during a cold. It can also be due to allergies to pollens and other particles in the air. Sinusitis can cause symptoms of sinus congestion and fullness. A sinus infection causes fever, headache and facial pain. There is often green or yellow drainage from the nose or into the back of the throat (post-nasal drip). You have been given antibiotics to treat this condition.    Please return here or go to the Emergency Department for any concerns or worsening of condition.    Home care:  · Take the full course of antibiotics as instructed. Do not stop taking them, even if you feel better.  · Get rest!  · Drink plenty of water, hot tea, and other liquids. This may help thin mucus. It also may promote sinus drainage.  · Heat may help soothe painful areas of the face. Use a towel soaked in hot water. Or,  the shower and direct the hot spray onto your face. Using a vaporizer along with a menthol rub at night may also help.   · An expectorant containing guaifenesin may help thin the mucus and promote drainage from the  "sinuses.  · If you are a female and on birth control pills and take the antibiotics, use additional methods to prevent pregnancy while on the antibiotics and for one cycle after.  · Flonase (fluticasone) is an over the counter nasal spray which may help with your symptoms.  · Zyrtec D, Claritin D, or Allegra D can also help with symptoms of congestion and drainage  · If you have hypertesion, avoid using the "D" which is a decongestant.  · If clear drainage, you can take plain zyrtec, claritin, allegra.  · If congested, you can take pseudoephedrine-you need to ask for this behind the pharmacy counter-do not take if you have high blood pressure. Pheylephrine is on the shelf and is not effective.  · Tylenol or ibuprofen can be used as directed for pain, unless you have allergies. Avoid ibuprofen if medical conditions such as stomach ulcers, kidney or liver disease, or blood thinners  · Afrin is effective if flying in the next few days. Take as directed for the airplane flight upon taking off and landing. Do not continue to use Afrin as it will cause rebound congestion.  · Don't smoke. This can worsen symptoms.  Follow-up care  Follow up with your healthcare provider or our staff if you are not improving within the next week.    When to seek medical advice  Call your healthcare provider if any of these occur:  · Facial pain or headache becoming more severe  · Stiff neck  · Unusual drowsiness or confusion  · Swelling of the forehead or eyelids  · Vision problems, including blurred or double vision  · Fever of 100.4ºF (38ºC) or higher, or as directed by your healthcare provider  · Seizure  · Breathing problems  · Symptoms not resolving within 10 days          "

## 2019-04-17 ENCOUNTER — CLINICAL SUPPORT (OUTPATIENT)
Dept: SMOKING CESSATION | Facility: CLINIC | Age: 44
End: 2019-04-17
Payer: COMMERCIAL

## 2019-04-17 DIAGNOSIS — F17.210 CIGARETTE SMOKER: Primary | ICD-10-CM

## 2019-04-17 PROCEDURE — 90853 GROUP PSYCHOTHERAPY: CPT | Mod: S$GLB,,, | Performed by: GENERAL PRACTICE

## 2019-04-17 PROCEDURE — 99999 PR PBB SHADOW E&M-EST. PATIENT-LVL III: CPT | Mod: PBBFAC,,,

## 2019-04-17 PROCEDURE — 99999 PR PBB SHADOW E&M-EST. PATIENT-LVL III: ICD-10-PCS | Mod: PBBFAC,,,

## 2019-04-17 PROCEDURE — 90853 PR GROUP PSYCHOTHERAPY: ICD-10-PCS | Mod: S$GLB,,, | Performed by: GENERAL PRACTICE

## 2019-04-17 NOTE — Clinical Note
Patient is currently tobacco free and taking Wellbutrin with no negative side effects at this time. Patient has sinus infection and she states this was causing her dizziness and not Wellbutrin.

## 2019-04-18 NOTE — PROGRESS NOTES
Smoking Cessation Group Session #4    Site: [facility]  Date:  4/17/19  Clinical Status of Patient: Outpatient   Length of Service and Code: 60 minutes - 91403   Number in Attendance: 2  Group Activities/Focus of Group: completion of TCRS (Tobacco Cessation Rating Scale) reviewed strategies, habitual behavior, stress, and high risk situations. Introduced stress with addition interventions, SOLVE, relaxation with interventions, nutrition, exercise, weight gain, and the importance of rewarding oneself for accomplishments toward becoming tobacco free. Open discussion of all items with interventions.         Target symptoms:  withdrawal and medication side effects             The following were rated moderate (3) to severe (4) on TCRS:       Moderate 3: none     Severe 4:   none  Patient's Response to Intervention: Patient is currently tobacco free and taking Wellbutrin with no negative side effects at this time. Patient has sinus infection and she states this was causing her dizziness and not Wellbutrin.   Progress Toward Goals and Other Mental Status Changes: The patient denies any abnormal behavioral or mental changes at this time.         Diagnosis: Z72.0  Plan: The patient will continue with group therapy sessions and medication monitoring by CTTS. Prescribed medication management will be by physician.   Return to Clinic: 1 week    Quit Date: 4/10/19   Planned Quit Date:

## 2019-04-23 ENCOUNTER — TELEPHONE (OUTPATIENT)
Dept: SMOKING CESSATION | Facility: CLINIC | Age: 44
End: 2019-04-23

## 2019-04-23 NOTE — TELEPHONE ENCOUNTER
Patient sent me a text message that she will be out of town for group but plans on attending next week session.

## 2019-04-29 ENCOUNTER — OFFICE VISIT (OUTPATIENT)
Dept: URGENT CARE | Facility: CLINIC | Age: 44
End: 2019-04-29
Payer: MEDICAID

## 2019-04-29 VITALS
SYSTOLIC BLOOD PRESSURE: 143 MMHG | BODY MASS INDEX: 36.54 KG/M2 | DIASTOLIC BLOOD PRESSURE: 97 MMHG | HEIGHT: 64 IN | HEART RATE: 103 BPM | OXYGEN SATURATION: 98 % | TEMPERATURE: 101 F | WEIGHT: 214 LBS

## 2019-04-29 DIAGNOSIS — J20.8 ACUTE BACTERIAL BRONCHITIS: ICD-10-CM

## 2019-04-29 DIAGNOSIS — R68.89 FLU-LIKE SYMPTOMS: Primary | ICD-10-CM

## 2019-04-29 DIAGNOSIS — B96.89 ACUTE BACTERIAL BRONCHITIS: ICD-10-CM

## 2019-04-29 LAB
CTP QC/QA: YES
FLUAV AG NPH QL: NEGATIVE
FLUBV AG NPH QL: NEGATIVE

## 2019-04-29 PROCEDURE — 87804 POCT INFLUENZA A/B: ICD-10-PCS | Mod: 59,QW,S$GLB, | Performed by: FAMILY MEDICINE

## 2019-04-29 PROCEDURE — 87804 INFLUENZA ASSAY W/OPTIC: CPT | Mod: QW,S$GLB,, | Performed by: FAMILY MEDICINE

## 2019-04-29 PROCEDURE — 99214 OFFICE O/P EST MOD 30 MIN: CPT | Mod: S$GLB,,, | Performed by: FAMILY MEDICINE

## 2019-04-29 PROCEDURE — 99214 PR OFFICE/OUTPT VISIT, EST, LEVL IV, 30-39 MIN: ICD-10-PCS | Mod: S$GLB,,, | Performed by: FAMILY MEDICINE

## 2019-04-29 RX ORDER — DEXAMETHASONE SODIUM PHOSPHATE 100 MG/10ML
10 INJECTION INTRAMUSCULAR; INTRAVENOUS
Status: COMPLETED | OUTPATIENT
Start: 2019-04-29 | End: 2019-04-29

## 2019-04-29 RX ORDER — DOXYCYCLINE 100 MG/1
100 CAPSULE ORAL 2 TIMES DAILY
Qty: 20 CAPSULE | Refills: 0 | Status: SHIPPED | OUTPATIENT
Start: 2019-04-29 | End: 2019-05-09

## 2019-04-29 RX ORDER — METHYLPREDNISOLONE 4 MG/1
TABLET ORAL
Qty: 1 PACKAGE | Refills: 0 | Status: SHIPPED | OUTPATIENT
Start: 2019-04-29 | End: 2019-05-20

## 2019-04-29 RX ADMIN — DEXAMETHASONE SODIUM PHOSPHATE 10 MG: 100 INJECTION INTRAMUSCULAR; INTRAVENOUS at 08:04

## 2019-04-30 NOTE — PROGRESS NOTES
"Subjective:       Patient ID: Caridad De La Garza is a 43 y.o. female.    Vitals:  height is 5' 4" (1.626 m) and weight is 97.1 kg (214 lb). Her oral temperature is 100.5 °F (38.1 °C) (abnormal). Her blood pressure is 143/97 (abnormal) and her pulse is 103. Her oxygen saturation is 98%.     Chief Complaint: Nasal Congestion    Caridad came back from [place] on Friday with body aches, chills, sweats and a cough. She is also having complaints of dizziness and chest congestion/nasal congestion.    Sinus Problem   This is a new problem. The current episode started in the past 7 days. The problem has been rapidly worsening since onset. The maximum temperature recorded prior to her arrival was 100.4 - 100.9 F. The fever has been present for less than 1 day. Her pain is at a severity of 0/10. She is experiencing no pain. Associated symptoms include chills, congestion, coughing, diaphoresis, ear pain, sinus pressure and a sore throat. Pertinent negatives include no shortness of breath. Past treatments include oral decongestants. The treatment provided no relief.       Constitution: Positive for chills, sweating, fatigue and fever.   HENT: Positive for ear pain, congestion, sinus pressure and sore throat. Negative for sinus pain and voice change.    Neck: Negative for painful lymph nodes.   Eyes: Negative for eye redness.   Respiratory: Positive for cough. Negative for chest tightness, sputum production, bloody sputum, COPD, shortness of breath, stridor, wheezing and asthma.    Gastrointestinal: Negative for nausea and vomiting.   Musculoskeletal: Negative for muscle ache.   Skin: Negative for rash.   Allergic/Immunologic: Negative for seasonal allergies and asthma.   Hematologic/Lymphatic: Negative for swollen lymph nodes.       Objective:      Physical Exam   Constitutional: She is oriented to person, place, and time. She appears well-developed and well-nourished. She is cooperative.  Non-toxic appearance. She does not " appear ill. No distress.   HENT:   Head: Normocephalic and atraumatic.   Right Ear: Hearing, tympanic membrane, external ear and ear canal normal.   Left Ear: Hearing, tympanic membrane, external ear and ear canal normal.   Nose: Nose normal. No mucosal edema, rhinorrhea or nasal deformity. No epistaxis. Right sinus exhibits no maxillary sinus tenderness and no frontal sinus tenderness. Left sinus exhibits no maxillary sinus tenderness and no frontal sinus tenderness.   Mouth/Throat: Uvula is midline, oropharynx is clear and moist and mucous membranes are normal. No trismus in the jaw. Normal dentition. No uvula swelling. No posterior oropharyngeal erythema.   Eyes: Conjunctivae and lids are normal. No scleral icterus.   Sclera clear bilat   Neck: Trachea normal, full passive range of motion without pain and phonation normal. Neck supple.   Cardiovascular: Normal rate, regular rhythm, normal heart sounds, intact distal pulses and normal pulses.   Pulmonary/Chest: Effort normal. No respiratory distress. She has wheezes. She has no rales.   Abdominal: Soft. Normal appearance and bowel sounds are normal. She exhibits no distension. There is no tenderness.   Musculoskeletal: Normal range of motion. She exhibits no edema or deformity.   Neurological: She is alert and oriented to person, place, and time. She exhibits normal muscle tone. Coordination normal.   Skin: Skin is warm, dry and intact. She is not diaphoretic. No pallor.   Psychiatric: She has a normal mood and affect. Her speech is normal and behavior is normal. Judgment and thought content normal. Cognition and memory are normal.   Nursing note and vitals reviewed.      Assessment:       1. Flu-like symptoms    2. Acute bacterial bronchitis        Plan:         Flu-like symptoms  -     POCT Influenza A/B  -     Ambulatory referral to Pulmonology    Acute bacterial bronchitis  -     Ambulatory referral to Pulmonology    Other orders  -     dexamethasone injection  10 mg  -     doxycycline (VIBRAMYCIN) 100 MG Cap; Take 1 capsule (100 mg total) by mouth 2 (two) times daily. for 10 days  Dispense: 20 capsule; Refill: 0  -     methylPREDNISolone (MEDROL DOSEPACK) 4 mg tablet; use as directed  Dispense: 1 Package; Refill: 0

## 2019-04-30 NOTE — PATIENT INSTRUCTIONS
Bronchitis, Antibiotic Treatment (Adult)    Bronchitis is an infection of the air passages (bronchial tubes) in your lungs. It often occurs when you have a cold. This illness is contagious during the first few days and is spread through the air by coughing and sneezing, or by direct contact (touching the sick person and then touching your own eyes, nose, or mouth).  Symptoms of bronchitis include cough with mucus (phlegm) and low-grade fever. Bronchitis usually lasts 7 to 14 days. Mild cases can be treated with simple home remedies. More severe infection is treated with an antibiotic.  Home care  Follow these guidelines when caring for yourself at home:  · If your symptoms are severe, rest at home for the first 2 to 3 days. When you go back to your usual activities, don't let yourself get too tired.  · Do not smoke. Also avoid being exposed to secondhand smoke.  · You may use over-the-counter medicines to control fever or pain, unless another medicine was prescribed. (Note: If you have chronic liver or kidney disease or have ever had a stomach ulcer or gastrointestinal bleeding, talk with your healthcare provider before using these medicines. Also talk to your provider if you are taking medicine to prevent blood clots.) Aspirin should never be given to anyone younger than 18 years of age who is ill with a viral infection or fever. It may cause severe liver or brain damage.  · Your appetite may be poor, so a light diet is fine. Avoid dehydration by drinking 6 to 8 glasses of fluids per day (such as water, soft drinks, sports drinks, juices, tea, or soup). Extra fluids will help loosen secretions in the nose and lungs.  · Over-the-counter cough, cold, and sore-throat medicines will not shorten the length of the illness, but they may be helpful to reduce symptoms. (Note: Do not use decongestants if you have high blood pressure.)  · Finish all antibiotic medicine. Do this even if you are feeling better after only a  few days.  Follow-up care  Follow up with your healthcare provider, or as advised. If you had an X-ray or ECG (electrocardiogram), a specialist will review it. You will be notified of any new findings that may affect your care.  Note: If you are age 65 or older, or if you have a chronic lung disease or condition that affects your immune system, or you smoke, talk to your healthcare provider about having pneumococcal vaccinations and a yearly influenza vaccination (flu shot).  When to seek medical advice  Call your healthcare provider right away if any of these occur:  · Fever of 100.4°F (38°C) or higher  · Coughing up increased amounts of colored sputum  · Weakness, drowsiness, headache, facial pain, ear pain, or a stiff neck  Call 911, or get immediate medical care  Contact emergency services right away if any of these occur.  · Coughing up blood  · Worsening weakness, drowsiness, headache, or stiff neck  · Trouble breathing, wheezing, or pain with breathing  Date Last Reviewed: 9/13/2015  © 1347-8116 [employer]. [address] [place] [address], [place] [address]. All rights reserved. This information is not intended as a substitute for professional medical care. Always follow your healthcare professional's instructions.

## 2019-05-01 ENCOUNTER — CLINICAL SUPPORT (OUTPATIENT)
Dept: SMOKING CESSATION | Facility: CLINIC | Age: 44
End: 2019-05-01
Payer: COMMERCIAL

## 2019-05-01 DIAGNOSIS — F17.210 CIGARETTE SMOKER: Primary | ICD-10-CM

## 2019-05-01 PROCEDURE — 99999 PR PBB SHADOW E&M-EST. PATIENT-LVL III: CPT | Mod: PBBFAC,,,

## 2019-05-01 PROCEDURE — 90853 GROUP PSYCHOTHERAPY: CPT | Mod: S$GLB,,, | Performed by: GENERAL PRACTICE

## 2019-05-01 PROCEDURE — 99999 PR PBB SHADOW E&M-EST. PATIENT-LVL III: ICD-10-PCS | Mod: PBBFAC,,,

## 2019-05-01 PROCEDURE — 90853 PR GROUP PSYCHOTHERAPY: ICD-10-PCS | Mod: S$GLB,,, | Performed by: GENERAL PRACTICE

## 2019-05-02 RX ORDER — BUPROPION HYDROCHLORIDE 150 MG/1
150 TABLET, EXTENDED RELEASE ORAL 2 TIMES DAILY
Qty: 60 TABLET | Refills: 0 | Status: SHIPPED | OUTPATIENT
Start: 2019-05-02 | End: 2019-06-13 | Stop reason: SDUPTHER

## 2019-05-02 NOTE — PROGRESS NOTES
Smoking Cessation Group Session #6    Site: [facility]  Date:  5/1/19  Clinical Status of Patient: Outpatient   Length of Service and Code: 60 minutes - 11338   Number in Attendance: 2  Group Activities/Focus of Group: completion of TCRS (Tobacco Cessation Rating Scale) reviewed strategies, cues, triggers, high risk situations, lapses, relapses, diet, exercise, stress, relaxation, sleep, habitual behavior, and life style changes.      Target symptoms:  withdrawal and medication side effects             The following were rated moderate (3) to severe (4) on TCRS:       Moderate 3: none     Severe 4:   none  Patient's Response to Intervention: Patient remains tobacco free and taking Wellbutrin with no negative side effects at this time.  Progress Toward Goals and Other Mental Status Changes: The patient denies any abnormal behavioral or mental changes at this time.         Diagnosis: Z72.0  Plan: The patient will continue with group therapy sessions and medication monitoring by CTTS. Prescribed medication management will be by physician.   Return to Clinic: 1 week    Quit Date: 4/10/19   Planned Quit Date:

## 2019-05-14 ENCOUNTER — CLINICAL SUPPORT (OUTPATIENT)
Dept: SMOKING CESSATION | Facility: CLINIC | Age: 44
End: 2019-05-14
Payer: COMMERCIAL

## 2019-05-14 DIAGNOSIS — F17.200 TOBACCO USE DISORDER: Primary | ICD-10-CM

## 2019-05-14 PROCEDURE — 99999 PR PBB SHADOW E&M-EST. PATIENT-LVL III: ICD-10-PCS | Mod: PBBFAC,,,

## 2019-05-14 PROCEDURE — 99406 PR TOBACCO USE CESSATION INTERMEDIATE 3-10 MINUTES: ICD-10-PCS | Mod: S$GLB,,, | Performed by: GENERAL PRACTICE

## 2019-05-14 PROCEDURE — 99999 PR PBB SHADOW E&M-EST. PATIENT-LVL III: CPT | Mod: PBBFAC,,,

## 2019-05-14 PROCEDURE — 99406 BEHAV CHNG SMOKING 3-10 MIN: CPT | Mod: S$GLB,,, | Performed by: GENERAL PRACTICE

## 2019-05-15 ENCOUNTER — CLINICAL SUPPORT (OUTPATIENT)
Dept: SMOKING CESSATION | Facility: CLINIC | Age: 44
End: 2019-05-15
Payer: COMMERCIAL

## 2019-05-15 DIAGNOSIS — F17.200 TOBACCO USE DISORDER: Primary | ICD-10-CM

## 2019-05-15 PROCEDURE — 99999 PR PBB SHADOW E&M-EST. PATIENT-LVL III: ICD-10-PCS | Mod: PBBFAC,,,

## 2019-05-15 PROCEDURE — 90853 GROUP PSYCHOTHERAPY: CPT | Mod: S$GLB,,, | Performed by: GENERAL PRACTICE

## 2019-05-15 PROCEDURE — 90853 PR GROUP PSYCHOTHERAPY: ICD-10-PCS | Mod: S$GLB,,, | Performed by: GENERAL PRACTICE

## 2019-05-15 PROCEDURE — 99999 PR PBB SHADOW E&M-EST. PATIENT-LVL III: CPT | Mod: PBBFAC,,,

## 2019-05-16 NOTE — PROGRESS NOTES
Smoking Cessation Group Session #2    Site: [facility]  Date:  5/15/19  Clinical Status of Patient: Outpatient   Length of Service and Code: 60 minutes - 39531   Number in Attendance: 2  Group Activities/Focus of Group:  completion of TCRS (Tobacco Cessation Rating Scale) reviewed strategies, cues, and triggers. Introduced the negative impact of tobacco on health, the health advantages of discontinuing the use of tobacco, time line improved health changes after a quit, withdrawal issues to expect from nicotine and habit, and ways to achieve the goal of a quit.        Target symptoms:  withdrawal and medication side effects             The following were rated moderate (3) to severe (4) on TCRS:       Moderate 3: none     Severe 4:   none  Patient's Response to Intervention: Patient remains tobacco free and taking Wellbutrin with no negative side effects at this time.  Progress Toward Goals and Other Mental Status Changes: The patient denies any abnormal behavioral or mental changes at this time.         Diagnosis: Z72.0  Plan: The patient will continue with group therapy sessions and medication monitoring by CTTS. Prescribed medication management will be by physician.   Return to Clinic: 1 week    Quit Date: 4/10/19   Planned Quit Date:

## 2019-06-13 ENCOUNTER — CLINICAL SUPPORT (OUTPATIENT)
Dept: SMOKING CESSATION | Facility: CLINIC | Age: 44
End: 2019-06-13
Payer: COMMERCIAL

## 2019-06-13 DIAGNOSIS — F17.200 TOBACCO USE DISORDER: Primary | ICD-10-CM

## 2019-06-13 DIAGNOSIS — F17.210 CIGARETTE SMOKER: ICD-10-CM

## 2019-06-13 PROCEDURE — 99999 PR PBB SHADOW E&M-EST. PATIENT-LVL II: CPT | Mod: PBBFAC,,,

## 2019-06-13 PROCEDURE — 99407 PR TOBACCO USE CESSATION INTENSIVE >10 MINUTES: ICD-10-PCS | Mod: S$GLB,,, | Performed by: GENERAL PRACTICE

## 2019-06-13 PROCEDURE — 99999 PR PBB SHADOW E&M-EST. PATIENT-LVL II: ICD-10-PCS | Mod: PBBFAC,,,

## 2019-06-13 PROCEDURE — 99407 BEHAV CHNG SMOKING > 10 MIN: CPT | Mod: S$GLB,,, | Performed by: GENERAL PRACTICE

## 2019-06-13 RX ORDER — BUPROPION HYDROCHLORIDE 150 MG/1
150 TABLET, EXTENDED RELEASE ORAL 2 TIMES DAILY
Qty: 60 TABLET | Refills: 0 | Status: SHIPPED | OUTPATIENT
Start: 2019-06-13 | End: 2019-08-22 | Stop reason: ALTCHOICE

## 2019-08-22 ENCOUNTER — OFFICE VISIT (OUTPATIENT)
Dept: URGENT CARE | Facility: CLINIC | Age: 44
End: 2019-08-22
Payer: MEDICAID

## 2019-08-22 VITALS
BODY MASS INDEX: 36.54 KG/M2 | DIASTOLIC BLOOD PRESSURE: 94 MMHG | TEMPERATURE: 99 F | HEART RATE: 100 BPM | HEIGHT: 64 IN | WEIGHT: 214 LBS | OXYGEN SATURATION: 98 % | RESPIRATION RATE: 18 BRPM | SYSTOLIC BLOOD PRESSURE: 135 MMHG

## 2019-08-22 DIAGNOSIS — J34.89 SINUS PRESSURE: Primary | ICD-10-CM

## 2019-08-22 DIAGNOSIS — J30.89 ENVIRONMENTAL AND SEASONAL ALLERGIES: ICD-10-CM

## 2019-08-22 DIAGNOSIS — R51.9 SINUS HEADACHE: ICD-10-CM

## 2019-08-22 DIAGNOSIS — H65.93 OTITIS MEDIA WITH EFFUSION, BILATERAL: ICD-10-CM

## 2019-08-22 PROCEDURE — 99214 PR OFFICE/OUTPT VISIT, EST, LEVL IV, 30-39 MIN: ICD-10-PCS | Mod: S$GLB,,, | Performed by: NURSE PRACTITIONER

## 2019-08-22 PROCEDURE — 99214 OFFICE O/P EST MOD 30 MIN: CPT | Mod: S$GLB,,, | Performed by: NURSE PRACTITIONER

## 2019-08-22 RX ORDER — KETOROLAC TROMETHAMINE 30 MG/ML
30 INJECTION, SOLUTION INTRAMUSCULAR; INTRAVENOUS
Status: COMPLETED | OUTPATIENT
Start: 2019-08-22 | End: 2019-08-22

## 2019-08-22 RX ORDER — METHYLPREDNISOLONE 4 MG/1
TABLET ORAL
Qty: 1 PACKAGE | Refills: 0 | Status: SHIPPED | OUTPATIENT
Start: 2019-08-22 | End: 2020-10-26 | Stop reason: CLARIF

## 2019-08-22 RX ORDER — AZELASTINE 1 MG/ML
1 SPRAY, METERED NASAL 2 TIMES DAILY
Qty: 30 ML | Refills: 0 | Status: SHIPPED | OUTPATIENT
Start: 2019-08-22 | End: 2020-08-31

## 2019-08-22 RX ORDER — DEXAMETHASONE SODIUM PHOSPHATE 100 MG/10ML
8 INJECTION INTRAMUSCULAR; INTRAVENOUS
Status: COMPLETED | OUTPATIENT
Start: 2019-08-22 | End: 2019-08-22

## 2019-08-22 RX ADMIN — DEXAMETHASONE SODIUM PHOSPHATE 8 MG: 100 INJECTION INTRAMUSCULAR; INTRAVENOUS at 05:08

## 2019-08-22 RX ADMIN — KETOROLAC TROMETHAMINE 30 MG: 30 INJECTION, SOLUTION INTRAMUSCULAR; INTRAVENOUS at 05:08

## 2019-08-22 NOTE — LETTER
August 22, 2019      [facility] [address], [address]  [facility] [address]  Phone: [phone]  Fax: [phone]       Patient: Caridad De La Garza   YOB: 1975  Date of Visit: 08/22/2019    To Whom It May Concern:    Satish De La Garza  was at [facility] on 08/22/2019. She may return to work/school on 8/24/2019 with no restrictions. If you have any questions or concerns, or if I can be of further assistance, please do not hesitate to contact me.    Sincerely,        Eneida Martínez NP

## 2019-08-22 NOTE — PROGRESS NOTES
"Subjective:       Patient ID: Caridad De La Garza is a 44 y.o. female.    Vitals:  height is 5' 4" (1.626 m) and weight is 97.1 kg (214 lb). Her oral temperature is 98.7 °F (37.1 °C). Her blood pressure is 135/94 (abnormal) and her pulse is 100. Her respiration is 18 and oxygen saturation is 98%.     Chief Complaint: Otalgia; Sinus Problem; and Headache    Pt presents today with sinus pressure, ear fullness, hard to hear out of both ears, productive cough, headache from sinus pressure. Pt has been taking OTC meds with no relief.         No hx of cad, hyperlipidemia, cva. Hx of chronic allergies     Otalgia    There is pain in both ears. This is a new problem. The current episode started in the past 7 days. The problem occurs constantly. The problem has been unchanged. There has been no fever. The pain is at a severity of 8/10. The pain is moderate. Associated symptoms include coughing, headaches and hearing loss. Pertinent negatives include no abdominal pain, diarrhea, ear discharge, neck pain, rash, rhinorrhea, sore throat or vomiting. Treatments tried: OTC meds. The treatment provided no relief.   Sinus Problem   Associated symptoms include congestion, coughing, ear pain and headaches. Pertinent negatives include no chills, diaphoresis, neck pain, shortness of breath, sinus pressure or sore throat.   Headache    Associated symptoms include coughing, ear pain and hearing loss. Pertinent negatives include no abdominal pain, eye redness, fever, nausea, neck pain, rhinorrhea, sinus pressure, sore throat or vomiting.       Constitution: Negative for chills, sweating, fatigue and fever.   HENT: Positive for ear pain, hearing loss, congestion and postnasal drip. Negative for ear discharge, sinus pain, sinus pressure, sore throat and voice change.    Neck: Negative for neck pain and painful lymph nodes.   Eyes: Negative for eye redness.   Respiratory: Positive for cough and sputum production. Negative for chest " tightness, bloody sputum, COPD, shortness of breath, stridor, wheezing and asthma.    Gastrointestinal: Negative for abdominal pain, nausea, vomiting and diarrhea.   Musculoskeletal: Negative for muscle ache.   Skin: Negative for rash.   Allergic/Immunologic: Negative for seasonal allergies and asthma.   Neurological: Positive for headaches.   Hematologic/Lymphatic: Negative for swollen lymph nodes.       Objective:      Physical Exam   Constitutional: She is oriented to person, place, and time. She appears well-developed and well-nourished. She is cooperative.  Non-toxic appearance. She does not appear ill. No distress.   HENT:   Head: Normocephalic and atraumatic.   Right Ear: Hearing, external ear and ear canal normal. Tympanic membrane is injected and bulging. A middle ear effusion (no light reflex) is present.   Left Ear: Hearing, external ear and ear canal normal. Tympanic membrane is bulging. A middle ear effusion (no light reflex) is present.   Nose: Mucosal edema present. No rhinorrhea or nasal deformity. No epistaxis. Right sinus exhibits maxillary sinus tenderness and frontal sinus tenderness. Left sinus exhibits maxillary sinus tenderness and frontal sinus tenderness.   Mouth/Throat: Uvula is midline and mucous membranes are normal. No trismus in the jaw. Normal dentition. No uvula swelling. Posterior oropharyngeal erythema present.   Eyes: Conjunctivae and lids are normal. No scleral icterus.   Sclera clear bilat   Neck: Trachea normal, full passive range of motion without pain and phonation normal. Neck supple.   Cardiovascular: Normal rate, regular rhythm, normal heart sounds, intact distal pulses and normal pulses.   Pulmonary/Chest: Effort normal and breath sounds normal. No respiratory distress.   Abdominal: Soft. Normal appearance and bowel sounds are normal. She exhibits no distension. There is no tenderness.   Musculoskeletal: Normal range of motion. She exhibits no edema or deformity.    Neurological: She is alert and oriented to person, place, and time. She exhibits normal muscle tone. Coordination normal.   Skin: Skin is warm, dry and intact. She is not diaphoretic. No pallor.   Psychiatric: She has a normal mood and affect. Her speech is normal and behavior is normal. Judgment and thought content normal. Cognition and memory are normal.   Nursing note and vitals reviewed.      Assessment:       1. Sinus pressure    2. Sinus headache    3. Otitis media with effusion, bilateral    4. Environmental and seasonal allergies        Plan:     discussed f/u with ent regarding chronic otitis effusion/sinus pressure    Sinus pressure  -     dexamethasone injection 8 mg  -     ketorolac injection 30 mg  -     methylPREDNISolone (MEDROL DOSEPACK) 4 mg tablet; use as directed  Dispense: 1 Package; Refill: 0    Sinus headache  -     dexamethasone injection 8 mg  -     ketorolac injection 30 mg  -     methylPREDNISolone (MEDROL DOSEPACK) 4 mg tablet; use as directed  Dispense: 1 Package; Refill: 0    Otitis media with effusion, bilateral  -     methylPREDNISolone (MEDROL DOSEPACK) 4 mg tablet; use as directed  Dispense: 1 Package; Refill: 0    Environmental and seasonal allergies  -     azelastine (ASTELIN) 137 mcg (0.1 %) nasal spray; 1 spray (137 mcg total) by Nasal route 2 (two) times daily.  Dispense: 30 mL; Refill: 0      Patient Instructions     Follow up with your doctor in a few days.  Return to the urgent care or go to the ER if symptoms get worse.    You received a steroid shot today - this can elevate your blood pressure, elevate your blood sugar, water weight gain, nervous energy, redness to the face and dimpling of the skin where the shot goes in.       Start oral steroid tomorrow.  flonase twice a day followed by astalin spray twice daily.  Referral to ent placed with contact info for assistance out of [facility]. Please call 1 [phone] if you do not hear from [facility] to get your  referral appointment we discussed.      Access Navigator team who handles Medicaid referrals [facility]. The main line for that team (for your referrals into [facility]) is [phone] Access Line - helps Medicaid patients to find Medicaid care OUTSIDE of [facility]. Medicaid Access Line contact is: [phone]?  [email or web address] - [phone] - a community line that can help refer       Follow up if not improving.  Phenergan for nausea/dizziness- will cause drowsiness.        Access Navigator team who handles Medicaid referrals INTO [facility]. The main line for that team (for your referrals into [facility]) is [phone] Access Line - helps Medicaid patients to find Medicaid care OUTSIDE of [facility]. Medicaid Access Line contact is: [phone]?  www.[employer] - [phone] - a community line that can help refer         Sinus Headaches     When using nasal spray, keep your chin down and angle the spray away from center.     Sinus headaches can cause a gnawing pain behind the nose and eyes. The pain most often gets worse in the afternoon and evening. You may also run a fever. Sinus headaches are caused by colds or allergies that make the nasal passages inflamed or infected.  To help prevent sinus headaches:  · Treat colds promptly to keep mucus from backing up.  · Avoid things that trigger sinus problems, such as pollens, dust, smoke, fumes, and strong odors.  · Take allergy medicines as directed by your healthcare provider.  To relieve the pain:  · Keep your sinuses open and free of mucus. Try over-the-counter sinus rinse products.  · Use a nasal decongestant as directed to reduce the inflammation.  · Drink fluids to keep the mucus thinner. This helps it drain more easily. You can also use a humidifier.  · Apply hot packs to the area around your sinuses. Use a hot water bottle.  · See your healthcare provider if your sinus headache lasts more than 2 weeks. You may need medicine for a sinus infection or an exam to check  for other headache conditions, like migraines.  Date Last Reviewed: 10/1/2016  © 6377-7274 [employer]. [address] [place] [address], [place] [address]. All rights reserved. This information is not intended as a substitute for professional medical care. Always follow your healthcare professional's instructions.        Earache, No Infection (Adult)  Earaches can happen without an infection. This occurs when air and fluid build up behind the eardrum causing a feeling of fullness and discomfort and reduced hearing. This is called otitis media with effusion (OME) or serous otitis media. It means there is fluid in the middle ear. It is not the same as acute otitis media, which is typically from infection.  OME can happen when you have a cold if congestion blocks the passage that drains the middle ear. This passage is called the eustachian tube. OME may also occur with nasal allergies or after a bacterial middle ear infection.    The pain or discomfort may come and go. You may hear clicking or popping sounds when you chew or swallow. You may feel that your balance is off. Or you may hear ringing in the ear.  It often takes from several weeks up to 3 months for the fluid to clear on its own. Oral pain relievers and ear drops help if there is pain. Decongestants and antihistamines sometimes help. Antibiotics don't help since there is no infection. Your doctor may prescribe a nasal spray to help reduce swelling in the nose and eustachian tube. This can allow the ear to drain.  If your OME doesn't improve after 3 months, surgery may be used to drain the fluid and insert a small tube in the eardrum to allow continued drainage.  Because the middle ear fluid can become infected, it is important to watch for signs of an ear infection which may develop later. These signs include increased ear pain, fever, or drainage from the ear.  Home care  The following guidelines will help you care for yourself at home:  · You may use  over-the-counter medicine as directed to control pain, unless another medicine was prescribed. If you have chronic liver or kidney disease or ever had a stomach ulcer or GI bleeding, talk with your doctor before using these medicines. Aspirin should never be used in anyone under 18 years of age who is ill with a fever. It may cause severe liver damage.  · You may use over-the-counter decongestants such as phenylephrine or pseudoephedrine. But they are not always helpful. Don't use nasal spray decongestants more than 3 days. Longer use can make congestion worse. Prescription nasal sprays from your doctor don't typically have those restrictions.  · Antihistamines may help if you are also having allergy symptoms.  · You may use medicines such as guaifenesin to thin mucus and promote drainage.  Follow-up care  Follow up with your healthcare provider or as advised if you are not feeling better after 3 days.  When to seek medical advice  Call your healthcare provider right away if any of the following occur:  · Your ear pain gets worse or does not start to improve   · Fever of 100.4°F (38°C) or higher, or as directed by your healthcare provider  · Fluid or blood draining from the ear  · Headache or sinus pain  · Stiff neck  · Unusual drowsiness or confusion  Date Last Reviewed: 10/1/2016  © 9576-8063 [employer], "[employer]. [address] [place] [address], [place] [address]. All rights reserved. This information is not intended as a substitute for professional medical care. Always follow your healthcare professional's instructions.

## 2019-08-22 NOTE — PATIENT INSTRUCTIONS
Follow up with your doctor in a few days.  Return to the urgent care or go to the ER if symptoms get worse.    You received a steroid shot today - this can elevate your blood pressure, elevate your blood sugar, water weight gain, nervous energy, redness to the face and dimpling of the skin where the shot goes in.       Start oral steroid tomorrow.  flonase twice a day followed by astalin spray twice daily.  Referral to ent placed with contact info for assistance out of [facility]. Please call 1 [phone] if you do not hear from [facility] to get your referral appointment we discussed.      Access Navigator team who handles Medicaid referrals [facility]. The main line for that team (for your referrals into [facility]) is [phone] Access Line - helps Medicaid patients to find Medicaid care OUTSIDE of [facility]. Medicaid Access Line contact is: [phone]?  [email or web address] - [phone] - a community line that can help refer       Follow up if not improving.  Phenergan for nausea/dizziness- will cause drowsiness.        Access Navigator team who handles Medicaid referrals [facility]. The main line for that team (for your referrals into [facility]) is [phone] Access Line - helps Medicaid patients to find Medicaid care OUTS[facility] of [facility]. Medicaid Access Line contact is: [phone]?  [email or web address] - [phone] - a community line that can help refer         Sinus Headaches     When using nasal spray, keep your chin down and angle the spray away from center.     Sinus headaches can cause a gnawing pain behind the nose and eyes. The pain most often gets worse in the afternoon and evening. You may also run a fever. Sinus headaches are caused by colds or allergies that make the nasal passages inflamed or infected.  To help prevent sinus headaches:  · Treat colds promptly to keep mucus from backing up.  · Avoid things that trigger sinus problems, such as pollens, dust, smoke, fumes, and strong odors.  · Take  allergy medicines as directed by your healthcare provider.  To relieve the pain:  · Keep your sinuses open and free of mucus. Try over-the-counter sinus rinse products.  · Use a nasal decongestant as directed to reduce the inflammation.  · Drink fluids to keep the mucus thinner. This helps it drain more easily. You can also use a humidifier.  · Apply hot packs to the area around your sinuses. Use a hot water bottle.  · See your healthcare provider if your sinus headache lasts more than 2 weeks. You may need medicine for a sinus infection or an exam to check for other headache conditions, like migraines.  Date Last Reviewed: 10/1/2016  © 1385-8569 [employer]. [address] [place] [address], [place] [address]. All rights reserved. This information is not intended as a substitute for professional medical care. Always follow your healthcare professional's instructions.        Earache, No Infection (Adult)  Earaches can happen without an infection. This occurs when air and fluid build up behind the eardrum causing a feeling of fullness and discomfort and reduced hearing. This is called otitis media with effusion (OME) or serous otitis media. It means there is fluid in the middle ear. It is not the same as acute otitis media, which is typically from infection.  OME can happen when you have a cold if congestion blocks the passage that drains the middle ear. This passage is called the eustachian tube. OME may also occur with nasal allergies or after a bacterial middle ear infection.    The pain or discomfort may come and go. You may hear clicking or popping sounds when you chew or swallow. You may feel that your balance is off. Or you may hear ringing in the ear.  It often takes from several weeks up to 3 months for the fluid to clear on its own. Oral pain relievers and ear drops help if there is pain. Decongestants and antihistamines sometimes help. Antibiotics don't help since there is no infection. Your doctor may  prescribe a nasal spray to help reduce swelling in the nose and eustachian tube. This can allow the ear to drain.  If your OME doesn't improve after 3 months, surgery may be used to drain the fluid and insert a small tube in the eardrum to allow continued drainage.  Because the middle ear fluid can become infected, it is important to watch for signs of an ear infection which may develop later. These signs include increased ear pain, fever, or drainage from the ear.  Home care  The following guidelines will help you care for yourself at home:  · You may use over-the-counter medicine as directed to control pain, unless another medicine was prescribed. If you have chronic liver or kidney disease or ever had a stomach ulcer or GI bleeding, talk with your doctor before using these medicines. Aspirin should never be used in anyone under 18 years of age who is ill with a fever. It may cause severe liver damage.  · You may use over-the-counter decongestants such as phenylephrine or pseudoephedrine. But they are not always helpful. Don't use nasal spray decongestants more than 3 days. Longer use can make congestion worse. Prescription nasal sprays from your doctor don't typically have those restrictions.  · Antihistamines may help if you are also having allergy symptoms.  · You may use medicines such as guaifenesin to thin mucus and promote drainage.  Follow-up care  Follow up with your healthcare provider or as advised if you are not feeling better after 3 days.  When to seek medical advice  Call your healthcare provider right away if any of the following occur:  · Your ear pain gets worse or does not start to improve   · Fever of 100.4°F (38°C) or higher, or as directed by your healthcare provider  · Fluid or blood draining from the ear  · Headache or sinus pain  · Stiff neck  · Unusual drowsiness or confusion  Date Last Reviewed: 10/1/2016  © 0546-8877 [employer]. [address] [place] [address], [place] [address].  All rights reserved. This information is not intended as a substitute for professional medical care. Always follow your healthcare professional's instructions.

## 2019-09-25 ENCOUNTER — OFFICE VISIT (OUTPATIENT)
Dept: OBSTETRICS AND GYNECOLOGY | Facility: CLINIC | Age: 44
End: 2019-09-25
Payer: MEDICAID

## 2019-09-25 ENCOUNTER — LAB VISIT (OUTPATIENT)
Dept: LAB | Facility: HOSPITAL | Age: 44
End: 2019-09-25
Attending: OBSTETRICS & GYNECOLOGY
Payer: MEDICAID

## 2019-09-25 VITALS
SYSTOLIC BLOOD PRESSURE: 132 MMHG | WEIGHT: 224.44 LBS | DIASTOLIC BLOOD PRESSURE: 78 MMHG | BODY MASS INDEX: 38.52 KG/M2

## 2019-09-25 DIAGNOSIS — Z01.419 WELL WOMAN EXAM WITH ROUTINE GYNECOLOGICAL EXAM: Primary | ICD-10-CM

## 2019-09-25 DIAGNOSIS — Z12.39 SCREENING FOR BREAST CANCER: ICD-10-CM

## 2019-09-25 DIAGNOSIS — Z01.419 WELL WOMAN EXAM WITH ROUTINE GYNECOLOGICAL EXAM: ICD-10-CM

## 2019-09-25 DIAGNOSIS — Z12.4 SCREENING FOR CERVICAL CANCER: ICD-10-CM

## 2019-09-25 PROBLEM — N93.9 ABNORMAL UTERINE BLEEDING (AUB): Status: RESOLVED | Noted: 2017-10-31 | Resolved: 2019-09-25

## 2019-09-25 PROBLEM — Z30.2 ENCOUNTER FOR STERILIZATION: Status: RESOLVED | Noted: 2017-10-31 | Resolved: 2019-09-25

## 2019-09-25 LAB
ALBUMIN SERPL BCP-MCNC: 4.1 G/DL (ref 3.5–5.2)
ALP SERPL-CCNC: 98 U/L (ref 55–135)
ALT SERPL W/O P-5'-P-CCNC: 20 U/L (ref 10–44)
ANION GAP SERPL CALC-SCNC: 8 MMOL/L (ref 8–16)
AST SERPL-CCNC: 22 U/L (ref 10–40)
BASOPHILS # BLD AUTO: 0.04 K/UL (ref 0–0.2)
BASOPHILS NFR BLD: 0.5 % (ref 0–1.9)
BILIRUB SERPL-MCNC: 0.4 MG/DL (ref 0.1–1)
BUN SERPL-MCNC: 8 MG/DL (ref 6–20)
CALCIUM SERPL-MCNC: 9.5 MG/DL (ref 8.7–10.5)
CHLORIDE SERPL-SCNC: 106 MMOL/L (ref 95–110)
CHOLEST SERPL-MCNC: 164 MG/DL (ref 120–199)
CHOLEST/HDLC SERPL: 3.5 {RATIO} (ref 2–5)
CO2 SERPL-SCNC: 24 MMOL/L (ref 23–29)
CREAT SERPL-MCNC: 0.7 MG/DL (ref 0.5–1.4)
DIFFERENTIAL METHOD: NORMAL
EOSINOPHIL # BLD AUTO: 0.2 K/UL (ref 0–0.5)
EOSINOPHIL NFR BLD: 2.5 % (ref 0–8)
ERYTHROCYTE [DISTWIDTH] IN BLOOD BY AUTOMATED COUNT: 12.9 % (ref 11.5–14.5)
EST. GFR  (AFRICAN AMERICAN): >60 ML/MIN/1.73 M^2
EST. GFR  (NON AFRICAN AMERICAN): >60 ML/MIN/1.73 M^2
ESTIMATED AVG GLUCOSE: 100 MG/DL (ref 68–131)
GLUCOSE SERPL-MCNC: 86 MG/DL (ref 70–110)
HBA1C MFR BLD HPLC: 5.1 % (ref 4–5.6)
HCT VFR BLD AUTO: 41.7 % (ref 37–48.5)
HDLC SERPL-MCNC: 47 MG/DL (ref 40–75)
HDLC SERPL: 28.7 % (ref 20–50)
HGB BLD-MCNC: 14.1 G/DL (ref 12–16)
LDLC SERPL CALC-MCNC: 98.2 MG/DL (ref 63–159)
LYMPHOCYTES # BLD AUTO: 1.9 K/UL (ref 1–4.8)
LYMPHOCYTES NFR BLD: 25.8 % (ref 18–48)
MCH RBC QN AUTO: 29.9 PG (ref 27–31)
MCHC RBC AUTO-ENTMCNC: 33.8 G/DL (ref 32–36)
MCV RBC AUTO: 89 FL (ref 82–98)
MONOCYTES # BLD AUTO: 0.4 K/UL (ref 0.3–1)
MONOCYTES NFR BLD: 5.5 % (ref 4–15)
NEUTROPHILS # BLD AUTO: 4.9 K/UL (ref 1.8–7.7)
NEUTROPHILS NFR BLD: 65.7 % (ref 38–73)
NONHDLC SERPL-MCNC: 117 MG/DL
PLATELET # BLD AUTO: 250 K/UL (ref 150–350)
PMV BLD AUTO: 10 FL (ref 9.2–12.9)
POTASSIUM SERPL-SCNC: 4 MMOL/L (ref 3.5–5.1)
PROT SERPL-MCNC: 7.8 G/DL (ref 6–8.4)
RBC # BLD AUTO: 4.71 M/UL (ref 4–5.4)
SODIUM SERPL-SCNC: 138 MMOL/L (ref 136–145)
TRIGL SERPL-MCNC: 94 MG/DL (ref 30–150)
TSH SERPL DL<=0.005 MIU/L-ACNC: 0.75 UIU/ML (ref 0.4–4)
WBC # BLD AUTO: 7.49 K/UL (ref 3.9–12.7)

## 2019-09-25 PROCEDURE — 84443 ASSAY THYROID STIM HORMONE: CPT

## 2019-09-25 PROCEDURE — 99999 PR PBB SHADOW E&M-EST. PATIENT-LVL III: ICD-10-PCS | Mod: PBBFAC,,, | Performed by: OBSTETRICS & GYNECOLOGY

## 2019-09-25 PROCEDURE — 99396 PR PREVENTIVE VISIT,EST,40-64: ICD-10-PCS | Mod: S$PBB,,, | Performed by: OBSTETRICS & GYNECOLOGY

## 2019-09-25 PROCEDURE — 85025 COMPLETE CBC W/AUTO DIFF WBC: CPT

## 2019-09-25 PROCEDURE — 99999 PR PBB SHADOW E&M-EST. PATIENT-LVL III: CPT | Mod: PBBFAC,,, | Performed by: OBSTETRICS & GYNECOLOGY

## 2019-09-25 PROCEDURE — 88175 CYTOPATH C/V AUTO FLUID REDO: CPT

## 2019-09-25 PROCEDURE — 80053 COMPREHEN METABOLIC PANEL: CPT

## 2019-09-25 PROCEDURE — 99213 OFFICE O/P EST LOW 20 MIN: CPT | Mod: PBBFAC,PO | Performed by: OBSTETRICS & GYNECOLOGY

## 2019-09-25 PROCEDURE — 99396 PREV VISIT EST AGE 40-64: CPT | Mod: S$PBB,,, | Performed by: OBSTETRICS & GYNECOLOGY

## 2019-09-25 PROCEDURE — 83036 HEMOGLOBIN GLYCOSYLATED A1C: CPT

## 2019-09-25 PROCEDURE — 36415 COLL VENOUS BLD VENIPUNCTURE: CPT

## 2019-09-25 PROCEDURE — 80061 LIPID PANEL: CPT

## 2019-09-25 NOTE — PROGRESS NOTES
GYNECOLOGY OFFICE NOTE    Reason for visit: annual    HPI: Pt is a 44 y.o.  female  who presents for annual. Cycle: menarche- 18, Interval-  Q month, Duration- 7 days, Flow- light s/p BTL/ablation denies dysmenorrhea. She is sexually active.  She uses bilateral tubal ligation for contraception.  She does not desire STI screening. She denies vaginal discharge.  Last pap: 2016, denies hx of abnormal. Last MMG 3/2019- negative.     Past Medical History:   Diagnosis Date    Abnormal Pap smear of cervix     rechecked and came back normal     Anemia     Endometriosis     Hypertension in pregnancy, preeclampsia, delivered        Past Surgical History:   Procedure Laterality Date    PELVIC LAPAROSCOPY      [facility] [place]- endometriosis    salpingectomy and endometrial ablation  2017       Family History   Problem Relation Age of Onset    Hypertension Father     Allergies Mother     Allergic rhinitis Mother     Allergies Sister     Allergic rhinitis Sister     Breast cancer Maternal Cousin     Breast cancer Maternal Cousin     Ovarian cancer Neg Hx     Cervical cancer Neg Hx     Endometrial cancer Neg Hx     Vaginal cancer Neg Hx     Angioedema Neg Hx     Asthma Neg Hx     Atopy Neg Hx     Eczema Neg Hx     Immunodeficiency Neg Hx     Rhinitis Neg Hx     Urticaria Neg Hx        Social History     Tobacco Use    Smoking status: Former Smoker     Packs/day: 0.10     Years: 26.00     Pack years: 2.60     Types: Cigarettes     Start date: 1993     Last attempt to quit: 4/10/2019     Years since quittin.4    Smokeless tobacco: Never Used   Substance Use Topics    Alcohol use: No     Alcohol/week: 0.0 standard drinks    Drug use: No       OB History    Para Term  AB Living   4 2 2 0 2 2   SAB TAB Ectopic Multiple Live Births     2     2      # Outcome Date GA Lbr Thee/2nd Weight Sex Delivery Anes PTL Lv   4 TAB            3 TAB            2 Term       Vag-Spont   RITO   1 Term      Vag-Spont   RITO      Obstetric Comments   8-20-15 episiotomy performed        Current Outpatient Medications   Medication Sig    albuterol (PROVENTIL HFA) 90 mcg/actuation inhaler Inhale 2 puffs into the lungs every 6 (six) hours as needed for Wheezing. Rescue    azelastine (ASTELIN) 137 mcg (0.1 %) nasal spray 1 spray (137 mcg total) by Nasal route 2 (two) times daily.    hydroquinone 4 % Crea APPLY AA BID    ketotifen (ZADITOR) 0.025 % (0.035 %) ophthalmic solution INT 1 GTT OU BID    pantoprazole (PROTONIX) 20 MG tablet Take 1 tablet (20 mg total) by mouth once daily.    tretinoin (RETIN-A) 0.05 % cream APPLY HS    triamcinolone (NASACORT) 55 mcg nasal inhaler USE 2 SPRAYS IN EACH NOSTRIL DAILY    benzoyl peroxide 5% 5 % gel CESAR TO THE AFFECTED AREA BID    clotrimazole (LOTRIMIN) 1 % cream CESAR TO THE AFFECTED AREA  QD TO BID    FLONASE ALLERGY RELIEF 50 mcg/actuation nasal spray INSTILL 2 SPRAY IN EACH NOSTRIL EVERY DAY (Patient not taking: Reported on 9/25/2019)    methylPREDNISolone (MEDROL DOSEPACK) 4 mg tablet use as directed (Patient not taking: Reported on 9/25/2019)    montelukast (SINGULAIR) 10 mg tablet     naproxen (NAPROSYN) 500 MG tablet Take 1 tablet (500 mg total) by mouth 2 (two) times daily with meals. (Patient not taking: Reported on 9/25/2019)     No current facility-administered medications for this visit.        Allergies: Aspirin     /78   Wt 101.8 kg (224 lb 6.9 oz)   LMP 09/01/2019 (Within Weeks)   BMI 38.52 kg/m²     ROS:  GENERAL: Denies fever or chills.   SKIN: Denies rash or lesions.   HEAD: Denies head injury or headache.   CHEST: Denies chest pain or shortness of breath.   CARDIOVASCULAR: Denies palpitations or chest pain.   ABDOMEN: No constipation, diarrhea, nausea, vomiting or rectal bleeding.   URINARY: No dysuria, hematuria, or burning on urination.  REPRODUCTIVE: See HPI.   BREASTS: see HPI  NEUROLOGIC: Denies syncope or  weakness.     Physical Exam:  GENERAL: alert, appears stated age and cooperative  NEUROLOGIC: orientated to person, place and time, normal mood and affect   CHEST: Normal respiratory effort  NECK: normal appearance  SKIN: no acne, hirsutism  BREAST EXAM: breasts appear normal, no suspicious masses, no skin or nipple changes or axillary nodes  ABDOMEN: abdomen is soft without significant tenderness, masses  EXTERNAL GENITALIA:  normal general appearance  URETHRA: normal urethra, normal urethral meatus  VAGINA:  normal without tenderness, induration or masses, cystocele/rectocele  CERVIX:  Nabothian cysts  UTERUS:  mobile, retroverted  ADNEXA:  normal adnexa, nontender and no masses    Diagnosis:  1. Well woman exam with routine gynecological exam    2. Screening for cervical cancer    3. Screening for breast cancer        Plan:   1. Annual- routine labs ordered  2. Pap  3. mmg    Orders Placed This Encounter    Mammo Digital Screening Bilat w/ Hi    CBC auto differential    Comprehensive metabolic panel    Lipid panel    TSH    Hemoglobin A1c    Liquid-based pap smear, screening       Patient was counseled today on the new ACS guidelines for cervical cytology screening as well as the current recommendations for breast cancer screening. She was counseled to follow up with her PCP for other routine health maintenance.     Follow up in about 1 year (around 9/25/2020) for annual.      Adriane Patel MD  OB/GYN  Pager: [phone]

## 2019-12-25 NOTE — TELEPHONE ENCOUNTER
Contacted patient to see if she was going to make it to her appointment. Patient did not answer. Patient needs to reschedule her appointment.    Patient complained of pain from his abdominal area. Medicated with Morphine 2mg IVP x1 which offered relief . Hematoma on abdominal area and scrotal area remains. Patient stated discomfort on his abdomen when he is coughing.  Encouraged to splint abdomen wh needed  12/25/2019 0425 by Cinthya Hung RN  Outcome: Progressing    Problem: PAIN - ADULT  Goal: Verbalizes/displays adequate comfort level or patient's stated pain goal  Description  INTERVENTIONS:  - Encourage pt to monitor pain and request assistance

## 2019-12-27 ENCOUNTER — CLINICAL SUPPORT (OUTPATIENT)
Dept: SMOKING CESSATION | Facility: CLINIC | Age: 44
End: 2019-12-27
Payer: COMMERCIAL

## 2019-12-27 ENCOUNTER — TELEPHONE (OUTPATIENT)
Dept: SMOKING CESSATION | Facility: CLINIC | Age: 44
End: 2019-12-27

## 2019-12-27 DIAGNOSIS — F17.200 NICOTINE DEPENDENCE: Primary | ICD-10-CM

## 2019-12-27 PROCEDURE — 99407 PR TOBACCO USE CESSATION INTENSIVE >10 MINUTES: ICD-10-PCS | Mod: S$GLB,,,

## 2019-12-27 PROCEDURE — 99407 BEHAV CHNG SMOKING > 10 MIN: CPT | Mod: S$GLB,,,

## 2019-12-27 NOTE — PROGRESS NOTES
Successful contact with patient regarding tobacco cessation quit #1. Pt states, she continue to smoke 2-3 cigarettes per day and she's ready to return to the program. Pt scheduled for quit #2 on 12/30/2019.  Pt informed of her benefit status, future telephone follow ups, and contact information. Will update the tobacco cessation smart form for 3-6 months on quit #1.

## 2020-01-03 ENCOUNTER — CLINICAL SUPPORT (OUTPATIENT)
Dept: SMOKING CESSATION | Facility: CLINIC | Age: 45
End: 2020-01-03
Payer: COMMERCIAL

## 2020-01-03 DIAGNOSIS — F17.200 NICOTINE DEPENDENCE: Primary | ICD-10-CM

## 2020-01-03 PROCEDURE — 99999 PR PBB SHADOW E&M-EST. PATIENT-LVL III: ICD-10-PCS | Mod: PBBFAC,,,

## 2020-01-03 PROCEDURE — 99999 PR PBB SHADOW E&M-EST. PATIENT-LVL III: CPT | Mod: PBBFAC,,,

## 2020-01-03 PROCEDURE — 99404 PREV MED CNSL INDIV APPRX 60: CPT | Mod: S$GLB,,, | Performed by: GENERAL PRACTICE

## 2020-01-03 PROCEDURE — 99404 PR PREVENT COUNSEL,INDIV,60 MIN: ICD-10-PCS | Mod: S$GLB,,, | Performed by: GENERAL PRACTICE

## 2020-01-03 RX ORDER — DIPHENHYDRAMINE HCL 25 MG
CAPSULE ORAL
Qty: 110 EACH | Refills: 0 | Status: SHIPPED | OUTPATIENT
Start: 2020-01-03 | End: 2020-05-19 | Stop reason: SDUPTHER

## 2020-01-03 NOTE — Clinical Note
Patient will be participating in weekly tobacco cessation meetings and will begin the prescribed tobacco cessation medication regime of the 4 mg gum. Patient has used Wellbutrin and gum before.

## 2020-01-10 ENCOUNTER — OFFICE VISIT (OUTPATIENT)
Dept: URGENT CARE | Facility: CLINIC | Age: 45
End: 2020-01-10
Payer: MEDICAID

## 2020-01-10 VITALS
TEMPERATURE: 100 F | BODY MASS INDEX: 38.24 KG/M2 | SYSTOLIC BLOOD PRESSURE: 130 MMHG | OXYGEN SATURATION: 99 % | DIASTOLIC BLOOD PRESSURE: 89 MMHG | RESPIRATION RATE: 18 BRPM | HEART RATE: 105 BPM | HEIGHT: 64 IN | WEIGHT: 224 LBS

## 2020-01-10 DIAGNOSIS — J06.9 VIRAL URI: Primary | ICD-10-CM

## 2020-01-10 DIAGNOSIS — R68.83 CHILLS: ICD-10-CM

## 2020-01-10 DIAGNOSIS — R68.89 FLU-LIKE SYMPTOMS: ICD-10-CM

## 2020-01-10 LAB
CTP QC/QA: YES
FLUAV AG NPH QL: NEGATIVE
FLUBV AG NPH QL: NEGATIVE

## 2020-01-10 PROCEDURE — 87804 INFLUENZA ASSAY W/OPTIC: CPT | Mod: QW,S$GLB,, | Performed by: NURSE PRACTITIONER

## 2020-01-10 PROCEDURE — 99214 PR OFFICE/OUTPT VISIT, EST, LEVL IV, 30-39 MIN: ICD-10-PCS | Mod: 25,S$GLB,, | Performed by: NURSE PRACTITIONER

## 2020-01-10 PROCEDURE — 87804 POCT INFLUENZA A/B: ICD-10-PCS | Mod: QW,S$GLB,, | Performed by: NURSE PRACTITIONER

## 2020-01-10 PROCEDURE — 99214 OFFICE O/P EST MOD 30 MIN: CPT | Mod: 25,S$GLB,, | Performed by: NURSE PRACTITIONER

## 2020-01-10 RX ORDER — OSELTAMIVIR PHOSPHATE 75 MG/1
75 CAPSULE ORAL 2 TIMES DAILY
Qty: 10 CAPSULE | Refills: 0 | Status: SHIPPED | OUTPATIENT
Start: 2020-01-10 | End: 2020-08-31

## 2020-01-10 NOTE — PATIENT INSTRUCTIONS
Follow up with your doctor in a few days.  Return to the urgent care or go to the ER if symptoms get worse.    Contagious for 5 days.  Alternate Tylenol and Ibuprofen every 3 hrs  salt water gargles to soothe throat  Honey/lemon water to soothe throat  Cold-eeze helps to reduce the duration of URI symptoms  Elderberry to reduce duration of URI symptoms  Cepachol helps to numb the discomfort in throat  Nasal saline spray reduces inflammation and dryness  Warm face compresses/hot showers as often as you can to open sinuses   Vicks vapor rub at night  Flonase OTC or Nasacort OTC  Simple foods like chicken noodle soup help hydrate  Delsym helps with coughing at night  Zyrtec/Claritin during the day and Benadryl at night may help if allergy component   Zantac will help if there is reflux from the post nasal drip  Rest as much as you can  Your symptoms will likely last 5-7 days, maybe longer depending on how it affects your body.  You are contagious 5-7, so minimize contact with others to reduce the spread to others and stay home from work or school as we discussed. Dehydration is preventable but is one of the main reasons why you will feel so badly. Drink pedialyte, gatorade or propel. Stay hydrated.  Antibiotics are not needed unless a complication(such as Otitis Media, Bacterial sinus infection or pneumonia). Taking antibiotics for Flu/Cold is not supported by evidencebased medicine and can expose you to unnecessary side effects of the medication, such as anaphylaxis.   If you experience any:  Chest pain, shortness of breath, wheezing or difficulty breathing  Severe headache, face, neck or ear pain  New rash  Fever over 101.5º F (38.6 C) for more than three days  Confusion, behavior change or seizure  Severe weakness or dizziness  Go to ER        Viral Upper Respiratory Illness (Adult)  You have a viral upper respiratory illness (URI), which is another term for the common cold. This illness is contagious during the  first few days. It is spread through the air by coughing and sneezing. It may also be spread by direct contact (touching the sick person and then touching your own eyes, nose, or mouth). Frequent handwashing will decrease risk of spread. Most viral illnesses go away within 7 to 10 days with rest and simple home remedies. Sometimes the illness may last for several weeks. Antibiotics will not kill a virus, and they are generally not prescribed for this condition.    Home care  · If symptoms are severe, rest at home for the first 2 to 3 days. When you resume activity, don't let yourself get too tired.  · Avoid being exposed to cigarette smoke (yours or others).  · You may use acetaminophen or ibuprofen to control pain and fever, unless another medicine was prescribed. (Note: If you have chronic liver or kidney disease, have ever had a stomach ulcer or gastrointestinal bleeding, or are taking blood-thinning medicines, talk with your healthcare provider before using these medicines.) Aspirin should never be given to anyone under 18 years of age who is ill with a viral infection or fever. It may cause severe liver or brain damage.  · Your appetite may be poor, so a light diet is fine. Avoid dehydration by drinking 6 to 8 glasses of fluids per day (water, soft drinks, juices, tea, or soup). Extra fluids will help loosen secretions in the nose and lungs.  · Over-the-counter cold medicines will not shorten the length of time youre sick, but they may be helpful for the following symptoms: cough, sore throat, and nasal and sinus congestion. (Note: Do not use decongestants if you have high blood pressure.)  Follow-up care  Follow up with your healthcare provider, or as advised.  When to seek medical advice  Call your healthcare provider right away if any of these occur:  · Cough with lots of colored sputum (mucus)  · Severe headache; face, neck, or ear pain  · Difficulty swallowing due to throat pain  · Fever of 100.4°F  (38°C)  Call 911, or get immediate medical care  Call emergency services right away if any of these occur:  · Chest pain, shortness of breath, wheezing, or difficulty breathing  · Coughing up blood  · Inability to swallow due to throat pain  Date Last Reviewed: 9/13/2015  © 4046-3271 [employer]. [address] [place] [address], [place] [address]. All rights reserved. This information is not intended as a substitute for professional medical care. Always follow your healthcare professional's instructions.

## 2020-01-10 NOTE — PROGRESS NOTES
"Subjective:       Patient ID: Caridad De La Garza is a 44 y.o. female.    Vitals:  height is 5' 4" (1.626 m) and weight is 101.6 kg (224 lb). Her temperature is 100 °F (37.8 °C). Her blood pressure is 130/89 and her pulse is 105. Her respiration is 18 and oxygen saturation is 99%.     Chief Complaint: Chills    Pt experiencing chills x 2 days. Pt experiencing sinus pressure, eye burning, dry mouth, and feverish. Pt took Vicks Cold and Flu -- relieved some body aches.     Sinus Problem   This is a new problem. The current episode started yesterday. The problem has been resolved since onset. The maximum temperature recorded prior to her arrival was 100.4 - 100.9 F. Associated symptoms include chills and sinus pressure. Pertinent negatives include no coughing, shortness of breath or sore throat. Treatments tried: Vicks Cold and Flu. The treatment provided mild relief.       Constitution: Positive for chills. Negative for fatigue.   HENT: Positive for sinus pressure. Negative for sore throat.    Neck: Negative for painful lymph nodes.   Cardiovascular: Negative for leg swelling.   Eyes: Negative for double vision and blurred vision.   Respiratory: Negative for cough and shortness of breath.    Genitourinary: Negative for frequency, urgency and history of kidney stones.   Musculoskeletal: Negative for joint pain, joint swelling, muscle cramps and muscle ache.   Skin: Negative for color change, pale and bruising.   Allergic/Immunologic: Negative for seasonal allergies.   Neurological: Negative for dizziness, history of vertigo, light-headedness and passing out.   Hematologic/Lymphatic: Negative for swollen lymph nodes.   Psychiatric/Behavioral: Negative for nervous/anxious, sleep disturbance and depression. The patient is not nervous/anxious.        Objective:      Physical Exam   Constitutional: She is oriented to person, place, and time. She appears well-developed and well-nourished. She is cooperative.  Non-toxic " appearance. She has a sickly appearance. She does not appear ill. No distress.   HENT:   Head: Normocephalic and atraumatic.   Right Ear: Hearing, tympanic membrane, external ear and ear canal normal.   Left Ear: Hearing, tympanic membrane, external ear and ear canal normal.   Nose: Mucosal edema present. No rhinorrhea or nasal deformity. No epistaxis. Right sinus exhibits no maxillary sinus tenderness and no frontal sinus tenderness. Left sinus exhibits no maxillary sinus tenderness and no frontal sinus tenderness.   Mouth/Throat: Uvula is midline and mucous membranes are normal. No trismus in the jaw. Normal dentition. No uvula swelling. Posterior oropharyngeal erythema present. No oropharyngeal exudate or posterior oropharyngeal edema.   Eyes: Conjunctivae and lids are normal. No scleral icterus.   Neck: Trachea normal, full passive range of motion without pain and phonation normal. Neck supple. No neck rigidity. No edema and no erythema present.   Cardiovascular: Normal rate, regular rhythm, normal heart sounds, intact distal pulses and normal pulses.   Pulses:       Radial pulses are 2+ on the right side, and 2+ on the left side.   Pulmonary/Chest: Effort normal and breath sounds normal. No stridor. No respiratory distress. She has no decreased breath sounds. She has no wheezes. She has no rhonchi.   Abdominal: Normal appearance.   Musculoskeletal: Normal range of motion. She exhibits no edema or deformity.   Lymphadenopathy:     She has cervical adenopathy.   Neurological: She is alert and oriented to person, place, and time. She exhibits normal muscle tone. Coordination normal.   Skin: Skin is warm, dry, intact, not diaphoretic and not pale.   Psychiatric: She has a normal mood and affect. Her speech is normal and behavior is normal. Judgment and thought content normal. Cognition and memory are normal.   Nursing note and vitals reviewed.    Results for orders placed or performed in visit on 01/10/20   POCT  Influenza A/B   Result Value Ref Range    Rapid Influenza A Ag Negative Negative    Rapid Influenza B Ag Negative Negative     Acceptable Yes            Assessment:       1. Viral URI    2. Chills    3. Flu-like symptoms        Plan:     discussed exposure to flu, flu like. Antiviral risk/benefit discussed and provided.  Symptomatic trmt and close f/u if not improving.    Viral URI    Chills  -     POCT Influenza A/B    Flu-like symptoms  -     oseltamivir (TAMIFLU) 75 MG capsule; Take 1 capsule (75 mg total) by mouth 2 (two) times daily.  Dispense: 10 capsule; Refill: 0      Patient Instructions   Follow up with your doctor in a few days.  Return to the urgent care or go to the ER if symptoms get worse.    Contagious for 5 days.  Alternate Tylenol and Ibuprofen every 3 hrs  salt water gargles to soothe throat  Honey/lemon water to soothe throat  Cold-eeze helps to reduce the duration of URI symptoms  Elderberry to reduce duration of URI symptoms  Cepachol helps to numb the discomfort in throat  Nasal saline spray reduces inflammation and dryness  Warm face compresses/hot showers as often as you can to open sinuses   Vicks vapor rub at night  Flonase OTC or Nasacort OTC  Simple foods like chicken noodle soup help hydrate  Delsym helps with coughing at night  Zyrtec/Claritin during the day and Benadryl at night may help if allergy component   Zantac will help if there is reflux from the post nasal drip  Rest as much as you can  Your symptoms will likely last 5-7 days, maybe longer depending on how it affects your body.  You are contagious 5-7, so minimize contact with others to reduce the spread to others and stay home from work or school as we discussed. Dehydration is preventable but is one of the main reasons why you will feel so badly. Drink pedialyte, gatorade or propel. Stay hydrated.  Antibiotics are not needed unless a complication(such as Otitis Media, Bacterial sinus infection or pneumonia).  Taking antibiotics for Flu/Cold is not supported by evidencebased medicine and can expose you to unnecessary side effects of the medication, such as anaphylaxis.   If you experience any:  Chest pain, shortness of breath, wheezing or difficulty breathing  Severe headache, face, neck or ear pain  New rash  Fever over 101.5º F (38.6 C) for more than three days  Confusion, behavior change or seizure  Severe weakness or dizziness  Go to ER        Viral Upper Respiratory Illness (Adult)  You have a viral upper respiratory illness (URI), which is another term for the common cold. This illness is contagious during the first few days. It is spread through the air by coughing and sneezing. It may also be spread by direct contact (touching the sick person and then touching your own eyes, nose, or mouth). Frequent handwashing will decrease risk of spread. Most viral illnesses go away within 7 to 10 days with rest and simple home remedies. Sometimes the illness may last for several weeks. Antibiotics will not kill a virus, and they are generally not prescribed for this condition.    Home care  · If symptoms are severe, rest at home for the first 2 to 3 days. When you resume activity, don't let yourself get too tired.  · Avoid being exposed to cigarette smoke (yours or others).  · You may use acetaminophen or ibuprofen to control pain and fever, unless another medicine was prescribed. (Note: If you have chronic liver or kidney disease, have ever had a stomach ulcer or gastrointestinal bleeding, or are taking blood-thinning medicines, talk with your healthcare provider before using these medicines.) Aspirin should never be given to anyone under 18 years of age who is ill with a viral infection or fever. It may cause severe liver or brain damage.  · Your appetite may be poor, so a light diet is fine. Avoid dehydration by drinking 6 to 8 glasses of fluids per day (water, soft drinks, juices, tea, or soup). Extra fluids will help  loosen secretions in the nose and lungs.  · Over-the-counter cold medicines will not shorten the length of time youre sick, but they may be helpful for the following symptoms: cough, sore throat, and nasal and sinus congestion. (Note: Do not use decongestants if you have high blood pressure.)  Follow-up care  Follow up with your healthcare provider, or as advised.  When to seek medical advice  Call your healthcare provider right away if any of these occur:  · Cough with lots of colored sputum (mucus)  · Severe headache; face, neck, or ear pain  · Difficulty swallowing due to throat pain  · Fever of 100.4°F (38°C)  Call 911, or get immediate medical care  Call emergency services right away if any of these occur:  · Chest pain, shortness of breath, wheezing, or difficulty breathing  · Coughing up blood  · Inability to swallow due to throat pain  Date Last Reviewed: 9/13/2015  © 0923-3558 [employer] [place]. [address] [place] [address], [place] [address]. All rights reserved. This information is not intended as a substitute for professional medical care. Always follow your healthcare professional's instructions.

## 2020-01-10 NOTE — LETTER
January 10, 2020      [facility] [address], [address]  [facility] [address]  Phone: [phone]  Fax: [phone]       Patient: Caridad De La Garza   YOB: 1975  Date of Visit: 01/10/2020    To Whom It May Concern:    Satish De La Garza  was at [facility] on 01/10/2020. She may return to work/school on 1/13/2020 with no restrictions. If you have any questions or concerns, or if I can be of further assistance, please do not hesitate to contact me.    Sincerely,        Eneida Martínez NP

## 2020-01-13 NOTE — ANESTHESIA POSTPROCEDURE EVALUATION
"Anesthesia Post Evaluation    Patient: Caridad De La Garza    Procedure(s) Performed: Procedure(s) (LRB):  ABLATION-ENDOMETRIAL (N/A)  [id number] (N/A)  SALPINGECTOMY-LAPAROSCOPIC (Bilateral)    Final Anesthesia Type: general  Patient location during evaluation: PACU  Patient participation: Yes- Able to Participate  Level of consciousness: awake and alert  Post-procedure vital signs: reviewed and stable  Pain management: adequate  Airway patency: patent  PONV status at discharge: No PONV  Anesthetic complications: no      Cardiovascular status: blood pressure returned to baseline  Respiratory status: unassisted  Hydration status: euvolemic  Follow-up not needed.        Visit Vitals  /71   Pulse 78   Temp 36.5 °C (97.7 °F) (Oral)   Resp 18   Ht 5' 4.5" (1.638 m)   Wt 91.6 kg (201 lb 15.1 oz)   LMP 10/23/2017 (Exact Date)   SpO2 97%   BMI 34.13 kg/m²       Pain/Lexis Score: Pain Assessment Performed: Yes (11/3/2017 11:00 AM)  Presence of Pain: denies (11/3/2017 11:00 AM)  Pain Rating Prior to Med Admin: 6 (11/3/2017  9:15 AM)  Lexis Score: 10 (11/3/2017 11:00 AM)      " Next visit: 5/6/20  Last visit: 11/6/19  Last refill: 9/11/19

## 2020-01-17 ENCOUNTER — TELEPHONE (OUTPATIENT)
Dept: SMOKING CESSATION | Facility: CLINIC | Age: 45
End: 2020-01-17

## 2020-01-17 NOTE — TELEPHONE ENCOUNTER
I called patient to reschedule her follow up as she was a no show but no answer and could not leave a message.

## 2020-03-13 ENCOUNTER — HOSPITAL ENCOUNTER (OUTPATIENT)
Dept: RADIOLOGY | Facility: HOSPITAL | Age: 45
Discharge: HOME | End: 2020-03-13
Attending: OBSTETRICS & GYNECOLOGY
Payer: MEDICAID

## 2020-03-13 VITALS — HEIGHT: 64 IN | WEIGHT: 224 LBS | BODY MASS INDEX: 38.24 KG/M2

## 2020-03-13 DIAGNOSIS — Z12.31 SCREENING MAMMOGRAM, ENCOUNTER FOR: ICD-10-CM

## 2020-03-13 PROCEDURE — 77067 MAMMO DIGITAL SCREENING BILAT WITH TOMOSYNTHESIS_CAD: ICD-10-PCS | Mod: 26,,, | Performed by: RADIOLOGY

## 2020-03-13 PROCEDURE — 77067 SCR MAMMO BI INCL CAD: CPT | Mod: TC,PN

## 2020-03-13 PROCEDURE — 77063 BREAST TOMOSYNTHESIS BI: CPT | Mod: 26,,, | Performed by: RADIOLOGY

## 2020-03-13 PROCEDURE — 77063 MAMMO DIGITAL SCREENING BILAT WITH TOMOSYNTHESIS_CAD: ICD-10-PCS | Mod: 26,,, | Performed by: RADIOLOGY

## 2020-03-13 PROCEDURE — 77067 SCR MAMMO BI INCL CAD: CPT | Mod: 26,,, | Performed by: RADIOLOGY

## 2020-03-19 ENCOUNTER — TELEPHONE (OUTPATIENT)
Dept: SMOKING CESSATION | Facility: CLINIC | Age: 45
End: 2020-03-19

## 2020-03-30 ENCOUNTER — CLINICAL SUPPORT (OUTPATIENT)
Dept: SMOKING CESSATION | Facility: CLINIC | Age: 45
End: 2020-03-30
Payer: COMMERCIAL

## 2020-03-30 DIAGNOSIS — F17.200 NICOTINE DEPENDENCE: Primary | ICD-10-CM

## 2020-03-30 PROCEDURE — 99407 PR TOBACCO USE CESSATION INTENSIVE >10 MINUTES: ICD-10-PCS | Mod: S$GLB,,,

## 2020-03-30 PROCEDURE — 99407 BEHAV CHNG SMOKING > 10 MIN: CPT | Mod: S$GLB,,,

## 2020-03-30 NOTE — PROGRESS NOTES
Spoke with patient today in regard to smoking cessation progress for 12 month telephone follow up on quit 1 and 3 month on Quit 2. Patient states that she is not tobacco free at this time. Not interested in making an appointment at this dealing with some stress issues.She stated that she is currently smoking 2 cigarettes in the evening.  Informed patient of benefit period, future follow up, and contact information if any further help or support is needed. Will complete / resolve smart form for 12 month follow up on Quit attempt #1 and 3 month smart form for quit 2..

## 2020-05-19 ENCOUNTER — CLINICAL SUPPORT (OUTPATIENT)
Dept: SMOKING CESSATION | Facility: CLINIC | Age: 45
End: 2020-05-19
Payer: COMMERCIAL

## 2020-05-19 DIAGNOSIS — F17.200 NICOTINE DEPENDENCE: Primary | ICD-10-CM

## 2020-05-19 PROCEDURE — 99999 PR PBB SHADOW E&M-EST. PATIENT-LVL III: ICD-10-PCS | Mod: PBBFAC,,,

## 2020-05-19 PROCEDURE — 99404 PR PREVENT COUNSEL,INDIV,60 MIN: ICD-10-PCS | Mod: S$GLB,,, | Performed by: GENERAL PRACTICE

## 2020-05-19 PROCEDURE — 99999 PR PBB SHADOW E&M-EST. PATIENT-LVL III: CPT | Mod: PBBFAC,,,

## 2020-05-19 PROCEDURE — 99404 PREV MED CNSL INDIV APPRX 60: CPT | Mod: S$GLB,,, | Performed by: GENERAL PRACTICE

## 2020-05-19 RX ORDER — DIPHENHYDRAMINE HCL 25 MG
CAPSULE ORAL
Qty: 110 EACH | Refills: 0 | Status: SHIPPED | OUTPATIENT
Start: 2020-05-19 | End: 2020-07-08 | Stop reason: SDUPTHER

## 2020-05-19 RX ORDER — BUPROPION HYDROCHLORIDE 150 MG/1
TABLET, EXTENDED RELEASE ORAL
Qty: 53 TABLET | Refills: 0 | Status: SHIPPED | OUTPATIENT
Start: 2020-05-19 | End: 2020-07-08 | Stop reason: DRUGHIGH

## 2020-05-19 NOTE — Clinical Note
Patient will be participating in weekly tobacco cessation meetings and will begin the prescribed tobacco cessation medication regime of the Wellbutrin and 4 mg gum. Patient has used Wellbutrin, patches and gum before.

## 2020-06-12 ENCOUNTER — CLINICAL SUPPORT (OUTPATIENT)
Dept: SMOKING CESSATION | Facility: CLINIC | Age: 45
End: 2020-06-12
Payer: COMMERCIAL

## 2020-06-12 DIAGNOSIS — F17.200 NICOTINE DEPENDENCE: Primary | ICD-10-CM

## 2020-06-12 PROCEDURE — 99402 PR PREVENT COUNSEL,INDIV,30 MIN: ICD-10-PCS | Mod: S$GLB,,, | Performed by: GENERAL PRACTICE

## 2020-06-12 PROCEDURE — 99999 PR PBB SHADOW E&M-EST. PATIENT-LVL III: CPT | Mod: PBBFAC,,,

## 2020-06-12 PROCEDURE — 99402 PREV MED CNSL INDIV APPRX 30: CPT | Mod: S$GLB,,, | Performed by: GENERAL PRACTICE

## 2020-06-12 PROCEDURE — 99999 PR PBB SHADOW E&M-EST. PATIENT-LVL III: ICD-10-PCS | Mod: PBBFAC,,,

## 2020-06-12 NOTE — Clinical Note
Patient is currently smoking 1 cpd and states she went several days without a cigarette. Patient is taking Wellbutrin and gum with no negative side effects at this time. We discussed at great length strategies to achieve and maintain her quit. I will follow up with patient via telephone in 2 weeks.

## 2020-06-12 NOTE — PROGRESS NOTES
Individual Follow-Up Form    6/12/2020    Quit Date:     Clinical Status of Patient: Outpatient    Length of Service: 30 minutes    Continuing Medication: yes  Wellbutrin    Other Medications: gum     Target Symptoms: Withdrawal and medication side effects. The following were  rated moderate (3) to severe (4) on TCRS:  · Moderate (3): none  · Severe (4): none    Comments: Patient is currently smoking 1 cpd and states she went several days without a cigarette. Patient is taking Wellbutrin and gum with no negative side effects at this time. We discussed at great length strategies to achieve and maintain her quit. I will follow up with patient via telephone in 2 weeks.    Diagnosis: F17.200    Next Visit: 2 weeks

## 2020-07-07 ENCOUNTER — TELEPHONE (OUTPATIENT)
Dept: SMOKING CESSATION | Facility: CLINIC | Age: 45
End: 2020-07-07

## 2020-07-07 NOTE — TELEPHONE ENCOUNTER
I called patient for follow up but she was unable to talk at this time. Patient will call me back.

## 2020-07-08 ENCOUNTER — CLINICAL SUPPORT (OUTPATIENT)
Dept: SMOKING CESSATION | Facility: CLINIC | Age: 45
End: 2020-07-08
Payer: COMMERCIAL

## 2020-07-08 DIAGNOSIS — F17.200 NICOTINE DEPENDENCE: Primary | ICD-10-CM

## 2020-07-08 PROCEDURE — 99999 PR PBB SHADOW E&M-EST. PATIENT-LVL III: CPT | Mod: PBBFAC,,,

## 2020-07-08 PROCEDURE — 99999 PR PBB SHADOW E&M-EST. PATIENT-LVL III: ICD-10-PCS | Mod: PBBFAC,,,

## 2020-07-08 PROCEDURE — 99402 PR PREVENT COUNSEL,INDIV,30 MIN: ICD-10-PCS | Mod: S$GLB,,, | Performed by: GENERAL PRACTICE

## 2020-07-08 PROCEDURE — 99402 PREV MED CNSL INDIV APPRX 30: CPT | Mod: S$GLB,,, | Performed by: GENERAL PRACTICE

## 2020-07-08 RX ORDER — BUPROPION HYDROCHLORIDE 150 MG/1
150 TABLET, EXTENDED RELEASE ORAL 2 TIMES DAILY
Qty: 60 TABLET | Refills: 0 | Status: SHIPPED | OUTPATIENT
Start: 2020-07-08 | End: 2020-07-08

## 2020-07-08 RX ORDER — BUPROPION HYDROCHLORIDE 150 MG/1
150 TABLET, EXTENDED RELEASE ORAL 2 TIMES DAILY
Qty: 60 TABLET | Refills: 0 | Status: SHIPPED | OUTPATIENT
Start: 2020-07-08 | End: 2020-08-14 | Stop reason: SDUPTHER

## 2020-07-08 RX ORDER — DIPHENHYDRAMINE HCL 25 MG
CAPSULE ORAL
Qty: 100 EACH | Refills: 0 | Status: SHIPPED | OUTPATIENT
Start: 2020-07-08 | End: 2021-04-27

## 2020-07-08 NOTE — Clinical Note
Patient is currently tobacco free and taking Wellbutrin and gum with no negative side effects at this time. I will refill both at this time. We discussed strategies to maintain her quit and new habits at great length.

## 2020-07-08 NOTE — PROGRESS NOTES
Individual Follow-Up Form    7/8/2020    Quit Date: 7/1/20    Clinical Status of Patient: Outpatient    Length of Service: 30 minutes    Continuing Medication: yes  Wellbutrin    Other Medications: gum     Target Symptoms: Withdrawal and medication side effects. The following were  rated moderate (3) to severe (4) on TCRS:  · Moderate (3): none  · Severe (4): none    Comments: Patient is currently tobacco free and taking Wellbutrin and gum with no negative side effects at this time. I will refill both at this time. We discussed strategies to maintain her quit and new habits at great length.     Diagnosis: F17.200    Next Visit: 1 week

## 2020-07-17 ENCOUNTER — CLINICAL SUPPORT (OUTPATIENT)
Dept: SMOKING CESSATION | Facility: CLINIC | Age: 45
End: 2020-07-17
Payer: COMMERCIAL

## 2020-07-17 DIAGNOSIS — F17.200 NICOTINE DEPENDENCE: Primary | ICD-10-CM

## 2020-07-17 PROCEDURE — 99999 PR PBB SHADOW E&M-EST. PATIENT-LVL III: ICD-10-PCS | Mod: PBBFAC,,,

## 2020-07-17 PROCEDURE — 99999 PR PBB SHADOW E&M-EST. PATIENT-LVL III: CPT | Mod: PBBFAC,,,

## 2020-07-17 PROCEDURE — 99402 PREV MED CNSL INDIV APPRX 30: CPT | Mod: S$GLB,,, | Performed by: GENERAL PRACTICE

## 2020-07-17 PROCEDURE — 99402 PR PREVENT COUNSEL,INDIV,30 MIN: ICD-10-PCS | Mod: S$GLB,,, | Performed by: GENERAL PRACTICE

## 2020-07-17 NOTE — PROGRESS NOTES
Individual Follow-Up Form    7/17/2020    Quit Date: 7/1/20    Clinical Status of Patient: Outpatient    Length of Service: 30 minutes    Continuing Medication: yes  Wellbutrin    Other Medications:      Target Symptoms: Withdrawal and medication side effects. The following were  rated moderate (3) to severe (4) on TCRS:  · Moderate (3): none  · Severe (4): none    Comments: Patient remains tobacco free and taking Wellbutrin with no negative side effects at this time. Patient no longer using the NRT gum as she is having surgery Monday and was instructed to stop using. We discussed at great length session 2 material concerning health and nicotine dependence. Patient is very pleased at how much better she feels since she quit smoking and will use strategies we discussed to help maintain her quit. I will follow up in 2 weeks.    Diagnosis: F17.200    Next Visit: 2 weeks

## 2020-07-17 NOTE — Clinical Note
Patient remains tobacco free and taking Wellbutrin with no negative side effects at this time. Patient no longer using the NRT gum as she is having surgery Monday and was instructed to stop using. We discussed at great length session 2 material concerning health and nicotine dependence. Patient is very pleased at how much better she feels since she quit smoking and will use strategies we discussed to help maintain her quit. I will follow up in 2 weeks.

## 2020-07-31 ENCOUNTER — TELEPHONE (OUTPATIENT)
Dept: SMOKING CESSATION | Facility: CLINIC | Age: 45
End: 2020-07-31

## 2020-08-06 ENCOUNTER — TELEPHONE (OUTPATIENT)
Dept: OBSTETRICS AND GYNECOLOGY | Facility: CLINIC | Age: 45
End: 2020-08-06

## 2020-08-06 ENCOUNTER — CLINICAL SUPPORT (OUTPATIENT)
Dept: SMOKING CESSATION | Facility: CLINIC | Age: 45
End: 2020-08-06
Payer: COMMERCIAL

## 2020-08-06 DIAGNOSIS — F17.200 NICOTINE DEPENDENCE: Primary | ICD-10-CM

## 2020-08-06 PROCEDURE — 99407 PR TOBACCO USE CESSATION INTENSIVE >10 MINUTES: ICD-10-PCS | Mod: S$GLB,,, | Performed by: GENERAL PRACTICE

## 2020-08-06 PROCEDURE — 99999 PR PBB SHADOW E&M-EST. PATIENT-LVL III: CPT | Mod: PBBFAC,,,

## 2020-08-06 PROCEDURE — 99407 BEHAV CHNG SMOKING > 10 MIN: CPT | Mod: S$GLB,,, | Performed by: GENERAL PRACTICE

## 2020-08-06 PROCEDURE — 99999 PR PBB SHADOW E&M-EST. PATIENT-LVL III: ICD-10-PCS | Mod: PBBFAC,,,

## 2020-08-06 NOTE — TELEPHONE ENCOUNTER
----- Message from Sergio Earl sent at 8/6/2020  3:53 PM CDT -----  Contact: [phone] / self  Patient would like a call back from your office regarding filling out a release form. Please Advise.

## 2020-08-14 ENCOUNTER — CLINICAL SUPPORT (OUTPATIENT)
Dept: SMOKING CESSATION | Facility: CLINIC | Age: 45
End: 2020-08-14
Payer: COMMERCIAL

## 2020-08-14 DIAGNOSIS — F17.200 NICOTINE DEPENDENCE: Primary | ICD-10-CM

## 2020-08-14 PROCEDURE — 99404 PR PREVENT COUNSEL,INDIV,60 MIN: ICD-10-PCS | Mod: S$GLB,,, | Performed by: GENERAL PRACTICE

## 2020-08-14 PROCEDURE — 99404 PREV MED CNSL INDIV APPRX 60: CPT | Mod: S$GLB,,, | Performed by: GENERAL PRACTICE

## 2020-08-14 PROCEDURE — 99999 PR PBB SHADOW E&M-EST. PATIENT-LVL III: CPT | Mod: PBBFAC,,,

## 2020-08-14 PROCEDURE — 99999 PR PBB SHADOW E&M-EST. PATIENT-LVL III: ICD-10-PCS | Mod: PBBFAC,,,

## 2020-08-14 RX ORDER — BUPROPION HYDROCHLORIDE 150 MG/1
150 TABLET, EXTENDED RELEASE ORAL 2 TIMES DAILY
Qty: 60 TABLET | Refills: 0 | Status: SHIPPED | OUTPATIENT
Start: 2020-08-14 | End: 2021-08-24

## 2020-08-14 NOTE — Clinical Note
Patient remains tobacco free and taking Wellbutrin with no negative side effects at this time. Patient needs refill of Wellbutrin. We discussed relapse prevention strategies, relaxation breathing and stress management.

## 2020-08-14 NOTE — PROGRESS NOTES
Individual Follow-Up Form    8/14/2020    Quit Date: 7/1/20    Clinical Status of Patient: Outpatient    Length of Service: 60 minutes    Continuing Medication: yes  Wellbutrin    Other Medications:      Target Symptoms: Withdrawal and medication side effects. The following were  rated moderate (3) to severe (4) on TCRS:  · Moderate (3): none  · Severe (4): none    Comments: Patient remains tobacco free and taking Wellbutrin with no negative side effects at this time. Patient needs refill of Wellbutrin. We discussed relapse prevention strategies, relaxation breathing and stress management.    Diagnosis: F17.200    Next Visit: 2 weeks

## 2020-08-28 ENCOUNTER — CLINICAL SUPPORT (OUTPATIENT)
Dept: SMOKING CESSATION | Facility: CLINIC | Age: 45
End: 2020-08-28
Payer: COMMERCIAL

## 2020-08-28 DIAGNOSIS — F17.200 NICOTINE DEPENDENCE: Primary | ICD-10-CM

## 2020-08-28 PROCEDURE — 99999 PR PBB SHADOW E&M-EST. PATIENT-LVL III: CPT | Mod: PBBFAC,,,

## 2020-08-28 PROCEDURE — 99402 PR PREVENT COUNSEL,INDIV,30 MIN: ICD-10-PCS | Mod: S$GLB,,, | Performed by: GENERAL PRACTICE

## 2020-08-28 PROCEDURE — 99999 PR PBB SHADOW E&M-EST. PATIENT-LVL III: ICD-10-PCS | Mod: PBBFAC,,,

## 2020-08-28 PROCEDURE — 99402 PREV MED CNSL INDIV APPRX 30: CPT | Mod: S$GLB,,, | Performed by: GENERAL PRACTICE

## 2020-08-28 NOTE — Clinical Note
Patient remains tobacco free and taking Wellbutrin with no negative side effects at this time. Patient is confident she will not relapse as she finds Wellbutrin very effective. We discussed relapse prevention strategies. I will follow up in 2 weeks.

## 2020-08-28 NOTE — PROGRESS NOTES
Individual Follow-Up Form    8/28/2020    Quit Date: 7/1/20    Clinical Status of Patient: Outpatient    Length of Service: 30 minutes    Continuing Medication: yes  Wellbutrin    Other Medications:      Target Symptoms: Withdrawal and medication side effects. The following were  rated moderate (3) to severe (4) on TCRS:  · Moderate (3): none  · Severe (4): none    Comments: Patient remains tobacco free and taking Wellbutrin with no negative side effects at this time. Patient is confident she will not relapse as she finds Wellbutrin very effective. We discussed relapse prevention strategies. I will follow up in 2 weeks.     Diagnosis: F17.200    Next Visit: 2 weeks

## 2020-08-31 ENCOUNTER — OFFICE VISIT (OUTPATIENT)
Dept: FAMILY MEDICINE | Facility: HOSPITAL | Age: 45
End: 2020-08-31
Attending: FAMILY MEDICINE
Payer: MEDICAID

## 2020-08-31 DIAGNOSIS — Z00.01 ENCOUNTER FOR WELL ADULT EXAM WITH ABNORMAL FINDINGS: ICD-10-CM

## 2020-08-31 DIAGNOSIS — F41.1 GENERALIZED ANXIETY DISORDER: Primary | ICD-10-CM

## 2020-08-31 DIAGNOSIS — Z11.59 NEED FOR HEPATITIS C SCREENING TEST: ICD-10-CM

## 2020-08-31 DIAGNOSIS — J30.2 SEASONAL ALLERGIC RHINITIS, UNSPECIFIED TRIGGER: ICD-10-CM

## 2020-08-31 PROCEDURE — 99213 OFFICE O/P EST LOW 20 MIN: CPT | Performed by: STUDENT IN AN ORGANIZED HEALTH CARE EDUCATION/TRAINING PROGRAM

## 2020-08-31 RX ORDER — FLUTICASONE PROPIONATE 50 UG/1
2 SPRAY, METERED NASAL DAILY
Qty: 15.8 ML | Refills: 2 | Status: SHIPPED | OUTPATIENT
Start: 2020-08-31

## 2020-08-31 RX ORDER — PAROXETINE 10 MG/1
10 TABLET, FILM COATED ORAL NIGHTLY
Qty: 30 TABLET | Refills: 2 | Status: SHIPPED | OUTPATIENT
Start: 2020-08-31 | End: 2020-12-04 | Stop reason: SDUPTHER

## 2020-08-31 NOTE — PROGRESS NOTES
Subjective:       Patient ID: Caridad De La Garza is a 45 y.o. female.    Chief Complaint: Anxiety    Patient is a 44yo F with PMHx of Anemia and Gestational HTN that presents for Anxiety management. She states she used to smoke 3-4 cigarettes a night to help with anxiety and quit smoking 3 months ago, She has been on bupropion to help, but has had  increased anxiety since quitting. She also feels like her social circumstances of not working and having children who are anxious about returning to school has also affected her mood. Denies panic attacks. PHQ9: 7 GAD7: 16. She is interested in counseling.    She also complains of left ear pressure/discomfort. She denies cough, SOB, Fever, or chills.     Review of Systems   Constitutional: Negative for activity change, appetite change, fatigue and fever.   HENT: Positive for ear pain. Negative for hearing loss and trouble swallowing.    Eyes: Negative for visual disturbance.   Respiratory: Negative for cough and shortness of breath.    Cardiovascular: Negative for chest pain and leg swelling.   Gastrointestinal: Negative for abdominal pain, constipation, diarrhea, nausea and vomiting.   Genitourinary: Negative for difficulty urinating.   Musculoskeletal: Negative for arthralgias and myalgias.   Neurological: Negative for speech difficulty, numbness and headaches.   Psychiatric/Behavioral: Negative for dysphoric mood, self-injury, sleep disturbance and suicidal ideas. The patient is nervous/anxious.        Objective:      Vitals:    08/31/20 0932   BP: 138/88   Pulse: 88     Body mass index is 38.52 kg/m².    Physical Exam  Constitutional:       Appearance: She is well-developed.   HENT:      Head: Normocephalic and atraumatic.      Right Ear: Tympanic membrane, ear canal and external ear normal. There is no impacted cerumen.      Left Ear: Tympanic membrane, ear canal and external ear normal. There is no impacted cerumen.      Nose: Nose normal.   Eyes:      Extraocular  Movements: Extraocular movements intact.      Conjunctiva/sclera: Conjunctivae normal.   Neck:      Musculoskeletal: Normal range of motion and neck supple.   Cardiovascular:      Rate and Rhythm: Normal rate and regular rhythm.      Pulses: Normal pulses.      Heart sounds: Normal heart sounds.   Pulmonary:      Effort: Pulmonary effort is normal.      Breath sounds: Normal breath sounds.   Abdominal:      General: Bowel sounds are normal.      Palpations: Abdomen is soft.   Musculoskeletal: Normal range of motion.   Skin:     General: Skin is warm and dry.      Capillary Refill: Capillary refill takes less than 2 seconds.   Neurological:      Mental Status: She is alert and oriented to person, place, and time.   Psychiatric:         Mood and Affect: Mood normal.         Behavior: Behavior normal.         Thought Content: Thought content normal.         Judgment: Judgment normal.         Assessment:       1. Generalized anxiety disorder    2. Encounter for well adult exam with abnormal findings    3. Need for hepatitis C screening test    4. Seasonal allergic rhinitis, unspecified trigger        Plan:       Generalized anxiety disorder  -     paroxetine (PAXIL) 10 MG tablet; Take 1 tablet (10 mg total) by mouth every evening.  Dispense: 30 tablet; Refill: 2  -     Mental health handouts, including psychology today, given. Patient to trial paxil at night. Discussed r/b of medication and patient elects to proceed with paxil in addition to wellbutrin.     Encounter for well adult exam with abnormal findings  -     CBC auto differential; Future; Expected date: 08/31/2020  -     Comprehensive metabolic panel; Future; Expected date: 08/31/2020  -     TSH; Future; Expected date: 08/31/2020  -     Hemoglobin A1C; Future; Expected date: 08/31/2020  -     Lipid Panel; Future; Expected date: 08/31/2020    Need for hepatitis C screening test  -     Hepatitis C Antibody; Future; Expected date: 08/31/2020    Seasonal allergic  rhinitis, unspecified trigger  -     FLONASE ALLERGY RELIEF 50 mcg/actuation nasal spray; 2 sprays (100 mcg total) by Each Nostril route once daily.  Dispense: 15.8 mL; Refill: 2  -     Ear pressure likely related to decreased sinus drainage. Patient takes zyrtec and will add flonase.       Follow up in about 4 weeks (around 9/28/2020) for Anxiety Management.

## 2020-09-01 VITALS
HEART RATE: 88 BPM | WEIGHT: 231.5 LBS | HEIGHT: 65 IN | SYSTOLIC BLOOD PRESSURE: 138 MMHG | DIASTOLIC BLOOD PRESSURE: 88 MMHG | BODY MASS INDEX: 38.57 KG/M2

## 2020-09-02 ENCOUNTER — TELEPHONE (OUTPATIENT)
Dept: FAMILY MEDICINE | Facility: HOSPITAL | Age: 45
End: 2020-09-02

## 2020-10-30 PROBLEM — N92.1 MENORRHAGIA WITH IRREGULAR CYCLE: Status: ACTIVE | Noted: 2020-10-30

## 2020-12-04 ENCOUNTER — OFFICE VISIT (OUTPATIENT)
Dept: URGENT CARE | Facility: CLINIC | Age: 45
End: 2020-12-04
Payer: MEDICAID

## 2020-12-04 VITALS
WEIGHT: 230 LBS | SYSTOLIC BLOOD PRESSURE: 114 MMHG | BODY MASS INDEX: 39.27 KG/M2 | OXYGEN SATURATION: 98 % | DIASTOLIC BLOOD PRESSURE: 86 MMHG | TEMPERATURE: 98 F | HEART RATE: 88 BPM | HEIGHT: 64 IN

## 2020-12-04 DIAGNOSIS — F41.1 GENERALIZED ANXIETY DISORDER: ICD-10-CM

## 2020-12-04 DIAGNOSIS — R05.9 COUGH: Primary | ICD-10-CM

## 2020-12-04 LAB
CTP QC/QA: YES
SARS-COV-2 RDRP RESP QL NAA+PROBE: NEGATIVE

## 2020-12-04 PROCEDURE — 99214 OFFICE O/P EST MOD 30 MIN: CPT | Mod: S$GLB,,, | Performed by: FAMILY MEDICINE

## 2020-12-04 PROCEDURE — U0002 COVID-19 LAB TEST NON-CDC: HCPCS | Mod: QW,S$GLB,, | Performed by: FAMILY MEDICINE

## 2020-12-04 PROCEDURE — 99214 PR OFFICE/OUTPT VISIT, EST, LEVL IV, 30-39 MIN: ICD-10-PCS | Mod: S$GLB,,, | Performed by: FAMILY MEDICINE

## 2020-12-04 PROCEDURE — U0002: ICD-10-PCS | Mod: QW,S$GLB,, | Performed by: FAMILY MEDICINE

## 2020-12-04 RX ORDER — ALBUTEROL SULFATE 90 UG/1
2 AEROSOL, METERED RESPIRATORY (INHALATION) EVERY 6 HOURS PRN
Qty: 18 G | Refills: 2 | Status: SHIPPED | OUTPATIENT
Start: 2020-12-04

## 2020-12-04 RX ORDER — PREDNISONE 20 MG/1
TABLET ORAL
Qty: 10 TABLET | Refills: 0 | Status: SHIPPED | OUTPATIENT
Start: 2020-12-04 | End: 2021-04-27

## 2020-12-04 RX ORDER — PAROXETINE 10 MG/1
10 TABLET, FILM COATED ORAL NIGHTLY
Qty: 30 TABLET | Refills: 2 | Status: SHIPPED | OUTPATIENT
Start: 2020-12-04 | End: 2021-08-24

## 2020-12-04 RX ORDER — AZELASTINE 1 MG/ML
1 SPRAY, METERED NASAL 2 TIMES DAILY
Qty: 30 ML | Refills: 3 | Status: SHIPPED | OUTPATIENT
Start: 2020-12-04 | End: 2021-12-04

## 2020-12-04 RX ORDER — FLUTICASONE PROPIONATE 50 MCG
1 SPRAY, SUSPENSION (ML) NASAL 2 TIMES DAILY
Qty: 16 G | Refills: 1 | Status: SHIPPED | OUTPATIENT
Start: 2020-12-04 | End: 2023-11-10

## 2020-12-04 RX ORDER — MECLIZINE HYDROCHLORIDE 25 MG/1
25 TABLET ORAL 3 TIMES DAILY PRN
Qty: 30 TABLET | Refills: 1 | Status: SHIPPED | OUTPATIENT
Start: 2020-12-04 | End: 2021-08-24

## 2020-12-04 NOTE — PROGRESS NOTES
"Subjective:       Patient ID: Caridad De La Garza is a 45 y.o. female.    Vitals:  height is 5' 4" (1.626 m) and weight is 104.3 kg (230 lb). Her temperature is 97.8 °F (36.6 °C). Her blood pressure is 114/86 and her pulse is 88. Her oxygen saturation is 98%.     Chief Complaint: Chest Congestion    Patient presents to the clinic today with a cough and chest congestion. Pt states the cough gets worse when she is talking. Pt states symptoms started about 2-3 days ago. Pt has a history of bronchitis.       Constitution: Negative for chills, fatigue and fever.   HENT: Negative for congestion and sore throat.    Neck: Negative for painful lymph nodes.   Cardiovascular: Negative for chest pain and leg swelling.   Eyes: Negative for double vision and blurred vision.   Respiratory: Negative for cough and shortness of breath.    Gastrointestinal: Negative for nausea, vomiting and diarrhea.   Genitourinary: Negative for dysuria, frequency, urgency and history of kidney stones.   Musculoskeletal: Negative for joint pain, joint swelling, muscle cramps and muscle ache.   Skin: Negative for color change, pale, rash and bruising.   Allergic/Immunologic: Negative for seasonal allergies.   Neurological: Negative for dizziness, history of vertigo, light-headedness, passing out and headaches.   Hematologic/Lymphatic: Negative for swollen lymph nodes.   Psychiatric/Behavioral: Negative for nervous/anxious, sleep disturbance and depression. The patient is not nervous/anxious.        Objective:      Physical Exam      Physical Exam  Vitals signs and nursing note reviewed.   Constitutional:       Appearance: Pt is well-developed. Alert, NAD  HENT:      Head: Normocephalic and atraumatic.      Right Ear: External ear normal.      Left Ear: External ear normal.   Eyes:      General: Lids are normal.      Conjunctiva/sclera: Conjunctivae normal.      Pupils: Pupils are equal, round  Neck:      Musculoskeletal: Full passive range of motion " without pain and neck supple.      Trachea: Trachea and phonation normal.   Cardiovascular:      Rate and Rhythm: Normal rate. Extremities well perfused.   Pulmonary:      Effort: Pulmonary effort is normal.      Breath sounds: wheezing.    Abdomen: NO obvious distention.  Musculoskeletal: Normal range of motion. No ambulation issues  Skin:     General: Skin is warm and dry. No open wounds or abrasions  Neurological:      Mental Status:Pt is alert and oriented to person, place, and time.   Psychiatric:         Speech: Speech normal.         Behavior: Behavior normal.         Thought Content: Thought content normal.         Judgment: Judgment normal.       Assessment:       1. Cough        Plan:         Cough  -     POCT COVID-19 Rapid Screening    Other orders  -     albuterol (PROVENTIL/VENTOLIN HFA) 90 mcg/actuation inhaler; Inhale 2 puffs into the lungs every 6 (six) hours as needed for Wheezing. Rescue  Dispense: 18 g; Refill: 2  -     fluticasone propionate (FLONASE) 50 mcg/actuation nasal spray; 1 spray (50 mcg total) by Each Nostril route 2 (two) times daily.  Dispense: 16 g; Refill: 1  -     azelastine (ASTELIN) 137 mcg (0.1 %) nasal spray; 1 spray (137 mcg total) by Nasal route 2 (two) times daily.  Dispense: 30 mL; Refill: 3  -     meclizine (ANTIVERT) 25 mg tablet; Take 1 tablet (25 mg total) by mouth 3 (three) times daily as needed.  Dispense: 30 tablet; Refill: 1  -     predniSONE (DELTASONE) 20 MG tablet; Take 40mg x2 days, 30 mg x2 days, 20mg x2 days, 10mg x2 days  Dispense: 10 tablet; Refill: 0

## 2020-12-11 ENCOUNTER — OFFICE VISIT (OUTPATIENT)
Dept: URGENT CARE | Facility: CLINIC | Age: 45
End: 2020-12-11
Payer: MEDICAID

## 2020-12-11 VITALS
HEIGHT: 64 IN | SYSTOLIC BLOOD PRESSURE: 136 MMHG | BODY MASS INDEX: 39.27 KG/M2 | OXYGEN SATURATION: 100 % | HEART RATE: 82 BPM | TEMPERATURE: 99 F | RESPIRATION RATE: 18 BRPM | DIASTOLIC BLOOD PRESSURE: 82 MMHG | WEIGHT: 230 LBS

## 2020-12-11 DIAGNOSIS — R05.9 COUGH: Primary | ICD-10-CM

## 2020-12-11 LAB
CTP QC/QA: YES
SARS-COV-2 RDRP RESP QL NAA+PROBE: NEGATIVE

## 2020-12-11 PROCEDURE — 99214 OFFICE O/P EST MOD 30 MIN: CPT | Mod: S$GLB,,, | Performed by: PHYSICIAN ASSISTANT

## 2020-12-11 PROCEDURE — U0002: ICD-10-PCS | Mod: QW,S$GLB,, | Performed by: PHYSICIAN ASSISTANT

## 2020-12-11 PROCEDURE — 99214 PR OFFICE/OUTPT VISIT, EST, LEVL IV, 30-39 MIN: ICD-10-PCS | Mod: S$GLB,,, | Performed by: PHYSICIAN ASSISTANT

## 2020-12-11 PROCEDURE — U0002 COVID-19 LAB TEST NON-CDC: HCPCS | Mod: QW,S$GLB,, | Performed by: PHYSICIAN ASSISTANT

## 2020-12-11 RX ORDER — PROMETHAZINE HYDROCHLORIDE AND DEXTROMETHORPHAN HYDROBROMIDE 6.25; 15 MG/5ML; MG/5ML
5 SYRUP ORAL
Qty: 180 ML | Refills: 0 | Status: SHIPPED | OUTPATIENT
Start: 2020-12-11 | End: 2020-12-18

## 2020-12-11 RX ORDER — DEXAMETHASONE SODIUM PHOSPHATE 100 MG/10ML
10 INJECTION INTRAMUSCULAR; INTRAVENOUS
Status: COMPLETED | OUTPATIENT
Start: 2020-12-11 | End: 2020-12-11

## 2020-12-11 RX ORDER — BENZONATATE 100 MG/1
100 CAPSULE ORAL EVERY 6 HOURS PRN
Qty: 28 CAPSULE | Refills: 0 | Status: SHIPPED | OUTPATIENT
Start: 2020-12-11 | End: 2020-12-18

## 2020-12-11 RX ADMIN — DEXAMETHASONE SODIUM PHOSPHATE 10 MG: 100 INJECTION INTRAMUSCULAR; INTRAVENOUS at 07:12

## 2020-12-12 NOTE — PROGRESS NOTES
"Subjective:       Patient ID: Caridad De La Garza is a 45 y.o. female.    Vitals:  height is 5' 4" (1.626 m) and weight is 104.3 kg (230 lb). Her temporal temperature is 98.6 °F (37 °C). Her blood pressure is 136/82 and her pulse is 82. Her respiration is 18 and oxygen saturation is 100%.     Chief Complaint: Cough    Cough and congestion for 1 week    Cough  This is a new problem. The current episode started in the past 7 days. The problem has been unchanged. The problem occurs constantly. The cough is non-productive. Pertinent negatives include no chest pain, chills, ear congestion, ear pain, fever, headaches, heartburn, hemoptysis, myalgias, nasal congestion, postnasal drip, rash, rhinorrhea, sore throat, shortness of breath, sweats, weight loss or wheezing. Nothing aggravates the symptoms. She has tried nothing for the symptoms. The treatment provided no relief. There is no history of asthma, bronchiectasis, bronchitis, COPD, emphysema, environmental allergies or pneumonia.       Constitution: Negative for chills, fatigue and fever.   HENT: Negative for ear pain, congestion, postnasal drip and sore throat.    Neck: Negative for painful lymph nodes.   Cardiovascular: Negative for chest pain and leg swelling.   Eyes: Negative for double vision and blurred vision.   Respiratory: Positive for cough. Negative for bloody sputum, shortness of breath and wheezing.    Gastrointestinal: Negative for nausea, vomiting, diarrhea and heartburn.   Genitourinary: Negative for dysuria, frequency, urgency and history of kidney stones.   Musculoskeletal: Negative for joint pain, joint swelling, muscle cramps and muscle ache.   Skin: Negative for color change, pale, rash and bruising.   Allergic/Immunologic: Negative for environmental allergies and seasonal allergies.   Neurological: Negative for dizziness, history of vertigo, light-headedness, passing out and headaches.   Hematologic/Lymphatic: Negative for swollen lymph nodes. "   Psychiatric/Behavioral: Negative for nervous/anxious, sleep disturbance and depression. The patient is not nervous/anxious.        Objective:      Physical Exam   Constitutional: She is oriented to person, place, and time. She appears well-developed.  Non-toxic appearance. She does not appear ill. No distress.   HENT:   Head: Normocephalic and atraumatic.   Ears:   Right Ear: Hearing and external ear normal.   Left Ear: Hearing and external ear normal.   Eyes: Pupils are equal, round, and reactive to light. Conjunctivae and EOM are normal.   Neck: Normal range of motion. Neck supple. No neck rigidity.   Cardiovascular: Normal rate and regular rhythm.   Pulmonary/Chest: Effort normal and breath sounds normal. No respiratory distress. She has no decreased breath sounds. She has no wheezes. She has no rhonchi.   Frequent nonproductive cough    Comments: Frequent nonproductive cough    Abdominal:      Comments: Normal appearance of abdomen   Musculoskeletal: Normal range of motion.   Neurological: She is alert and oriented to person, place, and time.   Skin: Skin is warm, dry and not diaphoretic. Psychiatric: Her speech is normal and behavior is normal. Mood normal.   Nursing note and vitals reviewed.    Office Visit on 12/11/2020   Component Date Value Ref Range Status    POC Rapid COVID 12/11/2020 Negative  Negative Final     Acceptable 12/11/2020 Yes   Final           Assessment:       1. Cough        Plan:       All hx was provided by the pt or available as part of established EMR. The pt past medical hx, family hx, social hx, and current medications were reviewed. Interpretation of diagnostics performed today were discussed. Tx discussed. Quarantine recommendations based on CDC guidelines discussed. Pt to follow up with [facility] or PCP if no improvement or for any concern. Seek treatment in an ER for worsening. Pt voiced understanding of all discussed, and agreed.    Cough  -     POCT COVID-19 Rapid  Screening  -     dexamethasone injection 10 mg  -     benzonatate (TESSALON PERLES) 100 MG capsule; Take 1 capsule (100 mg total) by mouth every 6 (six) hours as needed for Cough.  Dispense: 28 capsule; Refill: 0  -     promethazine-dextromethorphan (PROMETHAZINE-DM) 6.25-15 mg/5 mL Syrp; Take 5 mLs by mouth every 4 to 6 hours as needed.  Dispense: 180 mL; Refill: 0      Patient Instructions   · Follow up with your primary care if symptoms do not improve, or you may return here at any time.  · If you were referred to a specialist, please follow up with that specialty.  · If you were prescribed antibiotics, please take them to completion.  · If you were prescribed a narcotic or any medication with sedative effects, do not drive or operate heavy equipment or machinery while taking these medications.  · You must understand that you have received treatment at an Urgent Care facility only, and that you may be released before all of your medical problems are known or treated. Urgent Care facilities are not equipped to handle life threatening emergencies. It is recommended that you seek care at an Emergency Department for further evaluation of worsening or concerning symptoms, or possibly life threatening conditions as discussed.                                        If you  smoke, please stop smoking               PLEASE PRACTICE SOCIAL DISTANCING        COVID19 testing is NEGATIVE and NO known high risk exposure to covid-19 virus, can exclude from work/school until:  o MINIMUM OF 24-48 hours fever-free without the use of fever-reducing medications AND  o Improvement in symptoms (e.g. cough, shortness of breath, fatigue, GI symptoms, etc)      You should follow CDC guidelines as well as your employer/school protocols for safely returning to work/school. Please be aware that there are False Negative possibilities with testing and you should return to work based upon CDC guidelines, not simply a negative result, unless  your employer/school has a different RTW protocol/guidance for you. If you are able to return to work, you should still quarantine outside of work based on CDC guidance as we discussed.         Over the counter and home treatment of symptoms:  Alternate tylenol and motrin every 3 hours  Salt water gargles  Cold-eeze helps to reduce the duration of sore throat symptoms  Cepachol helps to numb the discomfort  Chloroseptic spray  Nasal saline spray reduces inflammation and dryness  Warm face compresses as often as you can  Vicks vapor rub at night  Flonase OTC or Nasacort OTC  Simple foods like chicken noodle soup help  Pedialyte helps with dehydration if lacking appetite  Rest as much as you can

## 2021-01-19 ENCOUNTER — TELEPHONE (OUTPATIENT)
Dept: SMOKING CESSATION | Facility: CLINIC | Age: 46
End: 2021-01-19

## 2021-01-27 ENCOUNTER — TELEPHONE (OUTPATIENT)
Dept: SMOKING CESSATION | Facility: CLINIC | Age: 46
End: 2021-01-27

## 2021-02-04 ENCOUNTER — TELEPHONE (OUTPATIENT)
Dept: SMOKING CESSATION | Facility: CLINIC | Age: 46
End: 2021-02-04

## 2021-03-19 PROBLEM — M79.2 NEUROPATHIC PAIN: Status: ACTIVE | Noted: 2021-03-19

## 2021-04-15 ENCOUNTER — OFFICE VISIT (OUTPATIENT)
Dept: URGENT CARE | Facility: CLINIC | Age: 46
End: 2021-04-15
Payer: MEDICAID

## 2021-04-15 VITALS
TEMPERATURE: 99 F | HEIGHT: 64 IN | RESPIRATION RATE: 16 BRPM | WEIGHT: 247 LBS | HEART RATE: 89 BPM | DIASTOLIC BLOOD PRESSURE: 94 MMHG | SYSTOLIC BLOOD PRESSURE: 132 MMHG | BODY MASS INDEX: 42.17 KG/M2 | OXYGEN SATURATION: 97 %

## 2021-04-15 DIAGNOSIS — B34.9 NONSPECIFIC SYNDROME SUGGESTIVE OF VIRAL ILLNESS: ICD-10-CM

## 2021-04-15 DIAGNOSIS — J02.9 SORE THROAT: Primary | ICD-10-CM

## 2021-04-15 LAB
CTP QC/QA: YES
CTP QC/QA: YES
POC MOLECULAR INFLUENZA A AGN: NEGATIVE
POC MOLECULAR INFLUENZA B AGN: NEGATIVE
SARS-COV-2 RDRP RESP QL NAA+PROBE: NEGATIVE

## 2021-04-15 PROCEDURE — 99214 PR OFFICE/OUTPT VISIT, EST, LEVL IV, 30-39 MIN: ICD-10-PCS | Mod: S$GLB,,, | Performed by: FAMILY MEDICINE

## 2021-04-15 PROCEDURE — 87502 POCT INFLUENZA A/B MOLECULAR: ICD-10-PCS | Mod: QW,S$GLB,, | Performed by: FAMILY MEDICINE

## 2021-04-15 PROCEDURE — 99214 OFFICE O/P EST MOD 30 MIN: CPT | Mod: S$GLB,,, | Performed by: FAMILY MEDICINE

## 2021-04-15 PROCEDURE — U0002 COVID-19 LAB TEST NON-CDC: HCPCS | Mod: QW,S$GLB,, | Performed by: FAMILY MEDICINE

## 2021-04-15 PROCEDURE — 87502 INFLUENZA DNA AMP PROBE: CPT | Mod: QW,S$GLB,, | Performed by: FAMILY MEDICINE

## 2021-04-15 PROCEDURE — U0002: ICD-10-PCS | Mod: QW,S$GLB,, | Performed by: FAMILY MEDICINE

## 2021-04-15 RX ORDER — PROMETHAZINE HYDROCHLORIDE 6.25 MG/5ML
SYRUP ORAL
Qty: 120 ML | Refills: 1 | Status: SHIPPED | OUTPATIENT
Start: 2021-04-15 | End: 2021-08-24

## 2021-07-07 ENCOUNTER — CLINICAL SUPPORT (OUTPATIENT)
Dept: SMOKING CESSATION | Facility: CLINIC | Age: 46
End: 2021-07-07
Payer: COMMERCIAL

## 2021-07-07 ENCOUNTER — TELEPHONE (OUTPATIENT)
Dept: SMOKING CESSATION | Facility: CLINIC | Age: 46
End: 2021-07-07

## 2021-07-07 DIAGNOSIS — F17.200 NICOTINE DEPENDENCE: Primary | ICD-10-CM

## 2021-07-07 PROCEDURE — 99407 BEHAV CHNG SMOKING > 10 MIN: CPT | Mod: S$GLB,,,

## 2021-07-07 PROCEDURE — 99407 PR TOBACCO USE CESSATION INTENSIVE >10 MINUTES: ICD-10-PCS | Mod: S$GLB,,,

## 2021-08-24 ENCOUNTER — NURSE TRIAGE (OUTPATIENT)
Dept: ADMINISTRATIVE | Facility: CLINIC | Age: 46
End: 2021-08-24

## 2021-12-14 NOTE — PATIENT INSTRUCTIONS
· Follow up with your primary care if symptoms do not improve, or you may return here at any time.  · If you were referred to a specialist, please follow up with that specialty.  · If you were prescribed antibiotics, please take them to completion.  · If you were prescribed a narcotic or any medication with sedative effects, do not drive or operate heavy equipment or machinery while taking these medications.  · You must understand that you have received treatment at an Urgent Care facility only, and that you may be released before all of your medical problems are known or treated. Urgent Care facilities are not equipped to handle life threatening emergencies. It is recommended that you seek care at an Emergency Department for further evaluation of worsening or concerning symptoms, or possibly life threatening conditions as discussed.                                        If you  smoke, please stop smoking               PLEASE PRACTICE SOCIAL DISTANCING        COVID19 testing is NEGATIVE and NO known high risk exposure to covid-19 virus, can exclude from work/school until:  o MINIMUM OF 24-48 hours fever-free without the use of fever-reducing medications AND  o Improvement in symptoms (e.g. cough, shortness of breath, fatigue, GI symptoms, etc)      You should follow CDC guidelines as well as your employer/school protocols for safely returning to work/school. Please be aware that there are False Negative possibilities with testing and you should return to work based upon CDC guidelines, not simply a negative result, unless your employer/school has a different RTW protocol/guidance for you. If you are able to return to work, you should still quarantine outside of work based on CDC guidance as we discussed.         Over the counter and home treatment of symptoms:  Alternate tylenol and motrin every 3 hours  Salt water gargles  Cold-eeze helps to reduce the duration of sore throat symptoms  Cepachol helps to numb the  discomfort  Chloroseptic spray  Nasal saline spray reduces inflammation and dryness  Warm face compresses as often as you can  Vicks vapor rub at night  Flonase OTC or Nasacort OTC  Simple foods like chicken noodle soup help  Pedialyte helps with dehydration if lacking appetite  Rest as much as you can     negative - no polyuria, no polydipsia

## 2022-06-21 ENCOUNTER — OFFICE VISIT (OUTPATIENT)
Dept: FAMILY MEDICINE | Facility: HOSPITAL | Age: 47
End: 2022-06-21
Attending: SPECIALIST
Payer: MEDICAID

## 2022-06-21 ENCOUNTER — LAB VISIT (OUTPATIENT)
Dept: LAB | Facility: HOSPITAL | Age: 47
End: 2022-06-21
Attending: SPECIALIST
Payer: MEDICAID

## 2022-06-21 VITALS
DIASTOLIC BLOOD PRESSURE: 91 MMHG | SYSTOLIC BLOOD PRESSURE: 133 MMHG | WEIGHT: 222.88 LBS | HEIGHT: 66 IN | BODY MASS INDEX: 35.82 KG/M2 | HEART RATE: 85 BPM

## 2022-06-21 DIAGNOSIS — E66.9 CLASS 2 OBESITY WITH BODY MASS INDEX (BMI) OF 36.0 TO 36.9 IN ADULT, UNSPECIFIED OBESITY TYPE, UNSPECIFIED WHETHER SERIOUS COMORBIDITY PRESENT: ICD-10-CM

## 2022-06-21 DIAGNOSIS — E66.9 CLASS 2 OBESITY WITH BODY MASS INDEX (BMI) OF 36.0 TO 36.9 IN ADULT, UNSPECIFIED OBESITY TYPE, UNSPECIFIED WHETHER SERIOUS COMORBIDITY PRESENT: Primary | ICD-10-CM

## 2022-06-21 DIAGNOSIS — Z12.31 ENCOUNTER FOR SCREENING MAMMOGRAM FOR MALIGNANT NEOPLASM OF BREAST: ICD-10-CM

## 2022-06-21 DIAGNOSIS — K21.9 GASTROESOPHAGEAL REFLUX DISEASE, UNSPECIFIED WHETHER ESOPHAGITIS PRESENT: ICD-10-CM

## 2022-06-21 DIAGNOSIS — M79.672 PAIN OF LEFT HEEL: ICD-10-CM

## 2022-06-21 LAB
ALBUMIN SERPL BCP-MCNC: 3.7 G/DL (ref 3.5–5.2)
ALP SERPL-CCNC: 152 U/L (ref 55–135)
ALT SERPL W/O P-5'-P-CCNC: 52 U/L (ref 10–44)
ANION GAP SERPL CALC-SCNC: 9 MMOL/L (ref 8–16)
AST SERPL-CCNC: 42 U/L (ref 10–40)
BASOPHILS # BLD AUTO: 0.05 K/UL (ref 0–0.2)
BASOPHILS NFR BLD: 0.8 % (ref 0–1.9)
BILIRUB SERPL-MCNC: 0.5 MG/DL (ref 0.1–1)
BUN SERPL-MCNC: 7 MG/DL (ref 6–20)
CALCIUM SERPL-MCNC: 8.9 MG/DL (ref 8.7–10.5)
CHLORIDE SERPL-SCNC: 108 MMOL/L (ref 95–110)
CHOLEST SERPL-MCNC: 168 MG/DL (ref 120–199)
CHOLEST/HDLC SERPL: 4.1 {RATIO} (ref 2–5)
CO2 SERPL-SCNC: 23 MMOL/L (ref 23–29)
CREAT SERPL-MCNC: 0.6 MG/DL (ref 0.5–1.4)
DIFFERENTIAL METHOD: NORMAL
EOSINOPHIL # BLD AUTO: 0.2 K/UL (ref 0–0.5)
EOSINOPHIL NFR BLD: 3.7 % (ref 0–8)
ERYTHROCYTE [DISTWIDTH] IN BLOOD BY AUTOMATED COUNT: 12.3 % (ref 11.5–14.5)
EST. GFR  (AFRICAN AMERICAN): >60 ML/MIN/1.73 M^2
EST. GFR  (NON AFRICAN AMERICAN): >60 ML/MIN/1.73 M^2
ESTIMATED AVG GLUCOSE: 108 MG/DL (ref 68–131)
GLUCOSE SERPL-MCNC: 91 MG/DL (ref 70–110)
HBA1C MFR BLD: 5.4 % (ref 4–5.6)
HCT VFR BLD AUTO: 39.8 % (ref 37–48.5)
HDLC SERPL-MCNC: 41 MG/DL (ref 40–75)
HDLC SERPL: 24.4 % (ref 20–50)
HGB BLD-MCNC: 13.4 G/DL (ref 12–16)
IMM GRANULOCYTES # BLD AUTO: 0.02 K/UL (ref 0–0.04)
IMM GRANULOCYTES NFR BLD AUTO: 0.3 % (ref 0–0.5)
LDLC SERPL CALC-MCNC: 107.8 MG/DL (ref 63–159)
LYMPHOCYTES # BLD AUTO: 1.7 K/UL (ref 1–4.8)
LYMPHOCYTES NFR BLD: 28.1 % (ref 18–48)
MCH RBC QN AUTO: 29.9 PG (ref 27–31)
MCHC RBC AUTO-ENTMCNC: 33.7 G/DL (ref 32–36)
MCV RBC AUTO: 89 FL (ref 82–98)
MONOCYTES # BLD AUTO: 0.5 K/UL (ref 0.3–1)
MONOCYTES NFR BLD: 8.8 % (ref 4–15)
NEUTROPHILS # BLD AUTO: 3.6 K/UL (ref 1.8–7.7)
NEUTROPHILS NFR BLD: 58.3 % (ref 38–73)
NONHDLC SERPL-MCNC: 127 MG/DL
NRBC BLD-RTO: 0 /100 WBC
PLATELET # BLD AUTO: 232 K/UL (ref 150–450)
PMV BLD AUTO: 10.1 FL (ref 9.2–12.9)
POTASSIUM SERPL-SCNC: 4 MMOL/L (ref 3.5–5.1)
PROT SERPL-MCNC: 7.6 G/DL (ref 6–8.4)
RBC # BLD AUTO: 4.48 M/UL (ref 4–5.4)
SODIUM SERPL-SCNC: 140 MMOL/L (ref 136–145)
TRIGL SERPL-MCNC: 96 MG/DL (ref 30–150)
TSH SERPL DL<=0.005 MIU/L-ACNC: 0.58 UIU/ML (ref 0.4–4)
WBC # BLD AUTO: 6.15 K/UL (ref 3.9–12.7)

## 2022-06-21 PROCEDURE — 83036 HEMOGLOBIN GLYCOSYLATED A1C: CPT | Performed by: STUDENT IN AN ORGANIZED HEALTH CARE EDUCATION/TRAINING PROGRAM

## 2022-06-21 PROCEDURE — 99213 OFFICE O/P EST LOW 20 MIN: CPT | Performed by: STUDENT IN AN ORGANIZED HEALTH CARE EDUCATION/TRAINING PROGRAM

## 2022-06-21 PROCEDURE — 80053 COMPREHEN METABOLIC PANEL: CPT | Performed by: STUDENT IN AN ORGANIZED HEALTH CARE EDUCATION/TRAINING PROGRAM

## 2022-06-21 PROCEDURE — 80061 LIPID PANEL: CPT | Performed by: STUDENT IN AN ORGANIZED HEALTH CARE EDUCATION/TRAINING PROGRAM

## 2022-06-21 PROCEDURE — 85025 COMPLETE CBC W/AUTO DIFF WBC: CPT | Performed by: STUDENT IN AN ORGANIZED HEALTH CARE EDUCATION/TRAINING PROGRAM

## 2022-06-21 PROCEDURE — 36415 COLL VENOUS BLD VENIPUNCTURE: CPT | Performed by: STUDENT IN AN ORGANIZED HEALTH CARE EDUCATION/TRAINING PROGRAM

## 2022-06-21 PROCEDURE — 84443 ASSAY THYROID STIM HORMONE: CPT | Performed by: STUDENT IN AN ORGANIZED HEALTH CARE EDUCATION/TRAINING PROGRAM

## 2022-06-21 RX ORDER — PANTOPRAZOLE SODIUM 40 MG/1
40 TABLET, DELAYED RELEASE ORAL DAILY
Qty: 90 TABLET | Refills: 3 | Status: SHIPPED | OUTPATIENT
Start: 2022-06-21 | End: 2023-06-21

## 2022-06-21 NOTE — PROGRESS NOTES
Subjective:       Patient ID: Caridad De La Garza is a 46 y.o. female.    Chief Complaint: Gastroesophageal Reflux and Foot Injury (left)    HPI   45 yo F w/ pmhx of obesity, GERD, presenting for f/u. She states she began to experience left heel pain 2 months ago, intermittent, described as sharp to achy worse w/ movement when occurs. She takes ibuprofen which seems to relieve the pain. Denies hx of injury, increased activity. She did get new shoes 2 months ago with harder soles. She also needs refill on GERD medication; she has GERD symptoms daily, mainly in the morning also clears throat throughout the day. She is changing her diet decreasing acidic foods and eats >2 hrs before bed. She also is trying to lose weight w/ exercise and changing her diet. She is somewhat interested in weight loss medications. She wants to try diet and exercise first. She also need f/u mammogram; has hx of hysterectomy w/ cervix removal. Also states she may have IBS w/ constipation, has irregular BM, she may go 1 week w/o defecation. Has used fiber supplement and stool softener to have BM. Denies rectal bleeding, hematochezia, hemorrhoids. ROS otherwise negative.    Review of Systems   Constitutional: Negative for chills, diaphoresis, fatigue and fever.   HENT: Negative for congestion and rhinorrhea.    Eyes: Negative for discharge and visual disturbance.   Respiratory: Negative for cough, chest tightness, shortness of breath and wheezing.    Cardiovascular: Negative for chest pain, palpitations and leg swelling.   Gastrointestinal: Positive for constipation. Negative for abdominal pain, diarrhea, nausea and vomiting.   Endocrine: Negative for polydipsia and polyuria.   Genitourinary: Negative for dysuria, flank pain and hematuria.   Musculoskeletal: Negative for arthralgias and myalgias.   Skin: Negative for color change and rash.   Neurological: Negative for dizziness, syncope, weakness and headaches.   Psychiatric/Behavioral:  Negative for confusion and sleep disturbance. The patient is not nervous/anxious.        Objective:      Vitals:    06/21/22 0945   BP: (!) 133/91   Pulse: 85     Physical Exam  Vitals reviewed.   Constitutional:       General: She is not in acute distress.     Appearance: Normal appearance. She is well-developed. She is obese. She is not ill-appearing or diaphoretic.   HENT:      Head: Normocephalic and atraumatic.      Right Ear: External ear normal.      Left Ear: External ear normal.      Nose: Nose normal. No congestion or rhinorrhea.      Mouth/Throat:      Mouth: Mucous membranes are moist.      Pharynx: Oropharynx is clear.   Eyes:      Conjunctiva/sclera: Conjunctivae normal.      Pupils: Pupils are equal, round, and reactive to light.   Cardiovascular:      Rate and Rhythm: Normal rate and regular rhythm.      Pulses: Normal pulses.      Heart sounds: Normal heart sounds.   Pulmonary:      Effort: Pulmonary effort is normal. No respiratory distress.      Breath sounds: Normal breath sounds.   Abdominal:      General: Bowel sounds are normal.      Palpations: Abdomen is soft.   Musculoskeletal:         General: No swelling or tenderness. Normal range of motion.      Cervical back: Normal range of motion and neck supple.   Skin:     General: Skin is warm and dry.      Capillary Refill: Capillary refill takes less than 2 seconds.   Neurological:      General: No focal deficit present.      Mental Status: She is alert and oriented to person, place, and time.      Cranial Nerves: No cranial nerve deficit.   Psychiatric:         Mood and Affect: Mood normal.         Behavior: Behavior normal.         Assessment:       1. Class 2 obesity with body mass index (BMI) of 36.0 to 36.9 in adult, unspecified obesity type, unspecified whether serious comorbidity present    2. Encounter for screening mammogram for malignant neoplasm of breast    3. Gastroesophageal reflux disease, unspecified whether esophagitis present     4. Pain of left heel        Plan:       Class 2 obesity with body mass index (BMI) of 36.0 to 36.9 in adult, unspecified obesity type, unspecified whether serious comorbidity present  -     Comprehensive Metabolic Panel; Future; Expected date: 06/21/2022  -     CBC Auto Differential; Future; Expected date: 06/21/2022  -     Lipid Panel; Future; Expected date: 06/21/2022  -     Hemoglobin A1C; Future; Expected date: 06/21/2022  -     TSH; Future; Expected date: 06/21/2022    Encounter for screening mammogram for malignant neoplasm of breast  -     Mammo Digital Screening Bilat w/ Hi; Future; Expected date: 06/21/2022    Gastroesophageal reflux disease, unspecified whether esophagitis present  -     pantoprazole (PROTONIX) 40 MG tablet; Take 1 tablet (40 mg total) by mouth once daily.  Dispense: 90 tablet; Refill: 3    Pain of left heel  -     X-Ray Calcaneus 2 View Left; Future; Expected date: 06/21/2022    Counseled on diet and exercise for weight loss. IF she has trouble losing weight over the next 2 months she will present to discuss initiation of weight loss medications (adipex, liraglutide, etc). XR heel today for heel pain, counseled on stretching and heel icing. Mammogram ordered. Meds and labs as above. RTC 3 months.    No follow-ups on file.      Mak Trotter DO HO-3  [facility]

## 2022-06-23 ENCOUNTER — PATIENT MESSAGE (OUTPATIENT)
Dept: FAMILY MEDICINE | Facility: HOSPITAL | Age: 47
End: 2022-06-23
Payer: MEDICAID

## 2022-06-24 DIAGNOSIS — Z12.11 ENCOUNTER FOR SCREENING FOR MALIGNANT NEOPLASM OF COLON: Primary | ICD-10-CM

## 2022-06-30 ENCOUNTER — HOSPITAL ENCOUNTER (OUTPATIENT)
Dept: RADIOLOGY | Facility: HOSPITAL | Age: 47
Discharge: HOME | End: 2022-06-30
Attending: STUDENT IN AN ORGANIZED HEALTH CARE EDUCATION/TRAINING PROGRAM
Payer: MEDICAID

## 2022-06-30 DIAGNOSIS — M79.672 PAIN OF LEFT HEEL: ICD-10-CM

## 2022-06-30 PROCEDURE — 73650 X-RAY EXAM OF HEEL: CPT | Mod: TC,FY,PO,LT

## 2022-06-30 PROCEDURE — 73650 XR CALCANEUS 2 VIEW LEFT: ICD-10-PCS | Mod: 26,LT,, | Performed by: RADIOLOGY

## 2022-06-30 PROCEDURE — 73650 X-RAY EXAM OF HEEL: CPT | Mod: 26,LT,, | Performed by: RADIOLOGY

## 2022-07-01 ENCOUNTER — PATIENT MESSAGE (OUTPATIENT)
Dept: FAMILY MEDICINE | Facility: HOSPITAL | Age: 47
End: 2022-07-01
Payer: MEDICAID

## 2022-07-01 DIAGNOSIS — M77.32 CALCANEAL SPUR OF LEFT FOOT: Primary | ICD-10-CM

## 2022-07-01 NOTE — TELEPHONE ENCOUNTER
X-ray left calcaneus with bone spur. Patient notified of results, will send referral to podiatry for further management.     Maulik Louis DO  [facility], PGY-3

## 2022-07-06 ENCOUNTER — TELEPHONE (OUTPATIENT)
Dept: FAMILY MEDICINE | Facility: HOSPITAL | Age: 47
End: 2022-07-06
Payer: MEDICAID

## 2022-08-22 LAB — NONINV COLON CA DNA+OCC BLD SCRN STL QL: NEGATIVE

## 2022-08-23 ENCOUNTER — OFFICE VISIT (OUTPATIENT)
Dept: PODIATRY | Facility: CLINIC | Age: 47
End: 2022-08-23
Payer: MEDICAID

## 2022-08-23 DIAGNOSIS — M72.2 PLANTAR FASCIITIS OF LEFT FOOT: Primary | ICD-10-CM

## 2022-08-23 DIAGNOSIS — M21.6X2 ACQUIRED EQUINUS DEFORMITY OF BOTH FEET: ICD-10-CM

## 2022-08-23 DIAGNOSIS — M21.6X1 ACQUIRED EQUINUS DEFORMITY OF BOTH FEET: ICD-10-CM

## 2022-08-23 DIAGNOSIS — M72.2 PLANTAR FASCIITIS OF RIGHT FOOT: ICD-10-CM

## 2022-08-23 DIAGNOSIS — M77.32 CALCANEAL SPUR OF LEFT FOOT: ICD-10-CM

## 2022-08-23 PROCEDURE — 3044F HG A1C LEVEL LT 7.0%: CPT | Mod: CPTII,,, | Performed by: PODIATRIST

## 2022-08-23 PROCEDURE — 3044F PR MOST RECENT HEMOGLOBIN A1C LEVEL <7.0%: ICD-10-PCS | Mod: CPTII,,, | Performed by: PODIATRIST

## 2022-08-23 PROCEDURE — 99999 PR PBB SHADOW E&M-EST. PATIENT-LVL III: ICD-10-PCS | Mod: PBBFAC,,, | Performed by: PODIATRIST

## 2022-08-23 PROCEDURE — 1160F PR REVIEW ALL MEDS BY PRESCRIBER/CLIN PHARMACIST DOCUMENTED: ICD-10-PCS | Mod: CPTII,,, | Performed by: PODIATRIST

## 2022-08-23 PROCEDURE — 99203 PR OFFICE/OUTPT VISIT, NEW, LEVL III, 30-44 MIN: ICD-10-PCS | Mod: S$PBB,,, | Performed by: PODIATRIST

## 2022-08-23 PROCEDURE — 99203 OFFICE O/P NEW LOW 30 MIN: CPT | Mod: S$PBB,,, | Performed by: PODIATRIST

## 2022-08-23 PROCEDURE — 1160F RVW MEDS BY RX/DR IN RCRD: CPT | Mod: CPTII,,, | Performed by: PODIATRIST

## 2022-08-23 PROCEDURE — 99213 OFFICE O/P EST LOW 20 MIN: CPT | Mod: PBBFAC,PN | Performed by: PODIATRIST

## 2022-08-23 PROCEDURE — 1159F PR MEDICATION LIST DOCUMENTED IN MEDICAL RECORD: ICD-10-PCS | Mod: CPTII,,, | Performed by: PODIATRIST

## 2022-08-23 PROCEDURE — 99999 PR PBB SHADOW E&M-EST. PATIENT-LVL III: CPT | Mod: PBBFAC,,, | Performed by: PODIATRIST

## 2022-08-23 PROCEDURE — 1159F MED LIST DOCD IN RCRD: CPT | Mod: CPTII,,, | Performed by: PODIATRIST

## 2022-08-23 RX ORDER — MELOXICAM 15 MG/1
15 TABLET ORAL DAILY
Qty: 30 TABLET | Refills: 0 | Status: SHIPPED | OUTPATIENT
Start: 2022-08-23 | End: 2023-11-10

## 2022-08-23 NOTE — PROGRESS NOTES
Subjective:      Patient ID: Caridad De La Garza is a 47 y.o. female.    Chief Complaint: Heel Pain (BOTH   HAD XRAYS DONE)    Caridad is a 47 y.o. female who presents to the clinic complaining of heel pain in both feet left worse than right, especially with the first step in the morning or after rest. The pain is described as Aching and Sharp. The onset of the pain was gradual and has worsened over the past several months. Caridad rates the pain as 8/10. She denies a history of trauma. Prior treatments include rest and wearing inserts in her shoes, the insert has helped.    Review of Systems   Constitutional: Negative for chills and fever.   Cardiovascular: Negative for claudication and leg swelling.   Respiratory: Negative for shortness of breath.    Skin: Negative for itching, nail changes and rash.   Musculoskeletal: Negative for muscle cramps, muscle weakness and myalgias.        Left heel pain   Gastrointestinal: Negative for nausea and vomiting.   Neurological: Negative for focal weakness, loss of balance, numbness and paresthesias.           Objective:      Physical Exam  Constitutional:       General: She is not in acute distress.     Appearance: She is well-developed. She is not diaphoretic.   Cardiovascular:      Pulses:           Dorsalis pedis pulses are 2+ on the right side and 2+ on the left side.        Posterior tibial pulses are 2+ on the right side and 2+ on the left side.      Comments: < 3 sec capillary refill time to toes 1-5 bilateral. Toes and feet are warm to touch proximally with normal distal cooling b/l. There is some hair growth on the feet and toes b/l. There is no edema b/l. No spider veins or varicosities present b/l.     Musculoskeletal:      Comments: Equinus noted b/l ankles with < 10 deg DF noted. MMT 5/5 in DF/PF/Inv/Ev resistance with no reproduction of pain in any direction. Passive range of motion of ankle and pedal joints is painless b/l.    Pain on palpation plantar  medial and central heel bilateral worse on left. No pain with ROM or MMT. No pain with medial and lateral compression of heel.       Skin:     General: Skin is warm and dry.      Coloration: Skin is not pale.      Findings: No abrasion, bruising, burn, ecchymosis, erythema, laceration, lesion, petechiae or rash.      Nails: There is no clubbing.      Comments: Skin temperature, texture and turgor within normal limits.   Neurological:      Mental Status: She is alert and oriented to person, place, and time.      Sensory: No sensory deficit.      Motor: No tremor, atrophy or abnormal muscle tone.      Comments: Negative tinel sign bilateral.   Psychiatric:         Behavior: Behavior normal.               Assessment:       Encounter Diagnoses   Name Primary?    Calcaneal spur of left foot     Plantar fasciitis of left foot Yes    Plantar fasciitis of right foot     Acquired equinus deformity of both feet          Plan:       Caridad was seen today for heel pain.    Diagnoses and all orders for this visit:    Plantar fasciitis of left foot  -     meloxicam (MOBIC) 15 MG tablet; Take 1 tablet (15 mg total) by mouth once daily.    Calcaneal spur of left foot  -     Ambulatory referral/consult to Podiatry    Plantar fasciitis of right foot    Acquired equinus deformity of both feet      I counseled the patient on her conditions, their implications and medical management.    Patient will obtain over the counter arch supports and wear them in shoes whenever possible.  Athletic shoes intended for walking or running are usually best.    Patient will stretch the tendo achilles complex three times daily as demonstrated in the office.  Literature was dispensed illustrating proper stretching technique.    Avoid barefoot or flats    The patient was advised that NSAID-type medications have two very important potential side effects: gastrointestinal irritation including hemorrhage and renal injuries. She was asked to take the  medication with food and to stop if she experiences any GI upset.    Return 6 weeks consider injections and PT If pain persists    Hayes Awan DPM

## 2022-08-23 NOTE — PATIENT INSTRUCTIONS
1. Stretch calf and plantar fascia at least 3x per day for 30 sec and before getting out of bed.    2. Supportive shoes at all times (athletic shoe including leon, new balance, asics, HOKA or casual shoes like Dansko, Brigette, Naot, Vionoic, Fit flop  clog or wedge with extra heel padding and arch support. For house slippers would recommend Fitflop or Spenco found on amazon.com, Never walk barefoot or in flats.    (Varsity sports, Phidippides, [place] running company, Masseys, Goodfeet, Cantilever, Feet First, Foot Solutions, Therapeutic shoes, SAS, [facility] pro shop) http://www.E-Sign/    3. Orthotic (recommend the following brands: Superfeet, Spenco, Powerstep, Sof Sole Fit Series)              4. NSAID's for 2-3 weeks if no GI or Kidney problems (example: ibuprofen 600 mg 2 x per day)    5. ICE massage with frozen water bottle 2x per day for 30 minutes.    6. Consider night splint, custom orthotics, therapy and/or steroid injection    What Is Plantar Fasciitis?   The plantar fascia is a ligament-like band running from your heel to the ball of your foot. This band pulls on the heel bone, raising the arch of your foot as it pushes off the ground. But if your foot moves incorrectly, the plantar fascia may become strained. The fascia may swell and its tiny fibers may begin to fray, causing plantar fasciitis.  Causes  Plantar fasciitis is often caused by poor foot mechanics. If your foot flattens too much, the fascia may overstretch and swell. If your foot flattens too little, the fascia may ache from being pulled too tight.    The plantar fascia is a thick, fibrous layer of tissue that covers the bones on the bottom of your foot. It holds the foot bones in an arched position. Plantar fasciitis is a painful swelling of the plantar fascia.  A heel spur is an overgrowth of bone where the plantar fascia attaches to the heel bone. The heel spur itself usually doesnt cause pain. However, the heel  spur might be a sign of plantar fasciitis which may cause your foot pain. There is no specific treatment for heel spurs.   Plantar fasciitis can develop slowly or suddenly. It usually affects one foot at a time. Heel pain can feel sharp, like a knife sticking into the bottom of your foot. You may feel pain after exercising, long-distance jogging, stair climbing, long periods of standing, or after standing up.  Risk factors for plantar fasciitis include: arthritis, diabetes, obesity or recent weight gain, flat foot, and having high arches. Wearing high heels, loose shoes, or shoes with poor arch support adds to the risk.    Foot pain is usually worse in the morning. But it improves with walking. By the end of the day there may be a dull aching. Treatment includes short-term rest and controlling inflammation. It may take up to 9 months before all symptoms go away. In rare cases, a steroid injection in the foot, or surgery, may be needed.  Home care  If you are overweight, lose weight to help healing.  Choose supportive shoes with good arch support and shock absorbency. Replace athletic shoes when they become worn out. Dont walk or run barefoot.  Premade or custom-fitted shoe inserts may be helpful. Inserts made of silicone seem to be the most effective. Custom-made inserts can be provided by a podiatrist or foot specialist, physical therapist, or orthopedist.  Premade or custom-made night splints keep the heel stretched out while you sleep. They may prevent morning pain.  Avoid activities that stress the feet: jogging, prolonged standing or walking, contact sports, etc.  First thing in the morning and before sports, stretch the bottom of your foot. Gently flex your ankle so the toes move toward your knee.  Icing may help control heel pain. Apply an ice pack to the heel for 10-20 minutes as a preventive. Or ice your heel after a severe flare-up of symptoms. You may repeat this every 1-2 hours as needed.  You may use  over-the-counter pain medicine to control pain, unless another medicine was prescribed. Anti-inflammatory pain medicines, such as ibuprofen or naproxen, may work better than acetaminophen. If you have chronic liver or kidney disease or ever had a stomach ulcer or GI bleeding, talk with your healthcare provider before using these medicines.  Shoe inserts, a night splint, or a special boot may be needed. Use these as directed by your healthcare provider.      Treating Plantar Fasciitis    First, your doctor relieves pain. Then, the cause of your problem may be found and corrected. If your pain is due to poor foot mechanics, custom-made shoe inserts (orthoses) may help.        Reduce Symptoms:  To relieve mild symptoms, try aspirin, ibuprofen, or other medications as directed. Rubbing ice on the affected area may also help.  To reduce severe pain and swelling, your doctor may prescribe pills or injections or a walking cast in some instances. Physical therapy, such as ultrasound or a daily stretching program, may also be recommended. Surgery is rarely required.  To reduce symptoms caused by poor foot mechanics, your foot may be taped. This supports the arch and temporarily controls movement. Night splints may also help by stretching the fascia.    Control Movement  If taping helps, your doctor may prescribe orthoses. Built from plaster casts of your feet, these inserts control the way your foot moves. As a result, your symptoms should go away.  If Surgery Is Needed  Your doctor may consider surgery if other types of treatment don't control your pain. During surgery, the plantar fascia is partially cut to release tension. As you heal, fibrous tissue fills the space between the heel bone and the plantar fascia.   Reduce Overuse  Every time your foot strikes the ground, the plantar fascia is stretched. You can reduce the strain on the plantar fascia and the possibility of overuse by following these suggestions:  Lose any  excess weight.  Avoid running on hard or uneven ground.  Use orthoses at all times in your shoes and house slippers.  © 2624-5805 [employer]. [address] [place] [address]. All rights reserved. This information is not intended as a substitute for professional medical care. Always follow your healthcare professional's instructions.            _                Lower Body Exercises: Calf Stretch    This exercise both stretches and strengthens your lower body to help your back. Do the exercise as often as suggested by your health care provider. As you work out, dont rush or strain. Use an exercise mat, pillow, or folded towel to protect your knees and other sensitive areas.  Face a wall 2 feet away. Step toward the wall with one foot.  Place both palms on the wall and bend your front knee.  Lean forward, keeping the back leg straight and the heel on the floor.  Hold for 20 seconds. Switch legs.  © 0303-4307 [employer]. [address] [place] [address]. All rights reserved. This information is not intended as a substitute for professional medical care. Always follow your healthcare professional's instructions.    These instructions are for your right foot. Switch sides for your left foot.  Sit in a chair. Rest your right ankle on your left knee.  Hold your toes with your right hand. Gently bend the toes backward. Feel a stretch in the undersides of the toes and ball of the foot. Hold for 30 to 60 seconds.  Then gently bend the toes in the other direction. Gently press on them until your foot is pointed. Hold for 30 to 60 seconds.  Repeat 5 times, or as instructed.  Date Last Reviewed: 5/1/2016  © 7509-4729 [employer]. [address] [place] [address]. All rights reserved. This information is not intended as a substitute for professional medical care. Always follow your healthcare professional's instructions.

## 2023-05-15 ENCOUNTER — OFFICE VISIT (OUTPATIENT)
Dept: URGENT CARE | Facility: CLINIC | Age: 48
End: 2023-05-15
Payer: MEDICAID

## 2023-05-15 VITALS
SYSTOLIC BLOOD PRESSURE: 124 MMHG | WEIGHT: 222 LBS | BODY MASS INDEX: 35.68 KG/M2 | TEMPERATURE: 97 F | HEIGHT: 66 IN | DIASTOLIC BLOOD PRESSURE: 85 MMHG | HEART RATE: 88 BPM | OXYGEN SATURATION: 98 % | RESPIRATION RATE: 16 BRPM

## 2023-05-15 DIAGNOSIS — R09.81 SINUS CONGESTION: ICD-10-CM

## 2023-05-15 DIAGNOSIS — H92.01 OTALGIA OF RIGHT EAR: ICD-10-CM

## 2023-05-15 DIAGNOSIS — H66.91 RIGHT OTITIS MEDIA, UNSPECIFIED OTITIS MEDIA TYPE: Primary | ICD-10-CM

## 2023-05-15 PROCEDURE — 99213 OFFICE O/P EST LOW 20 MIN: CPT | Mod: S$GLB,,, | Performed by: PHYSICIAN ASSISTANT

## 2023-05-15 PROCEDURE — 99213 PR OFFICE/OUTPT VISIT, EST, LEVL III, 20-29 MIN: ICD-10-PCS | Mod: S$GLB,,, | Performed by: PHYSICIAN ASSISTANT

## 2023-05-15 RX ORDER — PROMETHAZINE HYDROCHLORIDE AND DEXTROMETHORPHAN HYDROBROMIDE 6.25; 15 MG/5ML; MG/5ML
5 SYRUP ORAL EVERY 4 HOURS PRN
Qty: 240 ML | Refills: 0 | Status: SHIPPED | OUTPATIENT
Start: 2023-05-15 | End: 2023-05-25

## 2023-05-15 RX ORDER — AMOXICILLIN 875 MG/1
875 TABLET, FILM COATED ORAL EVERY 12 HOURS
Qty: 14 TABLET | Refills: 0 | Status: SHIPPED | OUTPATIENT
Start: 2023-05-15 | End: 2023-05-22

## 2023-05-15 RX ORDER — PREDNISONE 10 MG/1
10 TABLET ORAL DAILY
Qty: 3 TABLET | Refills: 0 | Status: SHIPPED | OUTPATIENT
Start: 2023-05-15 | End: 2023-05-18

## 2023-05-15 NOTE — PROGRESS NOTES
"Subjective:      Patient ID: Caridad De La Garza is a 47 y.o. female.    Vitals:  height is 5' 5.5" (1.664 m) and weight is 100.7 kg (222 lb). Her temperature is 97 °F (36.1 °C). Her blood pressure is 124/85 and her pulse is 88. Her respiration is 16 and oxygen saturation is 98%.     Chief Complaint: Sinus Problem    Pt c/o post nasal drip, congestion, otalgia, headaches/sinus pressure and fatigue x 2 days. Exposed to illness with unknown specificity. Taking Sudafed and Flonase prn with mild relief. Chronic Otitis Media. Pt saw ENT 6 months ago. Advised to follow up.    Sinus Problem  This is a new problem. The current episode started yesterday. The problem is unchanged. There has been no fever. Her pain is at a severity of 8/10. The pain is moderate. Associated symptoms include congestion, coughing, ear pain, headaches and sinus pressure. Pertinent negatives include no chills, diaphoresis, neck pain, shortness of breath, sneezing or sore throat. Past treatments include nasal decongestants and oral decongestants. The treatment provided mild relief.     Constitution: Negative for chills, sweating, fatigue and fever.   HENT:  Positive for ear pain, congestion, postnasal drip, sinus pain and sinus pressure. Negative for drooling, sore throat, trouble swallowing and voice change.    Neck: Negative for neck pain, neck stiffness, painful lymph nodes and neck swelling.   Cardiovascular:  Negative for chest pain, leg swelling, palpitations, sob on exertion and passing out.   Eyes:  Negative for eye pain, eye redness, photophobia, double vision, blurred vision and eyelid swelling.   Respiratory:  Positive for cough. Negative for chest tightness, sputum production, bloody sputum, shortness of breath, stridor and wheezing.    Gastrointestinal:  Negative for abdominal pain, abdominal bloating, nausea, vomiting, constipation, diarrhea and heartburn.   Musculoskeletal:  Negative for joint pain, joint swelling, abnormal ROM of " joint, back pain, muscle cramps and muscle ache.   Skin:  Negative for rash and hives.   Allergic/Immunologic: Negative for seasonal allergies, food allergies, hives, itching and sneezing.   Neurological:  Positive for headaches. Negative for dizziness, light-headedness, passing out, loss of balance, altered mental status, loss of consciousness and seizures.   Hematologic/Lymphatic: Negative for swollen lymph nodes.   Psychiatric/Behavioral:  Negative for altered mental status and nervous/anxious. The patient is not nervous/anxious.     Objective:     Physical Exam   Constitutional: She is oriented to person, place, and time. She appears well-developed. She is cooperative.  Non-toxic appearance. She does not appear ill. No distress.   HENT:   Head: Normocephalic and atraumatic.   Ears:   Right Ear: Hearing and external ear normal. No no drainage or swelling. No foreign bodies. No mastoid tenderness. Tympanic membrane is erythematous. Tympanic membrane is not bulging.   Left Ear: Hearing, tympanic membrane, external ear and ear canal normal.   Ears:    Nose: Mucosal edema and rhinorrhea present. No nasal deformity. No epistaxis. Right sinus exhibits no maxillary sinus tenderness and no frontal sinus tenderness. Left sinus exhibits no maxillary sinus tenderness and no frontal sinus tenderness.   Mouth/Throat: Uvula is midline and mucous membranes are normal. No trismus in the jaw. Normal dentition. No uvula swelling. Posterior oropharyngeal erythema and cobblestoning present. No oropharyngeal exudate, posterior oropharyngeal edema or tonsillar abscesses. No tonsillar exudate.   Eyes: Conjunctivae and lids are normal. No scleral icterus.   Neck: Trachea normal and phonation normal. Neck supple. No edema present. No erythema present. No neck rigidity present.   Cardiovascular: Normal rate, regular rhythm, normal heart sounds and normal pulses.   Pulmonary/Chest: Effort normal and breath sounds normal. No accessory  muscle usage or stridor. No respiratory distress. She has no decreased breath sounds. She has no wheezes. She has no rhonchi. She has no rales.   Abdominal: Normal appearance.   Musculoskeletal: Normal range of motion.         General: No deformity. Normal range of motion.   Lymphadenopathy:     She has no cervical adenopathy.   Neurological: She is alert and oriented to person, place, and time. She exhibits normal muscle tone. Coordination normal.   Skin: Skin is warm, dry, intact, not diaphoretic, not pale and no rash. Capillary refill takes less than 2 seconds.   Psychiatric: Her speech is normal and behavior is normal. Judgment and thought content normal.   Nursing note and vitals reviewed.    Assessment:     1. Right otitis media, unspecified otitis media type    2. Otalgia of right ear    3. Sinus congestion        Plan:       Right otitis media, unspecified otitis media type    Otalgia of right ear    Sinus congestion    Other orders  -     predniSONE (DELTASONE) 10 MG tablet; Take 1 tablet (10 mg total) by mouth once daily. for 3 days  Dispense: 3 tablet; Refill: 0  -     promethazine-dextromethorphan (PROMETHAZINE-DM) 6.25-15 mg/5 mL Syrp; Take 5 mLs by mouth every 4 (four) hours as needed (cough).  Dispense: 240 mL; Refill: 0  -     amoxicillin (AMOXIL) 875 MG tablet; Take 1 tablet (875 mg total) by mouth every 12 (twelve) hours. for 7 days  Dispense: 14 tablet; Refill: 0      Patient Instructions   You must understand that you've received an Urgent Care treatment only and that you may be released before all your medical problems are known or treated. You, the patient, will arrange for follow up care as instructed.  Follow up with your PCP or specialty clinic as directed if not improved or as needed. You can call [phone] to schedule an appointment with the appropriate provider.  If your condition worsens we recommend that you receive another evaluation at the Emergency Department for any concerns or  worsening of condition.  Patient aware and verbalized understanding.

## 2023-05-15 NOTE — PATIENT INSTRUCTIONS
You must understand that you've received an Urgent Care treatment only and that you may be released before all your medical problems are known or treated. You, the patient, will arrange for follow up care as instructed.  Follow up with your PCP or specialty clinic as directed if not improved or as needed. You can call [phone] to schedule an appointment with the appropriate provider.  If your condition worsens we recommend that you receive another evaluation at the Emergency Department for any concerns or worsening of condition.  Patient aware and verbalized understanding.

## 2023-11-07 ENCOUNTER — TELEPHONE (OUTPATIENT)
Dept: ORTHOPEDICS | Facility: CLINIC | Age: 48
End: 2023-11-07
Payer: MEDICAID

## 2023-11-08 DIAGNOSIS — M25.572 ACUTE LEFT ANKLE PAIN: Primary | ICD-10-CM

## 2023-11-10 ENCOUNTER — HOSPITAL ENCOUNTER (OUTPATIENT)
Dept: RADIOLOGY | Facility: HOSPITAL | Age: 48
Discharge: HOME | End: 2023-11-10
Attending: ORTHOPAEDIC SURGERY
Payer: MEDICAID

## 2023-11-10 ENCOUNTER — OFFICE VISIT (OUTPATIENT)
Dept: ORTHOPEDICS | Facility: CLINIC | Age: 48
End: 2023-11-10
Payer: MEDICAID

## 2023-11-10 DIAGNOSIS — S86.312A STRAIN OF MUSCLE(S) AND TENDON(S) OF PERONEAL MUSCLE GROUP AT LOWER LEG LEVEL, LEFT LEG, INITIAL ENCOUNTER: ICD-10-CM

## 2023-11-10 DIAGNOSIS — M25.572 ACUTE LEFT ANKLE PAIN: ICD-10-CM

## 2023-11-10 DIAGNOSIS — S93.492A SPRAIN OF ANTERIOR TALOFIBULAR LIGAMENT OF LEFT ANKLE, INITIAL ENCOUNTER: Primary | ICD-10-CM

## 2023-11-10 PROCEDURE — 73610 X-RAY EXAM OF ANKLE: CPT | Mod: 26,LT,, | Performed by: RADIOLOGY

## 2023-11-10 PROCEDURE — 99204 OFFICE O/P NEW MOD 45 MIN: CPT | Mod: S$PBB,,, | Performed by: ORTHOPAEDIC SURGERY

## 2023-11-10 PROCEDURE — 99212 OFFICE O/P EST SF 10 MIN: CPT | Mod: PBBFAC,PO | Performed by: ORTHOPAEDIC SURGERY

## 2023-11-10 PROCEDURE — 99204 PR OFFICE/OUTPT VISIT, NEW, LEVL IV, 45-59 MIN: ICD-10-PCS | Mod: S$PBB,,, | Performed by: ORTHOPAEDIC SURGERY

## 2023-11-10 PROCEDURE — 99999 PR PBB SHADOW E&M-EST. PATIENT-LVL II: ICD-10-PCS | Mod: PBBFAC,,, | Performed by: ORTHOPAEDIC SURGERY

## 2023-11-10 PROCEDURE — 1159F MED LIST DOCD IN RCRD: CPT | Mod: CPTII,,, | Performed by: ORTHOPAEDIC SURGERY

## 2023-11-10 PROCEDURE — 73630 XR FOOT COMPLETE 3 VIEW LEFT: ICD-10-PCS | Mod: 26,LT,, | Performed by: RADIOLOGY

## 2023-11-10 PROCEDURE — 73630 X-RAY EXAM OF FOOT: CPT | Mod: 26,LT,, | Performed by: RADIOLOGY

## 2023-11-10 PROCEDURE — 73610 X-RAY EXAM OF ANKLE: CPT | Mod: TC,PO,LT

## 2023-11-10 PROCEDURE — 73610 XR ANKLE COMPLETE 3 VIEW LEFT: ICD-10-PCS | Mod: 26,LT,, | Performed by: RADIOLOGY

## 2023-11-10 PROCEDURE — 1160F RVW MEDS BY RX/DR IN RCRD: CPT | Mod: CPTII,,, | Performed by: ORTHOPAEDIC SURGERY

## 2023-11-10 PROCEDURE — 73630 X-RAY EXAM OF FOOT: CPT | Mod: TC,PO,LT

## 2023-11-10 PROCEDURE — 1159F PR MEDICATION LIST DOCUMENTED IN MEDICAL RECORD: ICD-10-PCS | Mod: CPTII,,, | Performed by: ORTHOPAEDIC SURGERY

## 2023-11-10 PROCEDURE — 99999 PR PBB SHADOW E&M-EST. PATIENT-LVL II: CPT | Mod: PBBFAC,,, | Performed by: ORTHOPAEDIC SURGERY

## 2023-11-10 PROCEDURE — 1160F PR REVIEW ALL MEDS BY PRESCRIBER/CLIN PHARMACIST DOCUMENTED: ICD-10-PCS | Mod: CPTII,,, | Performed by: ORTHOPAEDIC SURGERY

## 2023-11-10 RX ORDER — ASPIRIN 325 MG
TABLET, DELAYED RELEASE (ENTERIC COATED) ORAL
COMMUNITY

## 2023-11-10 RX ORDER — AMOXICILLIN AND CLAVULANATE POTASSIUM 875; 125 MG/1; MG/1
TABLET, FILM COATED ORAL
COMMUNITY
Start: 2023-11-10

## 2023-11-10 RX ORDER — DICLOFENAC SODIUM 75 MG/1
75 TABLET, DELAYED RELEASE ORAL 2 TIMES DAILY WITH MEALS
Qty: 60 TABLET | Refills: 2 | Status: SHIPPED | OUTPATIENT
Start: 2023-11-10 | End: 2024-02-18

## 2023-11-10 RX ORDER — BUDESONIDE AND FORMOTEROL FUMARATE DIHYDRATE 160; 4.5 UG/1; UG/1
AEROSOL RESPIRATORY (INHALATION)
COMMUNITY
Start: 2023-11-10

## 2023-11-10 RX ORDER — BENZONATATE 100 MG/1
100 CAPSULE ORAL
COMMUNITY
Start: 2023-09-13

## 2023-11-10 NOTE — PROGRESS NOTES
Status/Diagnosis: Left ankle ATFL sprain; peroneal tendon strain.  Date of Surgery: none  Date of Injury: 10/31/2023  Return visit: PRN  X-rays on Return: pending patient complaint    Chief Complaint:   Chief Complaint   Patient presents with    Left Ankle - Pain, Injury     Present History:  [facility]   Caridad De La Garza is a 48 y.o. female with with a 10 day history of severe lateral ankle/hindfoot pain.  Reports she was out trick or treating with her kids when she sustained what sounds like an inversion-type left foot/ankle injury.  Immediate lateral based ankle pain and swelling.  Seen in the ED on 11/02.  X-rays at that time were read as negative.  She was placed into a short boot with instructions for outpatient orthopedic follow-up.  Denies any pain or instability about the ankle prior to the above.    Boot has provided moderate symptomatic relief.  Currently taking over-the-counter oral NSAIDs as needed with mild relief.  No subjective ankle instability.  Pain mostly localized along the posterolateral ankle spanning along the course of the peroneal tendons all the way up to the myotendinous junction.  Denies any numbness or tingling.    Past medical history significant for hypertension and anemia.  Denies tobacco use.  Remains active.      Past Medical History:   Diagnosis Date    Abnormal Pap smear of cervix     rechecked and came back normal     Anemia     Endometriosis     GERD (gastroesophageal reflux disease)     Hypertension in pregnancy, preeclampsia, delivered     controled with diet    PONV (postoperative nausea and vomiting)        Past Surgical History:   Procedure Laterality Date    CYSTOSCOPY N/A 10/30/2020    Procedure: CYSTOSCOPY;  Surgeon: Candace Sims MD;  Location: [facility];  Service: OB/GYN;  Laterality: N/A;    HYSTERECTOMY N/A 10/30/2020    total    LAPAROSCOPIC TOTAL HYSTERECTOMY N/A 10/30/2020    Procedure: HYSTERECTOMY, TOTAL,  LAPAROSCOPIC;  Surgeon: Candace Sims MD;  Location: [facility];  Service: OB/GYN;  Laterality: N/A;    PELVIC LAPAROSCOPY  2003    [facility] [place]- endometriosis    salpingectomy and endometrial ablation  11/2017       Current Outpatient Medications   Medication Sig    albuterol (PROVENTIL/VENTOLIN HFA) 90 mcg/actuation inhaler Inhale 2 puffs into the lungs every 6 (six) hours as needed for Wheezing. Rescue    amoxicillin-clavulanate 875-125mg (AUGMENTIN) 875-125 mg per tablet     benzonatate (TESSALON) 100 MG capsule Take 100 mg by mouth.    budesonide-formoterol 160-4.5 mcg (SYMBICORT) 160-4.5 mcg/actuation HFAA 2 puffs Inhalation Twice a day for 30 days    cholecalciferol, vitamin D3, 1,250 mcg (50,000 unit) capsule TAKE 1 CAPSULE BY MOUTH 1 TIME WEEKLY for 84    FLONASE ALLERGY RELIEF 50 mcg/actuation nasal spray 2 sprays (100 mcg total) by Each Nostril route once daily.    azelastine (ASTELIN) 137 mcg (0.1 %) nasal spray 1 spray (137 mcg total) by Nasal route 2 (two) times daily.    diclofenac (VOLTAREN) 75 MG EC tablet Take 1 tablet (75 mg total) by mouth 2 (two) times daily with meals.    pantoprazole (PROTONIX) 40 MG tablet Take 1 tablet (40 mg total) by mouth once daily.     No current facility-administered medications for this visit.       Review of patient's allergies indicates:   Allergen Reactions    Aspirin Rash       Family History   Problem Relation Age of Onset    Hypertension Father     Allergies Mother     Allergic rhinitis Mother     Allergies Sister     Allergic rhinitis Sister     Breast cancer Maternal Cousin     Breast cancer Maternal Cousin     Ovarian cancer Neg Hx     Cervical cancer Neg Hx     Endometrial cancer Neg Hx     Vaginal cancer Neg Hx     Angioedema Neg Hx     Asthma Neg Hx     Atopy Neg Hx     Eczema Neg Hx     Immunodeficiency Neg Hx     Rhinitis Neg Hx     Urticaria Neg Hx        Social History     Socioeconomic History    Marital status:    Tobacco Use    Smoking  status: Former     Current packs/day: 0.00     Average packs/day: 0.5 packs/day for 27.5 years (13.7 ttl pk-yrs)     Types: Cigarettes     Start date: 1/1/1993     Quit date: 7/1/2020     Years since quitting: 3.3    Smokeless tobacco: Never   Substance and Sexual Activity    Alcohol use: Yes     Alcohol/week: 0.0 standard drinks of alcohol     Comment: occasional    Drug use: No    Sexual activity: Yes     Partners: Male     Birth control/protection: None     Social Determinants of Health     Financial Resource Strain: Medium Risk (6/21/2022)    Overall Financial Resource Strain (CARDIA)     Difficulty of Paying Living Expenses: Somewhat hard   Food Insecurity: No Food Insecurity (6/21/2022)    Hunger Vital Sign     Worried About Running Out of Food in the Last Year: Never true     Ran Out of Food in the Last Year: Never true   Transportation Needs: No Transportation Needs (6/21/2022)    PRAPARE - Transportation     Lack of Transportation (Medical): No     Lack of Transportation (Non-Medical): No   Physical Activity: Inactive (6/21/2022)    Exercise Vital Sign     Days of Exercise per Week: 3 days     Minutes of Exercise per Session: 0 min   Stress: Stress Concern Present (6/21/2022)    [employer] - Occupational Stress Questionnaire     Feeling of Stress : To some extent   Social Connections: Unknown (6/21/2022)    Social Connection and Isolation Panel [NHANES]     Frequency of Communication with Friends and Family: More than three times a week     Frequency of Social Gatherings with Friends and Family: More than three times a week     Active Member of Clubs or Organizations: No     Attends Club or Organization Meetings: Never     Marital Status:    Housing Stability: High Risk (6/21/2022)    Housing Stability Vital Sign     Unable to Pay for Housing in the Last Year: Yes     Number of Places Lived in the Last Year: 1     Unstable Housing in the Last Year: No       Physical  exam:  There were no vitals filed for this visit.  There is no height or weight on file to calculate BMI.  General: In no apparent distress; well developed and well nourished.  HEENT: normocephalic; atraumatic.  Cardiovascular: regular rate.  Respiratory: no increased work of breathing.  Musculoskeletal:   Gait: mild antalgic  Inspection:   Patient with moderate to severe residual lateral based ankle/hindfoot swelling.  No significant ecchymosis.  Patient was exquisitely tender spanning from the distal margin of the peroneal muscle belly distally to the retro malleolar groove.  No significant tenderness from the tubercle to the 5th metatarsal base.  Moderate tenderness but to a lesser degree at the ATFL origin.  No medial based ankle swelling or tenderness noted.  No pain referable to the foot.  No pain or laxity with Lisfranc stress.  1+ laxity with anterior drawer testing.  Silfverskiold: Negative  Alignment:  Knee: neutral               Ankle: neutral              Hindfoot: subtle cavus              Forefoot: neutral   Strength:              Dorsiflexion 5/5  Plantar flexion 5/5  Inversion 5/5  Eversion 5/5 with minimal pain  Sensation:              SILT distally  ROM:              Ankle: near full with pain at extremes              Subtalar: near full with pain at extremes  Pulses: Palpable pedal pulse                   Imaging Studies/Outside documentation:  I have ordered/reviewed/interpreted the following images/outside documentation:  1. Weight-bearing 3-views of Left foot and ankle:   On my independent review, no acute bony abnormality noted.  Moderate increased calcaneal pitch.  Large type 3 accessory navicular.  Some degree of talonavicular over coverage.  Mild hallux valgus.  Ankle mortise remains congruent.  No significant degenerative joint changes.        Assessment:  Caridad De La Garza is a 48 y.o. female with Left ankle ATFL sprain; peroneal tendon strain.     Plan:   Clinical and radiographic  findings were discussed.  Recommend conservative management to include rest, ice, compression, elevation.    We will provide a prescription for oral diclofenac today.  She was to initiate this after completing a recently obtained Medrol Dosepak for chronic upper respiratory symptoms.    We will also have patient fitted for a lace-up ankle brace.  May transition from the boot to the brace over the next week.  Referral placed for physical therapy per low ankle sprain and peroneal tendon strain protocol.  Patient voiced understanding.  All questions were answered.  She will call regarding future follow-up should symptoms persist or worsen.  Consider MRI left ankle without contrast for further evaluation.      We performed a custom orthotic/brace fitting, adjusting and training with the patient. The patient demonstrated understanding and proper care. This was performed for 15 minutes.    This note was created using voice recognition software and may contain grammatical errors.

## 2024-02-16 DIAGNOSIS — S86.312A STRAIN OF MUSCLE(S) AND TENDON(S) OF PERONEAL MUSCLE GROUP AT LOWER LEG LEVEL, LEFT LEG, INITIAL ENCOUNTER: ICD-10-CM

## 2024-02-16 DIAGNOSIS — S93.492A SPRAIN OF ANTERIOR TALOFIBULAR LIGAMENT OF LEFT ANKLE, INITIAL ENCOUNTER: ICD-10-CM

## 2024-02-18 RX ORDER — DICLOFENAC SODIUM 75 MG/1
TABLET, DELAYED RELEASE ORAL
Qty: 60 TABLET | Refills: 2 | Status: SHIPPED | OUTPATIENT
Start: 2024-02-18

## 2024-03-30 NOTE — Clinical Note
Patient remains tobacco free and taking Wellbutrin with no negative side effects at this time.
none stated

## 2024-07-12 PROBLEM — J45.909 ASTHMA: Status: ACTIVE | Noted: 2024-07-12

## 2025-01-31 ENCOUNTER — OFFICE VISIT (OUTPATIENT)
Dept: NEUROLOGY | Facility: CLINIC | Age: 50
End: 2025-01-31
Payer: COMMERCIAL

## 2025-01-31 VITALS
BODY MASS INDEX: 33.57 KG/M2 | HEART RATE: 102 BPM | SYSTOLIC BLOOD PRESSURE: 134 MMHG | RESPIRATION RATE: 17 BRPM | TEMPERATURE: 97 F | HEIGHT: 65 IN | DIASTOLIC BLOOD PRESSURE: 89 MMHG | WEIGHT: 201.5 LBS

## 2025-01-31 DIAGNOSIS — G43.719 INTRACTABLE CHRONIC MIGRAINE WITHOUT AURA AND WITHOUT STATUS MIGRAINOSUS: Primary | ICD-10-CM

## 2025-01-31 PROCEDURE — 99205 OFFICE O/P NEW HI 60 MIN: CPT | Mod: S$GLB,,, | Performed by: NURSE PRACTITIONER

## 2025-01-31 PROCEDURE — 3075F SYST BP GE 130 - 139MM HG: CPT | Mod: CPTII,S$GLB,, | Performed by: NURSE PRACTITIONER

## 2025-01-31 PROCEDURE — 3079F DIAST BP 80-89 MM HG: CPT | Mod: CPTII,S$GLB,, | Performed by: NURSE PRACTITIONER

## 2025-01-31 PROCEDURE — 1160F RVW MEDS BY RX/DR IN RCRD: CPT | Mod: CPTII,S$GLB,, | Performed by: NURSE PRACTITIONER

## 2025-01-31 PROCEDURE — 1159F MED LIST DOCD IN RCRD: CPT | Mod: CPTII,S$GLB,, | Performed by: NURSE PRACTITIONER

## 2025-01-31 PROCEDURE — 99999 PR PBB SHADOW E&M-EST. PATIENT-LVL V: CPT | Mod: PBBFAC,,, | Performed by: NURSE PRACTITIONER

## 2025-01-31 PROCEDURE — 3008F BODY MASS INDEX DOCD: CPT | Mod: CPTII,S$GLB,, | Performed by: NURSE PRACTITIONER

## 2025-01-31 RX ORDER — SUMATRIPTAN SUCCINATE 50 MG/1
50 TABLET ORAL
Qty: 10 TABLET | Refills: 11 | Status: SHIPPED | OUTPATIENT
Start: 2025-01-31 | End: 2025-03-02

## 2025-01-31 RX ORDER — BUTALBITAL AND ACETAMINOPHEN 325; 50 MG/1; MG/1
1 TABLET ORAL
COMMUNITY

## 2025-01-31 RX ORDER — PREDNISONE 10 MG/1
TABLET ORAL
Qty: 20 TABLET | Refills: 0 | Status: SHIPPED | OUTPATIENT
Start: 2025-01-31

## 2025-01-31 NOTE — PROGRESS NOTES
Date of service: 1/31/2025  Referring provider: [facility]    Subjective:      Chief complaint: Headache       Patient ID: Caridad De La Garza is a 49 y.o. who presents today as a new patient for headache.     History of Present Illness    ORIGINAL HEADACHE HISTORY -   Age at onset and course over time: onset age 16. She typically has one migraine per week. However, she reports increase in headache frequency, duration and intensity over the last few months.  She had 5 episodes of increased right orbital pressure followed by subconjunctival hemorrhage. She also has complaints of fatigue, trouble concentrating and processing information at work, feeling overwhelmed. Today, she reports daily headache with escalation to 10/10 pain with associated features 4-6 times per week. She does report increase in stress at work.     Location: frontal, cervical, right orbital   Quality:  [] Stabbing [x] Pressure [] Tight [x] Throbbing/pounding [] Sharp    Duration: [x] Seconds [x] Minutes [x] Hours [x] Days [x] Constant   Frequency: [x] Daily [] Weekly [] Monthly   How many days per month is your head or neck 100% pain free: 0  Headaches awaken at night?:   3  Worst time of day: varies   Intensity of pain: at best 2/10, at worst 10/10   Associated with: [x] Photophobia [x]  Phonophobia [x] Osmophobia [x] Loss of appetite [x] Nausea [] Vomiting   [x] Dizziness [x] Vertigo [x] Ringing in the ears [x] Blurry vision [] Double vision  [x] Anxiety/Anger/Irritability [x] Problems with concentration [] Problems with memory [x] Problems with task completion   [x] Problems with relaxation [x] Neck tightness/ neck pain [x] Nasal congestion [x] Nasal or sinus pressure [] Aura   Alleviated by:  [x] Sleep [x] Darkness [x] Local pressure [x] Massage [] Heat [] Ice [] Menses [x] Medication  Exacerbated by:  [x] Fatigue [x] Light [x] Noise [x] Smells [x] Coughing [x] Sneezing  [x] Bending over [x] Change in weather [] Ovulation [] Menses []  Alcohol [x] Stress []  Food  Ipsilateral autonomic: [] nasal congestion [] lacrimation [] ptosis [] injection [] edema [] foreign body sensation [] ear fullness   ICP:  [] transient visual obscurations  [x] tinnitus - high pitched   [] positional headache  [] non-positional     Bowl Habits: [] Normal [x] Constipation [] Diarrhea   Caffeine intake: 2 cup coffee   Sleep habits: trouble falling asleep   Water intake:  3-4 cups per day   Eye Exam: up to date   Family history of migraine: none   Gyn status (if female) (birth control with estrogen, hysterectomy): hysterectomy   History of asthma, cancer, glaucoma, kidney stones, CVA and osteoporosis: asthma (inhaler once a month),     HIT 6: 74    Current acute treatment:  Midol  Excedrin    Current prevention:  None     Previously tried/failed acute treatment:  Fioricet     Previously tried/failed preventative treatment:  None     Considerations:     Review of patient's allergies indicates:   Allergen Reactions    Aspirin Rash     Current Outpatient Medications   Medication Sig Dispense Refill    albuterol (PROVENTIL/VENTOLIN HFA) 90 mcg/actuation inhaler Inhale 2 puffs into the lungs every 6 (six) hours as needed for Wheezing. Rescue 18 g 2    amoxicillin-clavulanate 875-125mg (AUGMENTIN) 875-125 mg per tablet       benzonatate (TESSALON) 100 MG capsule Take 100 mg by mouth.      budesonide-formoterol 160-4.5 mcg (SYMBICORT) 160-4.5 mcg/actuation HFAA 2 puffs Inhalation Twice a day for 30 days      butalbital-acetaminophen-caffeine -40 mg (FIORICET, ESGIC) -40 mg per tablet Take 1 tablet by mouth every 4 (four) hours as needed. 10 tablet 0    cholecalciferol, vitamin D3, 1,250 mcg (50,000 unit) capsule TAKE 1 CAPSULE BY MOUTH 1 TIME WEEKLY for 84      diclofenac (VOLTAREN) 75 MG EC tablet TAKE 1 TABLET(75 MG) BY MOUTH TWICE DAILY WITH MEALS 60 tablet 2    FLONASE ALLERGY RELIEF 50 mcg/actuation nasal spray 2 sprays (100 mcg total) by Each Nostril route once  daily. 15.8 mL 2    ondansetron (ZOFRAN-ODT) 4 MG TbDL Take 1 tablet (4 mg total) by mouth every 6 (six) hours as needed. 12 tablet 0    predniSONE (DELTASONE) 10 MG tablet Take 1 tablet (10 mg total) by mouth once daily. Take 4 tabs x 3 days, then take 2 tabs x 3 days, then take 1 tab x 3 days. 21 tablet 0    azelastine (ASTELIN) 137 mcg (0.1 %) nasal spray 1 spray (137 mcg total) by Nasal route 2 (two) times daily. 30 mL 3    butalbitaL-acetaminophen  mg Tab Take 1 tablet by mouth as needed (headache).      pantoprazole (PROTONIX) 40 MG tablet Take 1 tablet (40 mg total) by mouth once daily. 90 tablet 3    predniSONE (DELTASONE) 10 MG tablet 4 tab PO in AM x 2 days, 3 tab in AM x2 days, 2 tab in AM x 2 days, 1 tab in AM x 2 days then stop 20 tablet 0    sumatriptan (IMITREX) 50 MG tablet Take 1 tablet (50 mg total) by mouth as needed for Migraine. 10 tablet 11     No current facility-administered medications for this visit.       Past Medical History  Past Medical History:   Diagnosis Date    Abnormal Pap smear of cervix     rechecked and came back normal     Anemia     Endometriosis     GERD (gastroesophageal reflux disease)     Headache     Hypertension in pregnancy, preeclampsia, delivered     controled with diet    PONV (postoperative nausea and vomiting)        Past Surgical History  Past Surgical History:   Procedure Laterality Date    CYSTOSCOPY N/A 10/30/2020    Procedure: CYSTOSCOPY;  Surgeon: Candace Sims MD;  Location: [facility] OR;  Service: OB/GYN;  Laterality: N/A;    HYSTERECTOMY N/A 10/30/2020    total    LAPAROSCOPIC TOTAL HYSTERECTOMY N/A 10/30/2020    Procedure: HYSTERECTOMY, TOTAL, LAPAROSCOPIC;  Surgeon: Candace Sims MD;  Location: [facility] OR;  Service: OB/GYN;  Laterality: N/A;    PELVIC LAPAROSCOPY  2003    [facility] [place]- [place]    salpingectomy and endometrial ablation  11/2017       Family History  Family History   Problem Relation Name Age of Onset    Hypertension Father       Allergies Mother      Allergic rhinitis Mother      Allergies Sister      Allergic rhinitis Sister      Breast cancer Maternal Cousin      Breast cancer Maternal Cousin      Ovarian cancer Neg Hx      Cervical cancer Neg Hx      Endometrial cancer Neg Hx      Vaginal cancer Neg Hx      Angioedema Neg Hx      Asthma Neg Hx      Atopy Neg Hx      Eczema Neg Hx      Immunodeficiency Neg Hx      Rhinitis Neg Hx      Urticaria Neg Hx         Social History  Social History     Socioeconomic History    Marital status:    Tobacco Use    Smoking status: Former     Current packs/day: 0.00     Average packs/day: 0.5 packs/day for 27.5 years (13.7 ttl pk-yrs)     Types: Cigarettes     Start date: 1993     Quit date: 2020     Years since quittin.5    Smokeless tobacco: Never   Substance and Sexual Activity    Alcohol use: Yes     Alcohol/week: 0.0 standard drinks of alcohol     Comment: occasional    Drug use: No    Sexual activity: Yes     Partners: Male     Birth control/protection: None     Social Drivers of Health     Financial Resource Strain: Low Risk  (2025)    Overall Financial Resource Strain (CARDIA)     Difficulty of Paying Living Expenses: Not hard at all   Food Insecurity: No Food Insecurity (2025)    Hunger Vital Sign     Worried About Running Out of Food in the Last Year: Never true     Ran Out of Food in the Last Year: Never true   Transportation Needs: No Transportation Needs (2022)    PRAPARE - Transportation     Lack of Transportation (Medical): No     Lack of Transportation (Non-Medical): No   Physical Activity: Insufficiently Active (2025)    Exercise Vital Sign     Days of Exercise per Week: 4 days     Minutes of Exercise per Session: 30 min   Stress: Stress Concern Present (2025)    [employer] - Occupational Stress Questionnaire     Feeling of Stress : Rather much   Housing Stability: High Risk (2022)    Housing Stability  Vital Sign     Unable to Pay for Housing in the Last Year: Yes     Number of Places Lived in the Last Year: 1     Unstable Housing in the Last Year: No        Review of Systems  14-point review of systems as follows:   No check blanca indicates NEGATIVE response   Constitutional: [] weight loss [] change to appetite   Eyes: [] change in vision [] double vision   Ears, nose, mouth, throat: [] frequent nose bleeds [] ringing in the ears   Respiratory: [] cough [] wheezing   Cardiovascular: [] chest pain [] palpitations   Gastrointestinal: [] jaundice [] nausea/vomiting   Genitourinary: [] incontinence [] burning with urination   Hematologic/lymphatic: [] easy bruising/bleeding [] night sweats   Neurological: [] numbness [] weakness   Endocrine: [] fatigue [] heat/cold intolerance   Allergy/Immunologic: [] fevers [] chills   Musculoskeletal: [] muscle pain [] joint pain   Psychiatric: [] thoughts of harming self/others [] depression   Integumentary: [] rashes [] sores that do not heal     Objective:        Vitals:    01/31/25 0829   BP: 134/89   Pulse: 102   Resp: 17   Temp: 97.3 °F (36.3 °C)     Body mass index is 34.05 kg/m².    Constitutional: appears in no acute distress, well-developed, well-nourished     Eyes: normal conjunctiva, PERRLA    Ears, nose, mouth, throat: external appearance of ears and nose normal, hearing intact     Cardiovascular: n/a     Respiratory: unlabored respirations    Gastrointestinal: no visible abdominal masses, no guarding, no visible hernia    Musculoskeletal: normal tone in all four extremities. No abnormal movements. No pronator drift. No orbit. Symmetric finger tapping. Normal station. Normal regular gait.       Spine:   CERVICAL SPINE:  ROM: normal   MUSCLE SPASM: no   FACET LOADING: no   SPURLING: no  LEFTY / LISA tender: no     Psychiatric: normal judgment and insight. Oriented to person, place, and time.     Neurologic:   Cortical functions: recent and remote memory intact, normal  attention span and concentration, speech fluent, adequate fund of knowledge   Cranial nerves: visual fields full, PERRLA, EOMI, symmetric facial strength, hearing intact, palate elevates symmetrically, shoulder shrug 5/5, tongue protrudes midline   Reflexes: 2+ in the upper and lower extremities, no Vance  Sensation: intact to temperature throughout   Coordination: normal finger to nose, heel to shin, tandem gait     Data Review:     I have personally reviewed the referring provider's notes, labs, & imaging made available to me today.      RADIOLOGY STUDIES:  I have personally reviewed the pertinent images performed.       Results for orders placed or performed during the hospital encounter of 01/13/25   CT Head Without Contrast    Narrative    EXAMINATION:  CT HEAD WITHOUT CONTRAST    CLINICAL HISTORY:  Headache    TECHNIQUE:  Axial CT images were obtained through the head without contrast.  Coronal and sagittal reformations were also performed.  Total .  Automated exposure control utilized.    COMPARISON:  08/26/2021    FINDINGS:  No hemorrhage, mass effect or CT evidence of acute cortical infarction.  Brain parenchyma has a normal attenuation.  Ventricles and sulci are proportionate.    No abnormal extra-axial fluid collections.    No hyperdense artery or vein.    No skull fracture or aggressive osseous process.  Visualized paranasal sinuses and mastoid air cells are clear.      Impression    No acute intracranial findings      Electronically signed by: Eder Bass MD  Date:    01/13/2025  Time:    19:24       Lab Results   Component Value Date     06/21/2022    K 4.0 06/21/2022     06/21/2022    CO2 23 06/21/2022    BUN 7 06/21/2022    CREATININE 0.6 06/21/2022    GLU 91 06/21/2022    HGBA1C 5.4 06/21/2022    AST 42 (H) 06/21/2022    ALT 52 (H) 06/21/2022    ALBUMIN 3.7 06/21/2022    PROT 7.6 06/21/2022    BILITOT 0.5 06/21/2022    CHOL 168 06/21/2022    HDL 41 06/21/2022    LDLCALC 107.8  06/21/2022    TRIG 96 06/21/2022       Lab Results   Component Value Date    WBC 6.15 06/21/2022    HGB 13.4 06/21/2022    HCT 39.8 06/21/2022    MCV 89 06/21/2022     06/21/2022       Lab Results   Component Value Date    TSH 0.580 06/21/2022           Assessment & Plan:       Problem List Items Addressed This Visit       Intractable chronic migraine without aura and without status migrainosus - Primary    Overview     Headaches are typically unilateral, moderate to severe in intensity, worsen with activity, pounding in quality and associated with sensitivity to light and sound.     MRI Brain with and without contrast due to increase in frequency, duration and intensity of chronic headaches.    Start Prednisone taper to help break current headache cycle.    Start Magnesium, Riboflavin and CoQ10 daily as she wishes conservative measures at this time. Start Imitrex 50 mg as needed. Ok to repeat dose 2 hours later. No more than 200 mg per 24 hours. Headache journal.          Relevant Medications    predniSONE (DELTASONE) 10 MG tablet    sumatriptan (IMITREX) 50 MG tablet    Other Relevant Orders    MRI Brain W WO Contrast           Please call our clinic at [phone] or send a message on the [facility] portal if there are any changes to the plan described below, for example,if you are not contacted for the requested tests, referral(s) within one week, if you are unable to receive the medications prescribed, or if you feel you need to change the treatment course for any reason.     TESTING: MRI Brain with and without contrast due to increase in frequency, duration and intensity of chronic headaches    REFERRALS: none     PREVENTION (use daily regardless of headache):  - Start Magnesium in ONE of the following preparations -               1. Magnesium oxide 800mg nightly (the most common over the counter kind, may causes loose stools)              2. Magnesium citrate 400-500mg nightly (harder to find, but more  neutral on the bowels)              3. Magnesium glycinate 400mg nightly (hardest to find, look online, but most bowel-neutral, best absorbed)   - Start Riboflavin and CoQ10 daily  - Consider alternative in the future - wishes to be conservative at this time     AS-NEEDED TREATMENT (use total no more than 10 days per month unless otherwise stated):  - Start Imitrex 50 mg as needed. Ok to repeat dose 2 hours later. No more than 200 mg per 24 hours.   - Start Prednisone taper to help break current headache cycle     OTHER:   - Headache journal       Follow up in about 8 weeks (around 3/28/2025).       DAV YanezC      I have spent 60 minutes of total time on the total encounter which includes face to face time and non-face to face time preparing to see the patient (eg, review of labs, previous encounters, care everywhere), obtaining and/or reviewing separately obtained history, documenting clinical information in the electronic or health record, independently interpreting results, and communicating results to the patient/family/caregiver, or care coordination.

## 2025-02-03 ENCOUNTER — OFFICE VISIT (OUTPATIENT)
Dept: OBSTETRICS AND GYNECOLOGY | Facility: CLINIC | Age: 50
End: 2025-02-03
Payer: COMMERCIAL

## 2025-02-03 ENCOUNTER — HOSPITAL ENCOUNTER (OUTPATIENT)
Dept: RADIOLOGY | Facility: HOSPITAL | Age: 50
Discharge: HOME | End: 2025-02-03
Attending: NURSE PRACTITIONER
Payer: COMMERCIAL

## 2025-02-03 VITALS
DIASTOLIC BLOOD PRESSURE: 78 MMHG | WEIGHT: 201.25 LBS | SYSTOLIC BLOOD PRESSURE: 120 MMHG | BODY MASS INDEX: 34.02 KG/M2

## 2025-02-03 DIAGNOSIS — Z01.419 ENCOUNTER FOR GYNECOLOGICAL EXAMINATION (GENERAL) (ROUTINE) WITHOUT ABNORMAL FINDINGS: Primary | ICD-10-CM

## 2025-02-03 DIAGNOSIS — Z11.3 SCREEN FOR STD (SEXUALLY TRANSMITTED DISEASE): ICD-10-CM

## 2025-02-03 DIAGNOSIS — G43.719 INTRACTABLE CHRONIC MIGRAINE WITHOUT AURA AND WITHOUT STATUS MIGRAINOSUS: ICD-10-CM

## 2025-02-03 PROCEDURE — 99999 PR PBB SHADOW E&M-EST. PATIENT-LVL III: CPT | Mod: PBBFAC,,, | Performed by: OBSTETRICS & GYNECOLOGY

## 2025-02-03 PROCEDURE — 3078F DIAST BP <80 MM HG: CPT | Mod: CPTII,S$GLB,, | Performed by: OBSTETRICS & GYNECOLOGY

## 2025-02-03 PROCEDURE — 3008F BODY MASS INDEX DOCD: CPT | Mod: CPTII,S$GLB,, | Performed by: OBSTETRICS & GYNECOLOGY

## 2025-02-03 PROCEDURE — 25500020 PHARM REV CODE 255: Mod: PO | Performed by: NURSE PRACTITIONER

## 2025-02-03 PROCEDURE — 81515 NFCT DS BV&VAGINITIS DNA ALG: CPT | Performed by: OBSTETRICS & GYNECOLOGY

## 2025-02-03 PROCEDURE — A9585 GADOBUTROL INJECTION: HCPCS | Mod: PO | Performed by: NURSE PRACTITIONER

## 2025-02-03 PROCEDURE — 99386 PREV VISIT NEW AGE 40-64: CPT | Mod: S$GLB,,, | Performed by: OBSTETRICS & GYNECOLOGY

## 2025-02-03 PROCEDURE — 70553 MRI BRAIN STEM W/O & W/DYE: CPT | Mod: 26,,, | Performed by: RADIOLOGY

## 2025-02-03 PROCEDURE — 3074F SYST BP LT 130 MM HG: CPT | Mod: CPTII,S$GLB,, | Performed by: OBSTETRICS & GYNECOLOGY

## 2025-02-03 PROCEDURE — 70553 MRI BRAIN STEM W/O & W/DYE: CPT | Mod: TC,PO

## 2025-02-03 PROCEDURE — 1159F MED LIST DOCD IN RCRD: CPT | Mod: CPTII,S$GLB,, | Performed by: OBSTETRICS & GYNECOLOGY

## 2025-02-03 PROCEDURE — 87491 CHLMYD TRACH DNA AMP PROBE: CPT | Performed by: OBSTETRICS & GYNECOLOGY

## 2025-02-03 RX ORDER — GADOBUTROL 604.72 MG/ML
9 INJECTION INTRAVENOUS
Status: COMPLETED | OUTPATIENT
Start: 2025-02-03 | End: 2025-02-03

## 2025-02-03 RX ADMIN — GADOBUTROL 9 ML: 604.72 INJECTION INTRAVENOUS at 12:02

## 2025-02-03 NOTE — PROGRESS NOTES
History and Physical:  Caridad De La Garza is a 49 y.o. F who presents today for her routine annual GYN exam. The patient has no Gynecology complaints.    Annual:   Patient's last menstrual period was 12/01/2020.  Menopause: 2020 TLH due to abnormal uterine bleeding, path adenomyosis & endometriosis.   Last Pap: 2016 HPV neg.   History of abnormal pap: yes, biopsy was normal. Denies excision.   Last Mammogram: 2/2025 pending results  Colonosocpy: Cologuard 8/2022 neg; next 8/2025  PCP: No, Primary Doctor Recent labs with PCP.   Immunization status: up to date and documented.     Allergies:   Review of patient's allergies indicates:   Allergen Reactions    Aspirin Rash     Past Medical History:   Diagnosis Date    Abnormal Pap smear of cervix     rechecked and came back normal     Anemia     Endometriosis     GERD (gastroesophageal reflux disease)     Headache     Hypertension in pregnancy, preeclampsia, delivered     controled with diet    PONV (postoperative nausea and vomiting)      Past Surgical History:   Procedure Laterality Date    CYSTOSCOPY N/A 10/30/2020    Procedure: CYSTOSCOPY;  Surgeon: Candace Sims MD;  Location: [facility] OR;  Service: OB/GYN;  Laterality: N/A;    HYSTERECTOMY N/A 10/30/2020    total    LAPAROSCOPIC TOTAL HYSTERECTOMY N/A 10/30/2020    Procedure: HYSTERECTOMY, TOTAL, LAPAROSCOPIC;  Surgeon: Candace Sims MD;  Location: [facility] OR;  Service: OB/GYN;  Laterality: N/A;    PELVIC LAPAROSCOPY  2003    [facility] in [place]- endometriosis    salpingectomy and endometrial ablation  11/2017     MEDS:   Current Outpatient Medications on File Prior to Visit   Medication Sig Dispense Refill    albuterol (PROVENTIL/VENTOLIN HFA) 90 mcg/actuation inhaler Inhale 2 puffs into the lungs every 6 (six) hours as needed for Wheezing. Rescue 18 g 2    amoxicillin-clavulanate 875-125mg (AUGMENTIN) 875-125 mg per tablet       benzonatate (TESSALON) 100 MG capsule Take 100 mg by mouth.      budesonide-formoterol  160-4.5 mcg (SYMBICORT) 160-4.5 mcg/actuation HFAA 2 puffs Inhalation Twice a day for 30 days      butalbitaL-acetaminophen  mg Tab Take 1 tablet by mouth as needed (headache).      butalbital-acetaminophen-caffeine -40 mg (FIORICET, ESGIC) -40 mg per tablet Take 1 tablet by mouth every 4 (four) hours as needed. 10 tablet 0    cholecalciferol, vitamin D3, 1,250 mcg (50,000 unit) capsule TAKE 1 CAPSULE BY MOUTH 1 TIME WEEKLY for 84      diclofenac (VOLTAREN) 75 MG EC tablet TAKE 1 TABLET(75 MG) BY MOUTH TWICE DAILY WITH MEALS 60 tablet 2    FLONASE ALLERGY RELIEF 50 mcg/actuation nasal spray 2 sprays (100 mcg total) by Each Nostril route once daily. 15.8 mL 2    ondansetron (ZOFRAN-ODT) 4 MG TbDL Take 1 tablet (4 mg total) by mouth every 6 (six) hours as needed. 12 tablet 0    predniSONE (DELTASONE) 10 MG tablet Take 1 tablet (10 mg total) by mouth once daily. Take 4 tabs x 3 days, then take 2 tabs x 3 days, then take 1 tab x 3 days. 21 tablet 0    predniSONE (DELTASONE) 10 MG tablet 4 tab PO in AM x 2 days, 3 tab in AM x2 days, 2 tab in AM x 2 days, 1 tab in AM x 2 days then stop 20 tablet 0    sumatriptan (IMITREX) 50 MG tablet Take 1 tablet (50 mg total) by mouth as needed for Migraine. 10 tablet 11    azelastine (ASTELIN) 137 mcg (0.1 %) nasal spray 1 spray (137 mcg total) by Nasal route 2 (two) times daily. 30 mL 3    pantoprazole (PROTONIX) 40 MG tablet Take 1 tablet (40 mg total) by mouth once daily. 90 tablet 3     No current facility-administered medications on file prior to visit.     OB History          4    Para   2    Term   2       0    AB   2    Living   2         SAB        IAB   2    Ectopic        Multiple        Live Births   2           Obstetric Comments   8-20-15 episiotomy performed              Social History     Socioeconomic History    Marital status:    Tobacco Use    Smoking status: Former     Current packs/day: 0.00     Average packs/day: 0.5  packs/day for 27.5 years (13.7 ttl pk-yrs)     Types: Cigarettes     Start date: 1993     Quit date: 2020     Years since quittin.5    Smokeless tobacco: Never   Substance and Sexual Activity    Alcohol use: Yes     Alcohol/week: 0.0 standard drinks of alcohol     Comment: occasional    Drug use: No    Sexual activity: Yes     Partners: Male     Birth control/protection: None     Social Drivers of Health     Financial Resource Strain: Low Risk  (2025)    Overall Financial Resource Strain (CARDIA)     Difficulty of Paying Living Expenses: Not hard at all   Food Insecurity: No Food Insecurity (2025)    Hunger Vital Sign     Worried About Running Out of Food in the Last Year: Never true     Ran Out of Food in the Last Year: Never true   Transportation Needs: No Transportation Needs (2022)    PRAPARE - Transportation     Lack of Transportation (Medical): No     Lack of Transportation (Non-Medical): No   Physical Activity: Insufficiently Active (2025)    Exercise Vital Sign     Days of Exercise per Week: 4 days     Minutes of Exercise per Session: 30 min   Stress: Stress Concern Present (2025)    [employer] - Occupational Stress Questionnaire     Feeling of Stress : Rather much   Housing Stability: High Risk (2022)    Housing Stability Vital Sign     Unable to Pay for Housing in the Last Year: Yes     Number of Places Lived in the Last Year: 1     Unstable Housing in the Last Year: No     Family History   Problem Relation Name Age of Onset    Hypertension Father      Allergies Mother      Allergic rhinitis Mother      Allergies Sister      Allergic rhinitis Sister      Breast cancer Maternal Cousin      Breast cancer Maternal Cousin      Ovarian cancer Neg Hx      Cervical cancer Neg Hx      Endometrial cancer Neg Hx      Vaginal cancer Neg Hx      Angioedema Neg Hx      Asthma Neg Hx      Atopy Neg Hx      Eczema Neg Hx      Immunodeficiency Neg Hx       Rhinitis Neg Hx      Urticaria Neg Hx         Past medical and surgical history reviewed.   I have reviewed the patient's medical history in detail and updated the computerized patient record.    Review of System:   General: no chills, fever, night sweats, weight gain or weight loss  Breasts: no new or changing breast lumps, nipple discharge or masses.  Gastrointestinal: no abdominal pain, change in bowel habits, or black or bloody stools  Genito-Urinary: no incontinence, urinary frequency/urgency or vulvar/vaginal symptoms, pelvic pain or abnormal vaginal bleeding.    Physical Exam:   /78 (Patient Position: Sitting)   Wt 91.3 kg (201 lb 4.5 oz)   LMP 12/01/2020   BMI 34.02 kg/m²   Body mass index is 34.02 kg/m².  Constitutional: She appears alert and responsive. She appears well-developed, well-groomed, and well-nourished. No distress.  Breasts: are symmetrical.  Right breast exhibits no inverted nipple, no mass, no nipple discharge, no skin change and no tenderness.  Left breast exhibits no inverted nipple, no mass, no nipple discharge, no skin change and no tenderness.  Abdominal: Soft. She exhibits no distension, hernias or masses. There is no tenderness. No enlargement of liver edge or spleen.  There is no rebound and no guarding.   Genitourinary:    External rectal exam shows no thrombosed external hemorrhoids, no lesions.     Pelvic exam was performed with patient supine.   No labial fusion, and symmetrical.    There is no rash, lesion or injury on the right labia.   There is no rash, lesion or injury on the left labia.   No bleeding and no signs of injury around the vaginal introitus, urethral meatus is normal size and without prolapse or lesions, urethra well supported.    No vaginal discharge found.    No significant Cystocele, Enterocele or rectocele   Bimanual exam:   The urethra is normal to palpation and there are no palpable vaginal wall masses.   Right adnexum displays no mass or  nodularity and no tenderness.   Left adnexum displays no mass or nodularity and no tenderness.      Assessment/Plan:   Encounter for gynecological examination (general) (routine) without abnormal findings    Screen for STD (sexually transmitted disease)  -     POCT Vaginosis Screen by DNA Probe (Affirm)  -     C. trachomatis/N. gonorrhoeae by AMP DNA [facility]; Vagina  -     Hepatitis B Surface Antigen; Future; Expected date: 02/03/2025  -     Hepatitis C Antibody; Future; Expected date: 02/03/2025  -     HIV 1/2 Ag/Ab (4th Gen); Future; Expected date: 02/03/2025  -     Treponema Pallidium Antibodies IgG, IgM; Future; Expected date: 02/03/2025    Pap smears no longer indicated.       Follow up in 1 year.

## 2025-02-05 NOTE — PROGRESS NOTES
"Subjective:       Patient ID: Caridad De La Garza is a 43 y.o. female.    Chief Complaint: Knee Pain    HPI Patient is 42 yo F who presents for urgent visit with complaints of right knee pain. Says one week ago she placed her knee on sofa and "pivoted and felt heard grind". Reports pain since. Denies swelling and edema. Denies difficulty with ambulation but does have pain when bending down to bath baby. Pain is localized to lateral lower knee region and she describes pain as shooting up to hip. Denies knee giving out and weakness.     Review of Systems   Constitutional: Negative for fatigue.   Musculoskeletal: Positive for arthralgias. Negative for gait problem and joint swelling.   Neurological: Negative for weakness and numbness.       Objective:      Vitals:    03/04/19 1536   BP: (!) 146/84   Pulse: 80     Physical Exam   Cardiovascular: Normal rate, regular rhythm and normal heart sounds.   Pulmonary/Chest: Effort normal and breath sounds normal.   Musculoskeletal: Normal range of motion. She exhibits tenderness (point tenderness lateral patellar tendon).   Neg anterior drawer test   Neurological: She exhibits normal muscle tone. Coordination normal.       Assessment:       1. Patellar tendon strain, right, initial encounter    2. Elevated blood pressure reading        Plan:       Patellar tendon strain, right, initial encounter  -     naproxen (NAPROSYN) 500 MG tablet; Take 1 tablet (500 mg total) by mouth 2 (two) times daily with meals.  Dispense: 30 tablet; Refill: 1    Elevated blood pressure reading         -    Patient reports she rushed to make appt. Likely reason for acutely elevated BP         -    Recheck at next appt    Follow-up if symptoms worsen or fail to improve.     Tami Scott MD  3/4/2019  [facility] HO-3        " palpitations

## 2025-02-06 ENCOUNTER — TELEPHONE (OUTPATIENT)
Dept: OBSTETRICS AND GYNECOLOGY | Facility: CLINIC | Age: 50
End: 2025-02-06

## 2025-02-06 DIAGNOSIS — B96.89 BV (BACTERIAL VAGINOSIS): Primary | ICD-10-CM

## 2025-02-06 DIAGNOSIS — N76.0 BV (BACTERIAL VAGINOSIS): Primary | ICD-10-CM

## 2025-02-06 LAB
BACTERIAL VAGINOSIS DNA: DETECTED
C TRACH DNA SPEC QL NAA+PROBE: NOT DETECTED
CANDIDA GLABRATA/KRUSEI: NOT DETECTED
CANDIDA RRNA VAG QL PROBE: NOT DETECTED
N GONORRHOEA DNA SPEC QL NAA+PROBE: NOT DETECTED
TRICHOMONAS VAGINALIS: NOT DETECTED

## 2025-02-06 RX ORDER — METRONIDAZOLE 500 MG/1
500 TABLET ORAL EVERY 12 HOURS
Qty: 14 TABLET | Refills: 0 | Status: SHIPPED | OUTPATIENT
Start: 2025-02-06 | End: 2025-02-13

## 2025-02-06 NOTE — TELEPHONE ENCOUNTER
Called pt to let her know that  sent RX to pharmacy on file. Pt understood and had no further complaints.

## 2025-02-06 NOTE — TELEPHONE ENCOUNTER
Called pt about results and to see if pt having any symptoms so RX can be sent to pharmacy. Pt states she's having symptoms and would like RX to be sent to [facility] on [address] in [place]. Pt understood and didn't have any further complaints

## 2025-02-06 NOTE — TELEPHONE ENCOUNTER
----- Message from Mesha Mayorga MD sent at 2/6/2025 11:23 AM CST -----  Your results showed a bacterial infection. A prescription has been sent to your pharmacy.

## 2025-02-18 ENCOUNTER — RESULTS FOLLOW-UP (OUTPATIENT)
Dept: OBSTETRICS AND GYNECOLOGY | Facility: CLINIC | Age: 50
End: 2025-02-18

## 2025-03-10 ENCOUNTER — PATIENT MESSAGE (OUTPATIENT)
Dept: OBSTETRICS AND GYNECOLOGY | Facility: CLINIC | Age: 50
End: 2025-03-10
Payer: COMMERCIAL

## 2025-03-20 ENCOUNTER — OFFICE VISIT (OUTPATIENT)
Dept: OBSTETRICS AND GYNECOLOGY | Facility: CLINIC | Age: 50
End: 2025-03-20
Payer: COMMERCIAL

## 2025-03-20 VITALS
WEIGHT: 199.06 LBS | DIASTOLIC BLOOD PRESSURE: 72 MMHG | SYSTOLIC BLOOD PRESSURE: 120 MMHG | BODY MASS INDEX: 33.16 KG/M2 | HEIGHT: 65 IN

## 2025-03-20 DIAGNOSIS — L29.2 VULVAR ITCHING: Primary | ICD-10-CM

## 2025-03-20 PROCEDURE — 3074F SYST BP LT 130 MM HG: CPT | Mod: CPTII,S$GLB,, | Performed by: OBSTETRICS & GYNECOLOGY

## 2025-03-20 PROCEDURE — 1159F MED LIST DOCD IN RCRD: CPT | Mod: CPTII,S$GLB,, | Performed by: OBSTETRICS & GYNECOLOGY

## 2025-03-20 PROCEDURE — 3008F BODY MASS INDEX DOCD: CPT | Mod: CPTII,S$GLB,, | Performed by: OBSTETRICS & GYNECOLOGY

## 2025-03-20 PROCEDURE — 87491 CHLMYD TRACH DNA AMP PROBE: CPT | Performed by: OBSTETRICS & GYNECOLOGY

## 2025-03-20 PROCEDURE — 99213 OFFICE O/P EST LOW 20 MIN: CPT | Mod: S$GLB,,, | Performed by: OBSTETRICS & GYNECOLOGY

## 2025-03-20 PROCEDURE — 99999 PR PBB SHADOW E&M-EST. PATIENT-LVL III: CPT | Mod: PBBFAC,,, | Performed by: OBSTETRICS & GYNECOLOGY

## 2025-03-20 PROCEDURE — 81515 NFCT DS BV&VAGINITIS DNA ALG: CPT | Performed by: OBSTETRICS & GYNECOLOGY

## 2025-03-20 PROCEDURE — 3078F DIAST BP <80 MM HG: CPT | Mod: CPTII,S$GLB,, | Performed by: OBSTETRICS & GYNECOLOGY

## 2025-03-20 NOTE — PROGRESS NOTES
Chief Complaint   Patient presents with    Vulvar Itch     Would like to recheck after BV       Caridad De La Garza is a 49 y.o. female  presents with complaint of vaginal itching    Recently treated for bacterial vaginosis. Then thinks she developed a yeast infection.  She then used Monistat ovules for 7 days.  However, she states the symptoms did not improve.  She denies diabetes.  Denies history of STDs.  Denies recurrent infections.  Denies douching.  Admits to sex without condoms.    Trichomonas vaginalis   Date Value Ref Range Status   2025 Not Detected Not Detected Final   2015 Negative Negative Final     Candida sp   Date Value Ref Range Status   2025 Not Detected Negative Final     Comment:     Candida species group includes: Candida albicans, Candida tropicalis,  Candida parapsiolosis, Imani dubliniensis     2015 Negative Negative Final     Bacterial vaginosis DNA   Date Value Ref Range Status   2025 Detected (A) Not Detected Final     Chlamydia, Amplified DNA   Date Value Ref Range Status   2025 Not Detected Not Detected Final   2015 Negative  Final     N gonorrhoeae, amplified DNA   Date Value Ref Range Status   2025 Not Detected Not Detected Final   2015 Negative  Final       Past Medical History:   Diagnosis Date    Abnormal Pap smear of cervix     rechecked and came back normal     Anemia     Endometriosis     GERD (gastroesophageal reflux disease)     Headache     Hypertension in pregnancy, preeclampsia, delivered     controled with diet    PONV (postoperative nausea and vomiting)      Past Surgical History:   Procedure Laterality Date    CYSTOSCOPY N/A 10/30/2020    Procedure: CYSTOSCOPY;  Surgeon: Candace Sims MD;  Location: [facility];  Service: OB/GYN;  Laterality: N/A;    HYSTERECTOMY N/A 10/30/2020    total    LAPAROSCOPIC TOTAL HYSTERECTOMY N/A 10/30/2020    Procedure: HYSTERECTOMY, TOTAL, LAPAROSCOPIC;  Surgeon: Candace Sims MD;   "Location: [facility] OR;  Service: OB/GYN;  Laterality: N/A;    PELVIC LAPAROSCOPY      [facility] in [place]- endometriosis    salpingectomy and endometrial ablation  2017     Social History[1]  Family History   Problem Relation Name Age of Onset    Hypertension Father      Allergies Mother      Allergic rhinitis Mother      Allergies Sister      Allergic rhinitis Sister      Breast cancer Maternal Cousin      Breast cancer Maternal Cousin      Ovarian cancer Neg Hx      Cervical cancer Neg Hx      Endometrial cancer Neg Hx      Vaginal cancer Neg Hx      Angioedema Neg Hx      Asthma Neg Hx      Atopy Neg Hx      Eczema Neg Hx      Immunodeficiency Neg Hx      Rhinitis Neg Hx      Urticaria Neg Hx       OB History    Para Term  AB Living   4 2 2 0 2 2   SAB IAB Ectopic Multiple Live Births    2   2      # Outcome Date GA Lbr Thee/2nd Weight Sex Type Anes PTL Lv   4 IAB            3 IAB            2 Term      Vag-Spont   RITO   1 Term      Vag-Spont   RITO      Obstetric Comments   8-20-15 episiotomy performed        /72   Ht 5' 4.5" (1.638 m)   Wt 90.3 kg (199 lb 1.2 oz)   LMP 2020   BMI 33.64 kg/m²     Review of Systems  Constitutional: negative for chills and fatigue  Gastrointestinal: negative for abdominal pain, constipation, diarrhea, nausea and vomiting  Genitourinary:negative for abnormal menstrual periods, genital lesions and vaginal discharge, dysuria, frequency and hematuria    PHYSICAL EXAM:  VULVA: normal appearing vulva with no masses, tenderness or lesions   VAGINA: normal appearing vagina with normal color and no lesions  Discharge noted  CERVIX: normal appearing cervix without discharge or lesions   UTERUS: uterus is normal size, shape, consistency and nontender   ADNEXA: normal adnexa in size, nontender and no masses    ASSESSMENT and PLAN:    ICD-10-CM ICD-9-CM    1. BV (bacterial vaginosis)  N76.0 616.10 Vaginosis Screen by DNA Probe    B96.89 041.9 C. trachomatis/N. " gonorrhoeae by AMP DNA [facility]; Cervicovaginal            FOLLOW UP: PRN lack of improvement.         [1]   Social History  Tobacco Use    Smoking status: Former     Current packs/day: 0.00     Average packs/day: 0.5 packs/day for 27.5 years (13.7 ttl pk-yrs)     Types: Cigarettes     Start date: 1993     Quit date: 2020     Years since quittin.7    Smokeless tobacco: Never   Substance Use Topics    Alcohol use: Yes     Alcohol/week: 0.0 standard drinks of alcohol     Comment: occasional    Drug use: No

## 2025-03-24 ENCOUNTER — PATIENT MESSAGE (OUTPATIENT)
Dept: OBSTETRICS AND GYNECOLOGY | Facility: CLINIC | Age: 50
End: 2025-03-24
Payer: COMMERCIAL

## 2025-03-25 ENCOUNTER — TELEPHONE (OUTPATIENT)
Dept: OBSTETRICS AND GYNECOLOGY | Facility: CLINIC | Age: 50
End: 2025-03-25
Payer: COMMERCIAL

## 2025-03-25 ENCOUNTER — RESULTS FOLLOW-UP (OUTPATIENT)
Dept: OBSTETRICS AND GYNECOLOGY | Facility: CLINIC | Age: 50
End: 2025-03-25
Payer: COMMERCIAL

## 2025-03-25 DIAGNOSIS — B96.89 BV (BACTERIAL VAGINOSIS): Primary | ICD-10-CM

## 2025-03-25 DIAGNOSIS — N76.0 BV (BACTERIAL VAGINOSIS): Primary | ICD-10-CM

## 2025-03-25 RX ORDER — CLINDAMYCIN PHOSPHATE 20 MG/G
CREAM VAGINAL NIGHTLY
Qty: 40 G | Refills: 0 | Status: SHIPPED | OUTPATIENT
Start: 2025-03-25 | End: 2025-04-01

## 2025-03-25 NOTE — TELEPHONE ENCOUNTER
----- Message from Mesha Mayorga MD sent at 3/25/2025  8:08 AM CDT -----  Your results showed a bacterial infection. A prescription has been sent to your pharmacy.     ----- Message -----  From: Ilir, [employer]  Sent: 3/22/2025  12:18 AM CDT  To: Mesha Mayorga MD

## 2025-04-24 ENCOUNTER — OFFICE VISIT (OUTPATIENT)
Dept: OBSTETRICS AND GYNECOLOGY | Facility: CLINIC | Age: 50
End: 2025-04-24
Payer: COMMERCIAL

## 2025-04-24 VITALS — BODY MASS INDEX: 33.9 KG/M2 | DIASTOLIC BLOOD PRESSURE: 74 MMHG | WEIGHT: 200.63 LBS | SYSTOLIC BLOOD PRESSURE: 126 MMHG

## 2025-04-24 DIAGNOSIS — B37.31 YEAST VAGINITIS: ICD-10-CM

## 2025-04-24 DIAGNOSIS — N89.8 VAGINAL DISCHARGE: Primary | ICD-10-CM

## 2025-04-24 PROCEDURE — 99213 OFFICE O/P EST LOW 20 MIN: CPT | Mod: S$GLB,,, | Performed by: OBSTETRICS & GYNECOLOGY

## 2025-04-24 PROCEDURE — 1159F MED LIST DOCD IN RCRD: CPT | Mod: CPTII,S$GLB,, | Performed by: OBSTETRICS & GYNECOLOGY

## 2025-04-24 PROCEDURE — 99999 PR PBB SHADOW E&M-EST. PATIENT-LVL III: CPT | Mod: PBBFAC,,, | Performed by: OBSTETRICS & GYNECOLOGY

## 2025-04-24 PROCEDURE — 3074F SYST BP LT 130 MM HG: CPT | Mod: CPTII,S$GLB,, | Performed by: OBSTETRICS & GYNECOLOGY

## 2025-04-24 PROCEDURE — 3008F BODY MASS INDEX DOCD: CPT | Mod: CPTII,S$GLB,, | Performed by: OBSTETRICS & GYNECOLOGY

## 2025-04-24 PROCEDURE — 3078F DIAST BP <80 MM HG: CPT | Mod: CPTII,S$GLB,, | Performed by: OBSTETRICS & GYNECOLOGY

## 2025-04-24 RX ORDER — FLUCONAZOLE 150 MG/1
TABLET ORAL
Qty: 3 TABLET | Refills: 0 | Status: SHIPPED | OUTPATIENT
Start: 2025-04-24

## 2025-04-24 NOTE — PROGRESS NOTES
Chief Complaint   Patient presents with    Vaginal Discharge    Vaginal Pain    Vaginal Itching       Caridad De La Garza is a 49 y.o. female  presents with complaint of vaginal itching    She did a mud run for easter, admits to being in wet cloths for an extended period of time.     Complains of itching, discharge, & irritation, denies odor.     She denies diabetes.    Denies history of STDs.    Denies recurrent infections.    Denies douching.    Admits to sex without condoms.    Trichomonas vaginalis   Date Value Ref Range Status   2025 Not Detected Not Detected Final   2025 Not Detected Not Detected Final   2015 Negative Negative Final     Candida sp   Date Value Ref Range Status   2025 Not Detected Negative Final     Comment:     Candida species group includes: Candida albicans, Candida tropicalis,  Candida parapsiolosis, Imani dubliniensis     2025 Not Detected Negative Final     Comment:     Candida species group includes: Candida albicans, Candida tropicalis,  Candida parapsiolosis, Imani dubliniensis     2015 Negative Negative Final     Bacterial vaginosis DNA   Date Value Ref Range Status   2025 Detected (A) Not Detected Final   2025 Detected (A) Not Detected Final     Chlamydia, Amplified DNA   Date Value Ref Range Status   2025 Not Detected Not Detected Final   2025 Not Detected Not Detected Final   2015 Negative  Final     N gonorrhoeae, amplified DNA   Date Value Ref Range Status   2025 Not Detected Not Detected Final   2025 Not Detected Not Detected Final   2015 Negative  Final       Past Medical History:   Diagnosis Date    Abnormal Pap smear of cervix     rechecked and came back normal     Anemia     Endometriosis     GERD (gastroesophageal reflux disease)     Headache     Hypertension in pregnancy, preeclampsia, delivered     controled with diet    PONV (postoperative nausea and vomiting)      Past  Surgical History:   Procedure Laterality Date    CYSTOSCOPY N/A 10/30/2020    Procedure: CYSTOSCOPY;  Surgeon: Candace Sims MD;  Location: [facility] OR;  Service: OB/GYN;  Laterality: N/A;    HYSTERECTOMY N/A 10/30/2020    total    LAPAROSCOPIC TOTAL HYSTERECTOMY N/A 10/30/2020    Procedure: HYSTERECTOMY, TOTAL, LAPAROSCOPIC;  Surgeon: Candace Sims MD;  Location: [facility] OR;  Service: OB/GYN;  Laterality: N/A;    PELVIC LAPAROSCOPY      [facility] [place]- endometriosis    salpingectomy and endometrial ablation  2017     Social History[1]  Family History   Problem Relation Name Age of Onset    Hypertension Father      Allergies Mother      Allergic rhinitis Mother      Allergies Sister      Allergic rhinitis Sister      Breast cancer Maternal Cousin      Breast cancer Maternal Cousin      Ovarian cancer Neg Hx      Cervical cancer Neg Hx      Endometrial cancer Neg Hx      Vaginal cancer Neg Hx      Angioedema Neg Hx      Asthma Neg Hx      Atopy Neg Hx      Eczema Neg Hx      Immunodeficiency Neg Hx      Rhinitis Neg Hx      Urticaria Neg Hx       OB History    Para Term  AB Living   4 2 2 0 2 2   SAB IAB Ectopic Multiple Live Births    2   2      # Outcome Date GA Lbr Thee/2nd Weight Sex Type Anes PTL Lv   4 IAB            3 IAB            2 Term      Vag-Spont   RITO   1 Term      Vag-Spont   RITO      Obstetric Comments   8-20-15 episiotomy performed        /74 (Patient Position: Sitting)   Wt 91 kg (200 lb 9.9 oz)   LMP 2020   BMI 33.90 kg/m²     Review of Systems  Constitutional: negative for chills and fatigue  Gastrointestinal: negative for abdominal pain, constipation, diarrhea, nausea and vomiting  Genitourinary:negative for abnormal menstrual periods, genital lesions and vaginal discharge, dysuria, frequency and hematuria    PHYSICAL EXAM:  VULVA: normal appearing vulva with no masses, tenderness or lesions   VAGINA: normal appearing vagina with normal color and no lesions   Discharge noted  CERVIX: normal appearing cervix without discharge or lesions   UTERUS: uterus is normal size, shape, consistency and nontender   ADNEXA: normal adnexa in size, nontender and no masses    ASSESSMENT and PLAN:    ICD-10-CM ICD-9-CM    1. Vaginal discharge  N89.8 623.5 Vaginosis Screen by DNA Probe      C. trachomatis/N. gonorrhoeae by AMP DNA [facility]; Cervicovaginal      2. Yeast vaginitis  B37.31 112.1 fluconazole (DIFLUCAN) 150 MG Tab          FOLLOW UP: PRN lack of improvement.    I spent a total of 20 minutes on the day of the visit.This includes face to face time and non-face to face time preparing to see the patient (eg, review of tests), obtaining and/or reviewing separately obtained history, documenting clinical information in the electronic or other health record, independently interpreting results and communicating results to the patient/family/caregiver, or care coordinator.            [1]   Social History  Tobacco Use    Smoking status: Former     Current packs/day: 0.00     Average packs/day: 0.5 packs/day for 27.5 years (13.7 ttl pk-yrs)     Types: Cigarettes     Start date: 1993     Quit date: 2020     Years since quittin.8    Smokeless tobacco: Never   Substance Use Topics    Alcohol use: Yes     Alcohol/week: 0.0 standard drinks of alcohol     Comment: occasional    Drug use: No

## 2025-04-28 ENCOUNTER — RESULTS FOLLOW-UP (OUTPATIENT)
Dept: OBSTETRICS AND GYNECOLOGY | Facility: CLINIC | Age: 50
End: 2025-04-28

## (undated) DEVICE — BAG TISSUE RETRIEVAL 225ML

## (undated) DEVICE — GLOVE SURGICAL LATEX SZ 6.5

## (undated) DEVICE — TROCAR ENDOPATH XCEL 5MM 7.5CM

## (undated) DEVICE — TRAY FOLEY 16FR INFECTION CONT

## (undated) DEVICE — COVER OVERHEAD SURG LT BLUE

## (undated) DEVICE — SYR 10CC LUER LOCK

## (undated) DEVICE — SYR ONLY LUER LOCK 20CC

## (undated) DEVICE — Device

## (undated) DEVICE — IRRIGATOR ENDOSCOPY DISP.

## (undated) DEVICE — SEE MEDLINE ITEM 157117

## (undated) DEVICE — ADHESIVE DERMABOND ADVANCED

## (undated) DEVICE — NDL INSUF ULTRA VERESS 120MM

## (undated) DEVICE — PAD PREP 50/CA

## (undated) DEVICE — SET CYSTO IRRIGATION UNIV SPIK

## (undated) DEVICE — ELECTRODE REM PLYHSV RETURN 9

## (undated) DEVICE — UNDERGLOVES BIOGEL PI SZ 7 LF

## (undated) DEVICE — SEE MEDLINE ITEM 156955

## (undated) DEVICE — CLOSURE SKIN STERI STRIP 1/2X4

## (undated) DEVICE — BLADE SURG CARBON STEEL SZ11

## (undated) DEVICE — KIT ANTIFOG

## (undated) DEVICE — SEE MEDLINE ITEM 154981

## (undated) DEVICE — DRAPE LAVH LAPAROSCOPY W/FLUID

## (undated) DEVICE — MANIFOLD 4 PORT

## (undated) DEVICE — SUT MONOCYRL 4-0 PS2 UND

## (undated) DEVICE — SEE MEDLINE ITEM 157131

## (undated) DEVICE — PACK LAPAROSCOPY TIBURON

## (undated) DEVICE — SEALER LIGASURE LAP 37CM 5MM

## (undated) DEVICE — SEE MEDLINE ITEM 146355

## (undated) DEVICE — TUBING INSUFFLATION 10

## (undated) DEVICE — SEE MEDLINE ITEM 146292

## (undated) DEVICE — PANTIES FEMININE NAPKIN LG/XLG

## (undated) DEVICE — SEE MEDLINE ITEM 157181

## (undated) DEVICE — SEE MEDLINE ITEM 157116

## (undated) DEVICE — APPLICATOR CHLORAPREP ORN 26ML

## (undated) DEVICE — SYR 50CC LL

## (undated) DEVICE — DRESSING TELFA N ADH 3X8

## (undated) DEVICE — SEE MEDLINE ITEM 152622